# Patient Record
Sex: FEMALE | Race: BLACK OR AFRICAN AMERICAN | NOT HISPANIC OR LATINO
[De-identification: names, ages, dates, MRNs, and addresses within clinical notes are randomized per-mention and may not be internally consistent; named-entity substitution may affect disease eponyms.]

---

## 2017-02-01 ENCOUNTER — FORM ENCOUNTER (OUTPATIENT)
Age: 72
End: 2017-02-01

## 2017-02-02 ENCOUNTER — APPOINTMENT (OUTPATIENT)
Dept: RHEUMATOLOGY | Facility: CLINIC | Age: 72
End: 2017-02-02

## 2017-02-02 ENCOUNTER — LABORATORY RESULT (OUTPATIENT)
Age: 72
End: 2017-02-02

## 2017-02-02 VITALS
OXYGEN SATURATION: 98 % | HEIGHT: 62 IN | DIASTOLIC BLOOD PRESSURE: 83 MMHG | BODY MASS INDEX: 43.24 KG/M2 | SYSTOLIC BLOOD PRESSURE: 125 MMHG | TEMPERATURE: 97.6 F | WEIGHT: 235 LBS | HEART RATE: 80 BPM

## 2017-02-02 DIAGNOSIS — M75.112 INCOMPLETE ROTATOR CUFF TEAR OR RUPTURE OF LEFT SHOULDER, NOT SPECIFIED AS TRAUMATIC: ICD-10-CM

## 2017-02-03 LAB
25(OH)D3 SERPL-MCNC: 25.3 NG/ML
ALBUMIN SERPL ELPH-MCNC: 4.2 G/DL
ALP BLD-CCNC: 110 U/L
ALT SERPL-CCNC: 11 U/L
ANION GAP SERPL CALC-SCNC: 17 MMOL/L
AST SERPL-CCNC: 18 U/L
BASOPHILS # BLD AUTO: 0.04 K/UL
BASOPHILS NFR BLD AUTO: 0.7 %
BILIRUB SERPL-MCNC: 0.2 MG/DL
BUN SERPL-MCNC: 13 MG/DL
CALCIUM SERPL-MCNC: 9 MG/DL
CHLORIDE SERPL-SCNC: 95 MMOL/L
CO2 SERPL-SCNC: 24 MMOL/L
CREAT SERPL-MCNC: 0.82 MG/DL
CRP SERPL-MCNC: 1.02 MG/DL
EOSINOPHIL # BLD AUTO: 0.06 K/UL
EOSINOPHIL NFR BLD AUTO: 1 %
ERYTHROCYTE [SEDIMENTATION RATE] IN BLOOD BY WESTERGREN METHOD: 67 MM/HR
GLUCOSE SERPL-MCNC: 98 MG/DL
HCT VFR BLD CALC: 36.4 %
HGB BLD-MCNC: 11.8 G/DL
IMM GRANULOCYTES NFR BLD AUTO: 0.5 %
LYMPHOCYTES # BLD AUTO: 2 K/UL
LYMPHOCYTES NFR BLD AUTO: 33.8 %
MAN DIFF?: NORMAL
MCHC RBC-ENTMCNC: 22.8 PG
MCHC RBC-ENTMCNC: 32.4 GM/DL
MCV RBC AUTO: 70.4 FL
MONOCYTES # BLD AUTO: 0.64 K/UL
MONOCYTES NFR BLD AUTO: 10.8 %
NEUTROPHILS # BLD AUTO: 3.15 K/UL
NEUTROPHILS NFR BLD AUTO: 53.2 %
PLATELET # BLD AUTO: 368 K/UL
POTASSIUM SERPL-SCNC: 4.1 MMOL/L
PROT SERPL-MCNC: 7.2 G/DL
RBC # BLD: 5.17 M/UL
RBC # FLD: 15.3 %
SODIUM SERPL-SCNC: 136 MMOL/L
WBC # FLD AUTO: 5.92 K/UL

## 2017-03-13 ENCOUNTER — RX RENEWAL (OUTPATIENT)
Age: 72
End: 2017-03-13

## 2017-03-21 ENCOUNTER — APPOINTMENT (OUTPATIENT)
Dept: RHEUMATOLOGY | Facility: CLINIC | Age: 72
End: 2017-03-21

## 2017-03-21 VITALS
OXYGEN SATURATION: 98 % | WEIGHT: 247 LBS | BODY MASS INDEX: 45.45 KG/M2 | HEIGHT: 62 IN | SYSTOLIC BLOOD PRESSURE: 145 MMHG | HEART RATE: 73 BPM | DIASTOLIC BLOOD PRESSURE: 89 MMHG

## 2017-05-01 ENCOUNTER — OTHER (OUTPATIENT)
Age: 72
End: 2017-05-01

## 2017-05-18 ENCOUNTER — APPOINTMENT (OUTPATIENT)
Dept: ORTHOPEDIC SURGERY | Facility: CLINIC | Age: 72
End: 2017-05-18

## 2017-05-18 DIAGNOSIS — Q66.6 OTHER CONGENITAL VALGUS DEFORMITIES OF FEET: ICD-10-CM

## 2017-05-18 DIAGNOSIS — M21.41 FLAT FOOT [PES PLANUS] (ACQUIRED), RIGHT FOOT: ICD-10-CM

## 2017-05-22 ENCOUNTER — APPOINTMENT (OUTPATIENT)
Dept: RHEUMATOLOGY | Facility: CLINIC | Age: 72
End: 2017-05-22

## 2017-05-22 VITALS
HEIGHT: 62 IN | DIASTOLIC BLOOD PRESSURE: 84 MMHG | TEMPERATURE: 97.5 F | BODY MASS INDEX: 46.19 KG/M2 | HEART RATE: 78 BPM | WEIGHT: 251 LBS | SYSTOLIC BLOOD PRESSURE: 138 MMHG | OXYGEN SATURATION: 98 %

## 2017-05-25 ENCOUNTER — APPOINTMENT (OUTPATIENT)
Dept: ORTHOPEDIC SURGERY | Facility: CLINIC | Age: 72
End: 2017-05-25

## 2017-05-25 VITALS
BODY MASS INDEX: 46.19 KG/M2 | WEIGHT: 251 LBS | DIASTOLIC BLOOD PRESSURE: 80 MMHG | SYSTOLIC BLOOD PRESSURE: 135 MMHG | HEART RATE: 75 BPM | HEIGHT: 62 IN

## 2017-05-30 ENCOUNTER — APPOINTMENT (OUTPATIENT)
Dept: CT IMAGING | Facility: CLINIC | Age: 72
End: 2017-05-30

## 2017-06-01 ENCOUNTER — APPOINTMENT (OUTPATIENT)
Dept: ORTHOPEDIC SURGERY | Facility: CLINIC | Age: 72
End: 2017-06-01

## 2017-06-09 ENCOUNTER — OUTPATIENT (OUTPATIENT)
Dept: OUTPATIENT SERVICES | Facility: HOSPITAL | Age: 72
LOS: 1 days | End: 2017-06-09
Payer: MEDICARE

## 2017-06-09 ENCOUNTER — APPOINTMENT (OUTPATIENT)
Dept: RADIOLOGY | Facility: IMAGING CENTER | Age: 72
End: 2017-06-09

## 2017-06-09 DIAGNOSIS — Z98.89 OTHER SPECIFIED POSTPROCEDURAL STATES: Chronic | ICD-10-CM

## 2017-06-09 DIAGNOSIS — Z00.8 ENCOUNTER FOR OTHER GENERAL EXAMINATION: ICD-10-CM

## 2017-06-09 DIAGNOSIS — I83.93 ASYMPTOMATIC VARICOSE VEINS OF BILATERAL LOWER EXTREMITIES: Chronic | ICD-10-CM

## 2017-06-09 PROCEDURE — 72110 X-RAY EXAM L-2 SPINE 4/>VWS: CPT

## 2017-06-20 ENCOUNTER — APPOINTMENT (OUTPATIENT)
Dept: ORTHOPEDIC SURGERY | Facility: CLINIC | Age: 72
End: 2017-06-20

## 2017-06-20 VITALS
WEIGHT: 250 LBS | HEIGHT: 62 IN | DIASTOLIC BLOOD PRESSURE: 76 MMHG | BODY MASS INDEX: 46.01 KG/M2 | SYSTOLIC BLOOD PRESSURE: 110 MMHG | HEART RATE: 72 BPM

## 2017-06-20 RX ORDER — WARFARIN 2.5 MG/1
2.5 TABLET ORAL
Qty: 200 | Refills: 0 | Status: DISCONTINUED | COMMUNITY
Start: 2016-11-02

## 2017-07-03 ENCOUNTER — APPOINTMENT (OUTPATIENT)
Dept: CT IMAGING | Facility: CLINIC | Age: 72
End: 2017-07-03

## 2017-07-04 ENCOUNTER — INPATIENT (INPATIENT)
Facility: HOSPITAL | Age: 72
LOS: 0 days | Discharge: HOME | End: 2017-07-05
Attending: HOSPITALIST | Admitting: HOSPITALIST
Payer: MEDICARE

## 2017-07-04 VITALS
HEART RATE: 78 BPM | SYSTOLIC BLOOD PRESSURE: 125 MMHG | OXYGEN SATURATION: 98 % | TEMPERATURE: 99 F | RESPIRATION RATE: 18 BRPM | DIASTOLIC BLOOD PRESSURE: 66 MMHG

## 2017-07-04 DIAGNOSIS — M71.20 SYNOVIAL CYST OF POPLITEAL SPACE [BAKER], UNSPECIFIED KNEE: ICD-10-CM

## 2017-07-04 DIAGNOSIS — M79.606 PAIN IN LEG, UNSPECIFIED: ICD-10-CM

## 2017-07-04 DIAGNOSIS — Z29.9 ENCOUNTER FOR PROPHYLACTIC MEASURES, UNSPECIFIED: ICD-10-CM

## 2017-07-04 DIAGNOSIS — I10 ESSENTIAL (PRIMARY) HYPERTENSION: ICD-10-CM

## 2017-07-04 DIAGNOSIS — Z98.89 OTHER SPECIFIED POSTPROCEDURAL STATES: Chronic | ICD-10-CM

## 2017-07-04 DIAGNOSIS — M35.3 POLYMYALGIA RHEUMATICA: ICD-10-CM

## 2017-07-04 DIAGNOSIS — I82.509 CHRONIC EMBOLISM AND THROMBOSIS OF UNSPECIFIED DEEP VEINS OF UNSPECIFIED LOWER EXTREMITY: ICD-10-CM

## 2017-07-04 DIAGNOSIS — I83.93 ASYMPTOMATIC VARICOSE VEINS OF BILATERAL LOWER EXTREMITIES: Chronic | ICD-10-CM

## 2017-07-04 DIAGNOSIS — M79.604 PAIN IN RIGHT LEG: ICD-10-CM

## 2017-07-04 DIAGNOSIS — J45.20 MILD INTERMITTENT ASTHMA, UNCOMPLICATED: ICD-10-CM

## 2017-07-04 LAB
ALBUMIN SERPL ELPH-MCNC: 4 G/DL — SIGNIFICANT CHANGE UP (ref 3.3–5)
ALP SERPL-CCNC: 86 U/L — SIGNIFICANT CHANGE UP (ref 40–120)
ALT FLD-CCNC: 12 U/L — SIGNIFICANT CHANGE UP (ref 4–33)
ANISOCYTOSIS BLD QL: SLIGHT — SIGNIFICANT CHANGE UP
APTT BLD: 42 SEC — HIGH (ref 27.5–37.4)
AST SERPL-CCNC: 19 U/L — SIGNIFICANT CHANGE UP (ref 4–32)
BASOPHILS # BLD AUTO: 0.05 K/UL — SIGNIFICANT CHANGE UP (ref 0–0.2)
BASOPHILS NFR BLD AUTO: 0.9 % — SIGNIFICANT CHANGE UP (ref 0–2)
BASOPHILS NFR SPEC: 0 % — SIGNIFICANT CHANGE UP (ref 0–2)
BILIRUB SERPL-MCNC: 0.3 MG/DL — SIGNIFICANT CHANGE UP (ref 0.2–1.2)
BUN SERPL-MCNC: 12 MG/DL — SIGNIFICANT CHANGE UP (ref 7–23)
CALCIUM SERPL-MCNC: 9 MG/DL — SIGNIFICANT CHANGE UP (ref 8.4–10.5)
CHLORIDE SERPL-SCNC: 100 MMOL/L — SIGNIFICANT CHANGE UP (ref 98–107)
CO2 SERPL-SCNC: 29 MMOL/L — SIGNIFICANT CHANGE UP (ref 22–31)
CREAT SERPL-MCNC: 0.72 MG/DL — SIGNIFICANT CHANGE UP (ref 0.5–1.3)
EOSINOPHIL # BLD AUTO: 0.12 K/UL — SIGNIFICANT CHANGE UP (ref 0–0.5)
EOSINOPHIL NFR BLD AUTO: 2.2 % — SIGNIFICANT CHANGE UP (ref 0–6)
EOSINOPHIL NFR FLD: 5 % — SIGNIFICANT CHANGE UP (ref 0–6)
GLUCOSE SERPL-MCNC: 81 MG/DL — SIGNIFICANT CHANGE UP (ref 70–99)
HCT VFR BLD CALC: 34 % — LOW (ref 34.5–45)
HGB BLD-MCNC: 10.7 G/DL — LOW (ref 11.5–15.5)
HYPOCHROMIA BLD QL: SLIGHT — SIGNIFICANT CHANGE UP
IMM GRANULOCYTES # BLD AUTO: 0.01 # — SIGNIFICANT CHANGE UP
IMM GRANULOCYTES NFR BLD AUTO: 0.2 % — SIGNIFICANT CHANGE UP (ref 0–1.5)
INR BLD: 2.2 — HIGH (ref 0.88–1.17)
LYMPHOCYTES # BLD AUTO: 1.71 K/UL — SIGNIFICANT CHANGE UP (ref 1–3.3)
LYMPHOCYTES # BLD AUTO: 30.7 % — SIGNIFICANT CHANGE UP (ref 13–44)
LYMPHOCYTES NFR SPEC AUTO: 18 % — SIGNIFICANT CHANGE UP (ref 13–44)
MANUAL SMEAR VERIFICATION: YES — SIGNIFICANT CHANGE UP
MCHC RBC-ENTMCNC: 21.7 PG — LOW (ref 27–34)
MCHC RBC-ENTMCNC: 31.5 % — LOW (ref 32–36)
MCV RBC AUTO: 69 FL — LOW (ref 80–100)
MICROCYTES BLD QL: SIGNIFICANT CHANGE UP
MONOCYTES # BLD AUTO: 0.76 K/UL — SIGNIFICANT CHANGE UP (ref 0–0.9)
MONOCYTES NFR BLD AUTO: 13.6 % — SIGNIFICANT CHANGE UP (ref 2–14)
MONOCYTES NFR BLD: 16 % — HIGH (ref 2–9)
NEUTROPHIL AB SER-ACNC: 61 % — SIGNIFICANT CHANGE UP (ref 43–77)
NEUTROPHILS # BLD AUTO: 2.92 K/UL — SIGNIFICANT CHANGE UP (ref 1.8–7.4)
NEUTROPHILS NFR BLD AUTO: 52.4 % — SIGNIFICANT CHANGE UP (ref 43–77)
NRBC # FLD: 0 — SIGNIFICANT CHANGE UP
OVALOCYTES BLD QL SMEAR: SLIGHT — SIGNIFICANT CHANGE UP
PLATELET # BLD AUTO: 298 K/UL — SIGNIFICANT CHANGE UP (ref 150–400)
PLATELET COUNT - ESTIMATE: NORMAL — SIGNIFICANT CHANGE UP
PMV BLD: 9.1 FL — SIGNIFICANT CHANGE UP (ref 7–13)
POTASSIUM SERPL-MCNC: 3.8 MMOL/L — SIGNIFICANT CHANGE UP (ref 3.5–5.3)
POTASSIUM SERPL-SCNC: 3.8 MMOL/L — SIGNIFICANT CHANGE UP (ref 3.5–5.3)
PROT SERPL-MCNC: 7 G/DL — SIGNIFICANT CHANGE UP (ref 6–8.3)
PROTHROM AB SERPL-ACNC: 25 SEC — HIGH (ref 9.8–13.1)
RBC # BLD: 4.93 M/UL — SIGNIFICANT CHANGE UP (ref 3.8–5.2)
RBC # FLD: 16.7 % — HIGH (ref 10.3–14.5)
REVIEW TO FOLLOW: YES — SIGNIFICANT CHANGE UP
SODIUM SERPL-SCNC: 140 MMOL/L — SIGNIFICANT CHANGE UP (ref 135–145)
WBC # BLD: 5.57 K/UL — SIGNIFICANT CHANGE UP (ref 3.8–10.5)
WBC # FLD AUTO: 5.57 K/UL — SIGNIFICANT CHANGE UP (ref 3.8–10.5)

## 2017-07-04 PROCEDURE — 93970 EXTREMITY STUDY: CPT | Mod: 26

## 2017-07-04 PROCEDURE — 73521 X-RAY EXAM HIPS BI 2 VIEWS: CPT | Mod: 26

## 2017-07-04 PROCEDURE — 73562 X-RAY EXAM OF KNEE 3: CPT | Mod: 26,50

## 2017-07-04 RX ORDER — DOCUSATE SODIUM 100 MG
100 CAPSULE ORAL
Qty: 0 | Refills: 0 | Status: DISCONTINUED | OUTPATIENT
Start: 2017-07-04 | End: 2017-07-05

## 2017-07-04 RX ORDER — AMLODIPINE BESYLATE 2.5 MG/1
5 TABLET ORAL DAILY
Qty: 0 | Refills: 0 | Status: DISCONTINUED | OUTPATIENT
Start: 2017-07-05 | End: 2017-07-05

## 2017-07-04 RX ORDER — LOSARTAN POTASSIUM 100 MG/1
100 TABLET, FILM COATED ORAL DAILY
Qty: 0 | Refills: 0 | Status: DISCONTINUED | OUTPATIENT
Start: 2017-07-05 | End: 2017-07-05

## 2017-07-04 RX ORDER — HYDROCHLOROTHIAZIDE 25 MG
25 TABLET ORAL DAILY
Qty: 0 | Refills: 0 | Status: DISCONTINUED | OUTPATIENT
Start: 2017-07-05 | End: 2017-07-05

## 2017-07-04 RX ORDER — ACETAMINOPHEN 500 MG
650 TABLET ORAL EVERY 6 HOURS
Qty: 0 | Refills: 0 | Status: DISCONTINUED | OUTPATIENT
Start: 2017-07-04 | End: 2017-07-05

## 2017-07-04 RX ORDER — WARFARIN SODIUM 2.5 MG/1
5 TABLET ORAL ONCE
Qty: 0 | Refills: 0 | Status: DISCONTINUED | OUTPATIENT
Start: 2017-07-05 | End: 2017-07-05

## 2017-07-04 RX ORDER — BUDESONIDE AND FORMOTEROL FUMARATE DIHYDRATE 160; 4.5 UG/1; UG/1
2 AEROSOL RESPIRATORY (INHALATION)
Qty: 0 | Refills: 0 | Status: DISCONTINUED | OUTPATIENT
Start: 2017-07-04 | End: 2017-07-05

## 2017-07-04 RX ADMIN — Medication 75 MILLIGRAM(S): at 22:41

## 2017-07-04 RX ADMIN — Medication 50 MILLIGRAM(S): at 16:44

## 2017-07-04 RX ADMIN — BUDESONIDE AND FORMOTEROL FUMARATE DIHYDRATE 2 PUFF(S): 160; 4.5 AEROSOL RESPIRATORY (INHALATION) at 23:19

## 2017-07-04 NOTE — H&P ADULT - PROBLEM SELECTOR PLAN 1
- Patient c/o of bilateral knee pain on standing, located in the back of the knee  - US of the leg showed small b/l baker's cysts   - Pain control with percocet   - May benefit from steroid injection. Consult Ortho   - PT evaluation - Patient c/o of bilateral knee pain on standing, located in the back of the knee  - US of the leg showed small b/l baker's cysts   - Pain control with percocet (bowel regimen to prevent s/e of constipation)  - May benefit from steroid injection. Consult Ortho   - PT evaluation

## 2017-07-04 NOTE — ED PROVIDER NOTE - ATTENDING CONTRIBUTION TO CARE
agree with resident note  72yo female h/o htn, PE on coumadin (protein c/s) PMR, arthritis presents with b/l leg swelling and inability to walk.  Pt is on lyrica and told not to start narcotics per PMD.  Has not been on steroids.  Has long hx of pain issues and swelling with arms and legs but today states much worse because can not walk and having hard time lifting leg.    PE: well appearing; VSS; CTAB/L; s1 s2 no m/r/g abd: soft/NT/ND ext: no edema

## 2017-07-04 NOTE — H&P ADULT - PROBLEM SELECTOR PLAN 4
- c/w home Losartan-HCTZ 100-25 and amlodipine 5mg once daily - No acute issues presently  - c/w home Losartan-HCTZ 100-25 and amlodipine 5mg once daily

## 2017-07-04 NOTE — H&P ADULT - NSHPREVIEWOFSYSTEMS_GEN_ALL_CORE
REVIEW OF SYSTEMS:  CONSTITUTIONAL: No fever, weight loss, or fatigue  EYES: No eye pain, visual disturbances, or discharge  ENMT:  No difficulty hearing, tinnitus, vertigo; No sinus or throat pain  RESPIRATORY: No cough, wheezing, chills or hemoptysis; No shortness of breath  CARDIOVASCULAR: No chest pain, palpitations, dizziness, or leg swelling  GASTROINTESTINAL: No abdominal or epigastric pain. No nausea, vomiting; No diarrhea or constipation.  GENITOURINARY: No dysuria, frequency, or incontinence  NEUROLOGICAL: No headaches, memory loss, loss of strength, numbness, or tremors  SKIN: No itching, burning, rashes, or lesions   MUSCULOSKELETAL: + for bilateral knee pain and lower ext. swelling   PSYCHIATRIC: No depression, anxiety, mood swings, or difficulty sleeping  HEME/LYMPH: No easy bruising, or bleeding gums REVIEW OF SYSTEMS:  CONSTITUTIONAL: No fever, weight loss, or fatigue  EYES: No eye pain, visual disturbances, or discharge  ENMT:  No difficulty hearing, tinnitus, vertigo; No sinus or throat pain  RESPIRATORY: No cough, wheezing, chills; No shortness of breath  CARDIOVASCULAR: No chest pain, palpitations, dizziness, or leg swelling  GASTROINTESTINAL: No abdominal or epigastric pain. No nausea, vomiting; No diarrhea or constipation.  GENITOURINARY: No dysuria, frequency, or incontinence  NEUROLOGICAL: No headaches, memory loss, loss of strength, numbness, or tremors  SKIN: No itching, burning, rashes, or lesions   MUSCULOSKELETAL: + for bilateral knee pain  PSYCHIATRIC: No depression, anxiety, mood swings, or difficulty sleeping  HEME/LYMPH: No easy bruising, or bleeding gums

## 2017-07-04 NOTE — ED PROVIDER NOTE - OBJECTIVE STATEMENT
72yo female h/o htn, PE on coumadin (protein c/s) PMR, arthritis, p/w inability to walk/bear weight on legs 2/2 to knee pain. Legs chronically swollen, too heavy to lift. No weakness, no sensory deficit, no fever, chills. Pain is worse on left leg little in popliteal area

## 2017-07-04 NOTE — PATIENT PROFILE ADULT. - NS TRANSFER PATIENT BELONGINGS
1 pair blue jemoris, blue and white stripe top, 1 pair grey sneakres,/Clothing Clothing/1 HTC phone, 1 pair blue jeans, blue and white stripe top, 1 pair grey willa,

## 2017-07-04 NOTE — ED ADULT TRIAGE NOTE - CHIEF COMPLAINT QUOTE
p/t c/o of pain to ble and weakness since this am and difficulty walking denies any other discomfort nad noted

## 2017-07-04 NOTE — H&P ADULT - HISTORY OF PRESENT ILLNESS
71 y.o. female with PMHx of HTN, PE/DVTs on coumadin, polymyalgia rheumatica, bilateral hip replacement, presenting with inability to walk or bear weight on the legs due to pain in the knees. The patient states her symptoms started around 11am this morning when she tried to get out of bed and has severe pain, /10 in severity in the bilateral knees. 71 y.o. female with PMHx of HTN, PE/DVTs on coumadin and IVC filter, polymyalgia rheumatica, bilateral hip replacement, presenting with inability to walk or bear weight on the legs due to pain in the knees. The patient states her symptoms started around 11am this morning when she tried to get out of bed and has severe pain, 10/10 in severity in the bilateral knees. At baseline the patient is able to ambulate on her own with the assistance of a rolling walker, does use a wheelchair on other instances, but could not today. The pain was located in the back of the knee and had mild numbness in the toes and was acutely worse on standing, and was relived when lying down. The patient denies any urinary/bowel incontinence, saddle anesthesia or loss of sensations in the lower extremities Also denies any new swelling, tenderness or erythema of the legs. Patient denies any sick contacts or recent travel as well. Due to limited mobility and pain the patient came into the ED     In The ED:    Vital Signs Last 24 Hrs  T(C): 36 (04 Jul 2017 20:23), Max: 37.2 (04 Jul 2017 14:07)  T(F): 96.8 (04 Jul 2017 20:23), Max: 99 (04 Jul 2017 14:07)  HR: 68 (04 Jul 2017 20:23) (68 - 78)  BP: 146/89 (04 Jul 2017 20:23) (125/66 - 148/78)  BP(mean): --  RR: 17 (04 Jul 2017 20:23) (17 - 18)  SpO2: 100% (04 Jul 2017 20:23) (98% - 100%)    The patient underwent a Duplex US of the lower exts. which was negative for DVTs but showed small bilateral baker's cysts.

## 2017-07-04 NOTE — H&P ADULT - PMH
Asthma    Deep venous thrombosis  2005, 2014 x 2  HTN (hypertension)    Liver nodule    Pancreatic abscess    PE (pulmonary embolism)  s/p [place] Filter  Polymyalgia rheumatica

## 2017-07-04 NOTE — H&P ADULT - PSH
[place] filter in place    History of appendectomy    S/P hip replacement  b/l hip replacement  S/P hysterectomy    Varicose vein of leg  cauterization left leg vein 1989

## 2017-07-04 NOTE — H&P ADULT - NSHPSOCIALHISTORY_GEN_ALL_CORE
Social History:    Marital Status:  (   )    (   ) Single    (   )    (  )   Occupation:   Lives with: (  ) alone  (  ) children   (  ) spouse   (  ) parents  (  ) other    Substance Use (street drugs): (  ) never used  (  ) other:  Tobacco Usage:  (   ) never smoked   (   ) former smoker   (   ) current smoker  (     ) pack year  (        ) last cigarette date  Alcohol Usage:  Sexual History: Social History:    Marital Status:  (   )    (   ) Single    (   )    ( x )   Occupation: Retired  Lives with: ( x ) alone  (  ) children   (  ) spouse   (  ) parents  (  ) other    Substance Use (street drugs): (x  ) never used  (  ) other:  Tobacco Usage:  (  x ) never smoked   (   ) former smoker   (   ) current smoker  (     ) pack year  (        ) last cigarette date  Alcohol Usage: glass of wine in a week

## 2017-07-04 NOTE — ED ADULT NURSE NOTE - OBJECTIVE STATEMENT
Hx of intermittent weakness, on coumadin for prior PE. Pt AOx3 c/o b/l pain, mild numbness to right foot, inability to lift left leg and posterior pain to left knee. Pt states she normally takes Lyrica for pain and is able to walk, but was unable today. Equal sensation bilaterally. Pt denies fever, chills, n/v, chest pain, SOB. Family at bedside.

## 2017-07-04 NOTE — ED PROVIDER NOTE - PMH
Asthma    Deep venous thrombosis  2005, 2014 x 2  HTN (hypertension)    Liver nodule    Pancreatic abscess    PE (pulmonary embolism)  s/p [place] Filter

## 2017-07-04 NOTE — H&P ADULT - FAMILY HISTORY
Father  Still living? Unknown  Family history of MI (myocardial infarction), Age at diagnosis: Age Unknown

## 2017-07-04 NOTE — H&P ADULT - PROBLEM SELECTOR PLAN 2
- Percocet 5mg/325mg q6h PRN for severe pain   - Tylenol for mild and moderate pain   - c/w Lyrica for PMR pain  - Colace BID

## 2017-07-04 NOTE — H&P ADULT - NSHPLABSRESULTS_GEN_ALL_CORE
Labs:    07-04    140  |  100  |  12  ----------------------------<  81  3.8   |  29  |  0.72    Ca    9.0      04 Jul 2017 16:35    TPro  7.0  /  Alb  4.0  /  TBili  0.3  /  DBili  x   /  AST  19  /  ALT  12  /  AlkPhos  86  07-04      PT/INR - ( 04 Jul 2017 16:35 )   PT: 25.0 SEC;   INR: 2.20          PTT - ( 04 Jul 2017 16:35 )  PTT:42.0 SEC                                        10.7   5.57  )-----------( 298      ( 04 Jul 2017 16:35 )             34.0       Radiology:    US Duplex Venous Lower Ext Complete, Bilateral (07.04.17 @ 17:50)     IMPRESSION:     No evidence of bilateral lower extremity deep venous thrombosis. The   bilateral calf veins were not visualized.  Bilateral small Baker cysts.

## 2017-07-04 NOTE — ED PROVIDER NOTE - PSH
[place] filter in place    History of appendectomy    S/P hip replacement  b/l hip replacement  S/P hysterectomy    Varicose vein of leg  cauterization left leg vein 1989

## 2017-07-04 NOTE — H&P ADULT - NSHPPHYSICALEXAM_GEN_ALL_CORE
Vital Signs Last 24 Hrs  T(C): 36.1 (04 Jul 2017 18:02), Max: 37.2 (04 Jul 2017 14:07)  T(F): 96.9 (04 Jul 2017 18:02), Max: 99 (04 Jul 2017 14:07)  HR: 76 (04 Jul 2017 18:02) (76 - 78)  BP: 148/78 (04 Jul 2017 18:02) (125/66 - 148/78)  BP(mean): --  RR: 17 (04 Jul 2017 18:02) (17 - 18)  SpO2: 99% (04 Jul 2017 18:02) (98% - 99%)    PHYSICAL EXAM:  GENERAL: NAD, well-groomed, well-developed  HEAD:  Atraumatic, Normocephalic  EYES: EOMI, PERRLA, conjunctiva and sclera clear  ENMT: No tonsillar erythema, exudates, or enlargement; Moist mucous membranes, Good dentition, No lesions  NECK: Supple, No JVD, Normal thyroid  CHEST/LUNG: Clear to percussion bilaterally; No rales, rhonchi, wheezing, or rubs  HEART: Regular rate and rhythm; No murmurs, rubs, or gallops  ABDOMEN: Soft, Nontender, Nondistended; Bowel sounds present  EXTREMITIES:  2+ Peripheral Pulses, No clubbing, cyanosis, or edema  LYMPH: No lymphadenopathy noted  SKIN: No rashes or lesions  NERVOUS SYSTEM:  Alert & Oriented X3, Good concentration; Motor Strength 5/5 B/L upper and lower extremities; DTRs 2+ intact and symmetric Vital Signs Last 24 Hrs  T(C): 36.1 (04 Jul 2017 18:02), Max: 37.2 (04 Jul 2017 14:07)  T(F): 96.9 (04 Jul 2017 18:02), Max: 99 (04 Jul 2017 14:07)  HR: 76 (04 Jul 2017 18:02) (76 - 78)  BP: 148/78 (04 Jul 2017 18:02) (125/66 - 148/78)  BP(mean): --  RR: 17 (04 Jul 2017 18:02) (17 - 18)  SpO2: 99% (04 Jul 2017 18:02) (98% - 99%)    PHYSICAL EXAM:  GENERAL: NAD, well-groomed, well-developed  HEAD:  Atraumatic, Normocephalic  EYES: EOMI, PERRLA, conjunctiva and sclera clear  ENMT: No tonsillar erythema, exudates, or enlargement; Moist mucous membranes, Good dentition, No lesions  NECK: Supple, No JVD  CHEST/LUNG: Clear to percussion bilaterally; No rales, rhonchi, wheezing, or rubs  HEART: Regular rate and rhythm; No murmurs, rubs, or gallops  ABDOMEN: Soft, Nontender, Nondistended; Bowel sounds present  EXTREMITIES:  2+ Peripheral Pulses. Tenderness along the popiteal fossa   LYMPH: No lymphadenopathy noted  SKIN: No rashes or lesions  NERVOUS SYSTEM:  Alert & Oriented X3, Good concentration; Motor Strength 5/5 in Upper Ext. 3/5 in the right lower ext, 3/5 in the left lower extremity

## 2017-07-04 NOTE — H&P ADULT - PROBLEM SELECTOR PLAN 5
- Patient was diagnosis last year  - Started on Prednisone but had severe swelling. Stopped after three months   - Lyrica for pain associated with PMR - Patient was diagnosed last year  - Started on Prednisone but had severe swelling; stopped after three months   - Lyrica for pain associated with PMR  - Physical therapy

## 2017-07-04 NOTE — H&P ADULT - PROBLEM SELECTOR PLAN 6
- Pt is on Advair diskus at home   - Symbicort in hospital   - Albuterol PRN - Asymptomatic presently  - Pt is on Advair diskus at home   - Symbicort in hospital   - Albuterol PRN

## 2017-07-04 NOTE — H&P ADULT - ASSESSMENT
71 y.o. female with PMHx of HTN, PE/DVTs on coumadin, polymyalgia rheumatica, bilateral hip replacement admitted for inability to walk due to bilateral pain most likely due to Baker's cyst. 71 y.o. female with PMHx of HTN, PE/DVTs on coumadin and, polymyalgia rheumatica, bilateral hip replacement admitted for inability to walk due to bilateral knee pain most likely due to Baker's cyst.

## 2017-07-04 NOTE — H&P ADULT - PROBLEM SELECTOR PROBLEM 3
Chronic deep vein thrombosis (DVT) of lower extremity, unspecified laterality, unspecified vein Chronic deep vein thrombosis (DVT) of tibial vein of left lower extremity

## 2017-07-04 NOTE — ED ADULT NURSE NOTE - PMH
Asthma    Deep venous thrombosis  2005, 2014 x 2  HTN (hypertension)    Liver nodule    Pancreatic abscess    PE (pulmonary embolism)  s/p [place] Filter

## 2017-07-04 NOTE — ED PROVIDER NOTE - MEDICAL DECISION MAKING DETAILS
70yo female with b/l leg pain unable to ambulate, pain worse in left leg, pain control, r/o DVT, reassess, if cant ambulate will need admission

## 2017-07-05 ENCOUNTER — TRANSCRIPTION ENCOUNTER (OUTPATIENT)
Age: 72
End: 2017-07-05

## 2017-07-05 VITALS
DIASTOLIC BLOOD PRESSURE: 74 MMHG | HEART RATE: 72 BPM | SYSTOLIC BLOOD PRESSURE: 138 MMHG | OXYGEN SATURATION: 99 % | TEMPERATURE: 98 F | RESPIRATION RATE: 18 BRPM

## 2017-07-05 DIAGNOSIS — I82.542 CHRONIC EMBOLISM AND THROMBOSIS OF LEFT TIBIAL VEIN: ICD-10-CM

## 2017-07-05 LAB
24R-OH-CALCIDIOL SERPL-MCNC: 26.3 NG/ML — LOW (ref 30–100)
BUN SERPL-MCNC: 16 MG/DL — SIGNIFICANT CHANGE UP (ref 7–23)
CALCIUM SERPL-MCNC: 9.1 MG/DL — SIGNIFICANT CHANGE UP (ref 8.4–10.5)
CHLORIDE SERPL-SCNC: 98 MMOL/L — SIGNIFICANT CHANGE UP (ref 98–107)
CO2 SERPL-SCNC: 25 MMOL/L — SIGNIFICANT CHANGE UP (ref 22–31)
CREAT SERPL-MCNC: 0.69 MG/DL — SIGNIFICANT CHANGE UP (ref 0.5–1.3)
GLUCOSE SERPL-MCNC: 132 MG/DL — HIGH (ref 70–99)
HCT VFR BLD CALC: 35.2 % — SIGNIFICANT CHANGE UP (ref 34.5–45)
HGB BLD-MCNC: 11.4 G/DL — LOW (ref 11.5–15.5)
INR BLD: 2.33 — HIGH (ref 0.88–1.17)
MAGNESIUM SERPL-MCNC: 2.3 MG/DL — SIGNIFICANT CHANGE UP (ref 1.6–2.6)
MCHC RBC-ENTMCNC: 22.7 PG — LOW (ref 27–34)
MCHC RBC-ENTMCNC: 32.4 % — SIGNIFICANT CHANGE UP (ref 32–36)
MCV RBC AUTO: 70.1 FL — LOW (ref 80–100)
NRBC # FLD: 0 — SIGNIFICANT CHANGE UP
PHOSPHATE SERPL-MCNC: 3.2 MG/DL — SIGNIFICANT CHANGE UP (ref 2.5–4.5)
PLATELET # BLD AUTO: 313 K/UL — SIGNIFICANT CHANGE UP (ref 150–400)
PMV BLD: 10.2 FL — SIGNIFICANT CHANGE UP (ref 7–13)
POTASSIUM SERPL-MCNC: 3.8 MMOL/L — SIGNIFICANT CHANGE UP (ref 3.5–5.3)
POTASSIUM SERPL-SCNC: 3.8 MMOL/L — SIGNIFICANT CHANGE UP (ref 3.5–5.3)
PROTHROM AB SERPL-ACNC: 26.5 SEC — HIGH (ref 9.8–13.1)
RBC # BLD: 5.02 M/UL — SIGNIFICANT CHANGE UP (ref 3.8–5.2)
RBC # FLD: 16.6 % — HIGH (ref 10.3–14.5)
SODIUM SERPL-SCNC: 137 MMOL/L — SIGNIFICANT CHANGE UP (ref 135–145)
TSH SERPL-MCNC: 0.58 UIU/ML — SIGNIFICANT CHANGE UP (ref 0.27–4.2)
WBC # BLD: 5.98 K/UL — SIGNIFICANT CHANGE UP (ref 3.8–10.5)
WBC # FLD AUTO: 5.98 K/UL — SIGNIFICANT CHANGE UP (ref 3.8–10.5)

## 2017-07-05 PROCEDURE — 99223 1ST HOSP IP/OBS HIGH 75: CPT | Mod: GC

## 2017-07-05 RX ORDER — OXYCODONE HYDROCHLORIDE 5 MG/1
1 TABLET ORAL
Qty: 12 | Refills: 0 | OUTPATIENT
Start: 2017-07-05 | End: 2017-07-08

## 2017-07-05 RX ORDER — WARFARIN SODIUM 2.5 MG/1
5 TABLET ORAL ONCE
Qty: 0 | Refills: 0 | Status: COMPLETED | OUTPATIENT
Start: 2017-07-05 | End: 2017-07-05

## 2017-07-05 RX ORDER — HYDROCHLOROTHIAZIDE 25 MG
25 TABLET ORAL DAILY
Qty: 0 | Refills: 0 | Status: DISCONTINUED | OUTPATIENT
Start: 2017-07-05 | End: 2017-07-05

## 2017-07-05 RX ORDER — ACETAMINOPHEN 500 MG
2 TABLET ORAL
Qty: 0 | Refills: 0 | COMMUNITY
Start: 2017-07-05

## 2017-07-05 RX ORDER — LOSARTAN POTASSIUM 100 MG/1
100 TABLET, FILM COATED ORAL DAILY
Qty: 0 | Refills: 0 | Status: DISCONTINUED | OUTPATIENT
Start: 2017-07-05 | End: 2017-07-05

## 2017-07-05 RX ORDER — LOSARTAN POTASSIUM 100 MG/1
1 TABLET, FILM COATED ORAL
Qty: 0 | Refills: 0 | COMMUNITY
Start: 2017-07-05

## 2017-07-05 RX ADMIN — WARFARIN SODIUM 5 MILLIGRAM(S): 2.5 TABLET ORAL at 13:06

## 2017-07-05 RX ADMIN — Medication 75 MILLIGRAM(S): at 07:00

## 2017-07-05 RX ADMIN — Medication 25 MILLIGRAM(S): at 07:00

## 2017-07-05 RX ADMIN — Medication 100 MILLIGRAM(S): at 07:00

## 2017-07-05 RX ADMIN — LOSARTAN POTASSIUM 100 MILLIGRAM(S): 100 TABLET, FILM COATED ORAL at 07:00

## 2017-07-05 RX ADMIN — AMLODIPINE BESYLATE 5 MILLIGRAM(S): 2.5 TABLET ORAL at 07:00

## 2017-07-05 NOTE — DISCHARGE NOTE ADULT - SECONDARY DIAGNOSIS.
Chronic deep vein thrombosis (DVT) of lower extremity, unspecified laterality, unspecified vein HTN (hypertension)

## 2017-07-05 NOTE — PHYSICAL THERAPY INITIAL EVALUATION ADULT - PERTINENT HX OF CURRENT PROBLEM, REHAB EVAL
This is a 70 y/o F admitted for inability to walk due to bilateral knee pain most likely due to Baker's cyst,US of the leg showed small b/l baker's cysts This is a 72 y/o F admitted for inability to walk due to bilateral knee pain most likely due to Baker's cyst; US of the leg showed small b/l baker's cysts

## 2017-07-05 NOTE — DISCHARGE NOTE ADULT - PLAN OF CARE
Control pain and follow-up with orthopedist Continue to take acetaminophen for mild to moderate pain as indicated and percocet for moderate to severe pain. Follow-up with Dr. Dick for knee pain [phone] Continue taking your coumadin. Blood pressure control Continue taking your amlodipine and losartan-hydrochlorothiazide as indicated and follow-up with your primary care doctor. Continue to take acetaminophen for mild to moderate pain as indicated and percocet for moderate to severe pain. Follow-up with orthopedic surgeons Dr. Dick for knee pain and Dr. Winston or Dr. Rodriguez for ankle pain. Appointments can be made by calling [phone].

## 2017-07-05 NOTE — PROGRESS NOTE ADULT - ASSESSMENT
71 y.o. female with PMHx of HTN, PE/DVTs on coumadin and, polymyalgia rheumatica, bilateral hip replacement admitted for inability to walk due to bilateral knee pain most likely due to Baker's cyst.

## 2017-07-05 NOTE — PROGRESS NOTE ADULT - PROBLEM SELECTOR PLAN 6
- Asymptomatic presently  - Pt is on Advair diskus at home   - Symbicort in hospital   - Albuterol PRN

## 2017-07-05 NOTE — DISCHARGE NOTE ADULT - CARE PROVIDERS DIRECT ADDRESSES
[email or web address], [email or web address], [email or web address], [email or web address]

## 2017-07-05 NOTE — DISCHARGE NOTE ADULT - HOSPITAL COURSE
History of Present Illness: 	  71 y.o. female with PMHx of HTN, PE/DVTs on coumadin and IVC filter, polymyalgia rheumatica, bilateral hip replacement, presenting with inability to walk or bear weight on the legs due to pain in the knees. The patient states her symptoms started around 11am this morning when she tried to get out of bed and has severe pain, 10/10 in severity in the bilateral knees. At baseline the patient is able to ambulate on her own with the assistance of a rolling walker, does use a wheelchair on other instances, but could not today. The pain was located in the back of the knee and had mild numbness in the toes and was acutely worse on standing, and was relived when lying down. The patient denies any urinary/bowel incontinence, saddle anesthesia or loss of sensations in the lower extremities Also denies any new swelling, tenderness or erythema of the legs. Patient denies any sick contacts or recent travel as well. Due to limited mobility and pain the patient came into the ED     In The ED:  T 96.8, HR 68, /89, RR 17    Hospital Course:  On admission, the patient underwent a Duplex US of the lower extremities, which was negative for DVTs but showed small bilateral baker's cysts. Patient was started on percocet 1 tab q6h. Ortho was consulted and stated that there was nothing to do for the patient inpatient but that she could be seen in their clinic for her ankle and knee pain 2/2 osteoarthritis. Pt's vitals were stable during admission and pain was well controlled on medication. PT evaluation during hospitalization recommended in home PT on d/c, however, pt refused home PT or PT of any kind and felt comfortable leaving without physical therapy follow-up.

## 2017-07-05 NOTE — PROGRESS NOTE ADULT - SUBJECTIVE AND OBJECTIVE BOX
Patient is a 71y old  Female who presents with a chief complaint of B/L weakness and numbness and pain (04 Jul 2017 22:08)     INTERVAL HPI/OVERNIGHT EVENTS:  No acute events overnight.    MEDICATIONS  (STANDING):  pregabalin 75 milliGRAM(s) Oral two times a day  amLODIPine   Tablet 5 milliGRAM(s) Oral daily  buDESOnide  80 MICROgram(s)/formoterol 4.5 MICROgram(s) Inhaler 2 Puff(s) Inhalation two times a day  docusate sodium 100 milliGRAM(s) Oral two times a day  warfarin 5 milliGRAM(s) Oral once  hydrochlorothiazide 25 milliGRAM(s) Oral daily  losartan 100 milliGRAM(s) Oral daily    MEDICATIONS  (PRN):  oxyCODONE  5 mG/acetaminophen 325 mG 1 Tablet(s) Oral every 6 hours PRN Severe Pain (7 - 10)  acetaminophen   Tablet. 650 milliGRAM(s) Oral every 6 hours PRN Mild and Moderate Pain      Allergies    No Known Allergies    Intolerances      REVIEW OF SYSTEMS:  Constitutional: Denies fever, weight loss, fatigue  Resp: Denies SOB, cough, hemoptysis  CV: Denies chest pain, palpitations, dizziness  GI: Denies abdominal pain, N/V/D/C, melena, hematochezia  : Denies dysuria, increased frequency, hematuria  Neuro: Denies HA, weakness, numbness  Skin: Denies rashes, lesions  Lymph Nodes: Denies enlarged glands  MSK: + knee pain, swelling    Vital Signs Last 24 Hrs  T(C): 36.2 (05 Jul 2017 06:58), Max: 37.2 (04 Jul 2017 14:07)  T(F): 97.2 (05 Jul 2017 06:58), Max: 99 (04 Jul 2017 14:07)  HR: 64 (05 Jul 2017 06:58) (64 - 78)  BP: 133/80 (05 Jul 2017 06:58) (125/66 - 148/78)  BP(mean): --  RR: 18 (05 Jul 2017 06:58) (17 - 18)  SpO2: 98% (05 Jul 2017 06:58) (97% - 100%)    PHYSICAL EXAM:  General: NAD, well-groomed, well-developed  Eyes: EOMI, PERRLA, conjunctiva and sclera clear  ENMT: No tonsillar erythema, exudates, or enlargement; moist mucous membranes  Neck: Supple, no JVD  Nervous System: AAOX3, motor and sensation grossly intact in b/l UE and b/l LE  Psych: Appropriate affect and mood  Chest: Clear to auscultation bilaterally; no rales, rhonchi, or wheezing  Heart: Regular rate and rhythm; no murmurs, rubs, or gallops; no LE edema  Abd: +BS, soft, nontender, nondistended  Ext:  2+ radial and DP pulses    LABS:                        11.4   5.98  )-----------( 313      ( 05 Jul 2017 06:40 )             35.2     05 Jul 2017 06:40    137    |  98     |  16     ----------------------------<  132    3.8     |  25     |  0.69     Ca    9.1        05 Jul 2017 06:40  Phos  3.2       05 Jul 2017 06:40  Mg     2.3       05 Jul 2017 06:40    TPro  7.0    /  Alb  4.0    /  TBili  0.3    /  DBili  x      /  AST  19     /  ALT  12     /  AlkPhos  86     04 Jul 2017 16:35    PT/INR - ( 05 Jul 2017 06:40 )   PT: 26.5 SEC;   INR: 2.33          PTT - ( 04 Jul 2017 16:35 )  PTT:42.0 SEC      Radiology:  < from: US Duplex Venous Lower Ext Complete, Bilateral (07.04.17 @ 17:50) >  Bilateral Baker's cysts, measuring 2 cm in the right popliteal fossa and   6 cm in the left popliteal fossa.    < end of copied text >

## 2017-07-05 NOTE — DISCHARGE NOTE ADULT - MEDICATION SUMMARY - MEDICATIONS TO TAKE
I will START or STAY ON the medications listed below when I get home from the hospital:    acetaminophen 325 mg oral tablet  -- 2 tab(s) by mouth every 6 hours, As needed, Mild and Moderate Pain  -- Indication: For Leg pain    acetaminophen-oxycodone 325 mg-5 mg oral tablet  -- 1 tab(s) by mouth every 6 hours, As Needed -for severe pain MDD:4 tabs  -- Indication: For Leg pain    Coumadin 2.5 mg oral tablet  -- Monday, Thrusday: 3 tablets (7.5mg)  Tues,Wed,Fri,Sat,Sun: 2 tablets (5mg)  -- Indication: For Chronic deep vein thrombosis (DVT) of lower extremity, unspecified laterality, unspecified vein    Lyrica 75 mg oral capsule  -- 1 cap(s) by mouth 2 times a day  -- Indication: For Leg pain    losartan-hydrochlorothiazide 100mg-25mg oral tablet  -- 1 tab(s) by mouth once a day  -- Indication: For HTN    Advair Diskus 100 mcg-50 mcg inhalation powder  -- 1 puff(s) inhaled 2 times a day  -- Indication: For Asthma    Ventolin HFA 90 mcg/inh inhalation aerosol  -- 2 puff(s) inhaled 4 times a day, As Needed  -- Indication: For Asthma    amLODIPine 5 mg oral tablet  -- 1 tab(s) by mouth once a day  -- Indication: For HTN

## 2017-07-05 NOTE — PROGRESS NOTE ADULT - PROBLEM SELECTOR PLAN 5
- Patient was diagnosed last year  - Started on Prednisone but had severe swelling; stopped after three months   - Lyrica for pain associated with PMR  - Physical therapy

## 2017-07-05 NOTE — PROGRESS NOTE ADULT - ATTENDING COMMENTS
Patient seen and examined by me. Case discussed with resident and agree with the resident's findings and plan as documented in the resident's note. Patient wants to go home.   -patient refusing any outpatient physical therapy at this time as she reports she is planning to get "both her knees replaced and ankle in September 2017".  -will dose coumadin now as pt states she takes her "coumadin every morning at 8am".   -see d/c summary in sunrise  time macho moore'ing patient >30min.

## 2017-07-05 NOTE — DISCHARGE NOTE ADULT - MEDICATION SUMMARY - MEDICATIONS TO STOP TAKING
I will STOP taking the medications listed below when I get home from the hospital:    losartan 100 mg oral tablet  -- 1 tab(s) by mouth once a day

## 2017-07-05 NOTE — DISCHARGE NOTE ADULT - CARE PLAN
Principal Discharge DX:	Leg pain  Goal:	Control pain and follow-up with orthopedist  Instructions for follow-up, activity and diet:	Continue to take acetaminophen for mild to moderate pain as indicated and percocet for moderate to severe pain. Follow-up with Dr. Dick for knee pain [phone]  Secondary Diagnosis:	Chronic deep vein thrombosis (DVT) of lower extremity, unspecified laterality, unspecified vein  Secondary Diagnosis:	HTN (hypertension) Principal Discharge DX:	Leg pain  Goal:	Control pain and follow-up with orthopedist  Instructions for follow-up, activity and diet:	Continue to take acetaminophen for mild to moderate pain as indicated and percocet for moderate to severe pain. Follow-up with orthopedic surgeons Dr. Dick for knee pain and Dr. Winston or Dr. Rodriguez for ankle pain. Appointments can be made by calling [phone].  Secondary Diagnosis:	Chronic deep vein thrombosis (DVT) of lower extremity, unspecified laterality, unspecified vein  Goal:	Continue taking your coumadin.  Secondary Diagnosis:	HTN (hypertension)  Goal:	Blood pressure control  Instructions for follow-up, activity and diet:	Continue taking your amlodipine and losartan-hydrochlorothiazide as indicated and follow-up with your primary care doctor. Principal Discharge DX:	Leg pain  Goal:	Control pain and follow-up with orthopedist  Instructions for follow-up, activity and diet:	Continue to take acetaminophen for mild to moderate pain as indicated and percocet for moderate to severe pain. Follow-up with orthopedic surgeons Dr. Dick for knee pain and Dr. Winston or Dr. Rodriguez for ankle pain. Appointments can be made by calling [phone].  Secondary Diagnosis:	Chronic deep vein thrombosis (DVT) of lower extremity, unspecified laterality, unspecified vein  Goal:	Continue taking your coumadin.  Secondary Diagnosis:	HTN (hypertension)  Goal:	Blood pressure control  Instructions for follow-up, activity and diet:	Continue taking your amlodipine and losartan-hydrochlorothiazide as indicated and follow-up with your primary care doctor. Principal Discharge DX:	Leg pain  Goal:	Control pain and follow-up with orthopedist  Instructions for follow-up, activity and diet:	Continue to take acetaminophen for mild to moderate pain as indicated and percocet for moderate to severe pain. Follow-up with orthopedic surgeons Dr. Dick for knee pain and Dr. Winston or Dr. Rodriguez for ankle pain. Appointments can be made by calling [phone].  Secondary Diagnosis:	Chronic deep vein thrombosis (DVT) of lower extremity, unspecified laterality, unspecified vein  Goal:	Continue taking your coumadin.  Secondary Diagnosis:	HTN (hypertension)  Goal:	Blood pressure control  Instructions for follow-up, activity and diet:	Continue taking your amlodipine and losartan-hydrochlorothiazide as indicated and follow-up with your primary care doctor.

## 2017-07-05 NOTE — DIETITIAN INITIAL EVALUATION ADULT. - OTHER INFO
Pt states that her appetite has been good and she has been eating well. Nutrition consult was requested for Pt's BMI >40. Reviewed weight loss strategies with Pt and daughter. Pt has no complaints of GI distress nor of difficulty chewing or swallowing.

## 2017-07-05 NOTE — PROGRESS NOTE ADULT - PROBLEM SELECTOR PLAN 3
- No new acute DVT per US of the lower ext.   - IVC filter still in place   - Pt is on coumadin: Mon, Thurs 7.5mg/Rest of the week 5mg  - Monitor PT/INR daily  - Goal INR of 2-3 for DVT and extensive B/L PE history

## 2017-07-05 NOTE — CONSULT NOTE ADULT - SUBJECTIVE AND OBJECTIVE BOX
____ year old ___ presented to the ____ following _______.    PAST MEDICAL & SURGICAL HISTORY:  Polymyalgia rheumatica  Pancreatic abscess  Liver nodule  Deep venous thrombosis: 2005, 2014 x 2  PE (pulmonary embolism): s/p [place] Filter  Asthma  HTN (hypertension)  Varicose vein of leg: cauterization left leg vein 1989  History of appendectomy  [place] filter in place  S/P hip replacement: b/l hip replacement  S/P hysterectomy    Allergies    No Known Allergies    Vital Signs Last 24 Hrs  T(C): 36.2 (05 Jul 2017 06:58), Max: 37.2 (04 Jul 2017 14:07)  T(F): 97.2 (05 Jul 2017 06:58), Max: 99 (04 Jul 2017 14:07)  HR: 64 (05 Jul 2017 06:58) (64 - 78)  BP: 133/80 (05 Jul 2017 06:58) (125/66 - 148/78)  BP(mean): --  RR: 18 (05 Jul 2017 06:58) (17 - 18)  SpO2: 98% (05 Jul 2017 06:58) (97% - 100%)    XRay: no fractures, dislocations. Severe tricompartmental osteoarthritis  US: bilateral popliteal cysts, 6 cm in Left, 2cm in right    Exam:  Gen: NAD, barely palpable bilateral Baker's Cysts. Positive Babita, TTP over medial joint line. PROM 0-80 degrees, no varus/valgus laxity, TTP over popliteal fossa, significant bilateral ankle deformity  Motor: EHL/FHL grossly intact, able to SLR,  Sensory: SILT DP/SP/S/S/T Nerve Distributions    A/P: 71 year old female with bilateral Baker's Cysts  - Pain Control  - PT/OT, OOB  - WBAT with walker  - No acute orthopedic intervention.  - Follow up with Dr. Dick for knee pain. [phone]  - Follow up with Dr. Winston or Dr. Rodriguez for ankle pain

## 2017-07-05 NOTE — DISCHARGE NOTE ADULT - CARE PROVIDER_API CALL
Giuseppe Winston), Orthopaedic Surgery  [address] [place] [address]  Phone: [phone]  Fax: [phone]    Dionicio Dick), Orthopaedic Surgery  [address] [place] [address]  Phone: [phone]  Fax: [phone]

## 2017-07-10 ENCOUNTER — FORM ENCOUNTER (OUTPATIENT)
Age: 72
End: 2017-07-10

## 2017-07-11 ENCOUNTER — APPOINTMENT (OUTPATIENT)
Dept: RADIOLOGY | Facility: CLINIC | Age: 72
End: 2017-07-11

## 2017-07-11 ENCOUNTER — OUTPATIENT (OUTPATIENT)
Dept: OUTPATIENT SERVICES | Facility: HOSPITAL | Age: 72
LOS: 1 days | End: 2017-07-11
Payer: MEDICARE

## 2017-07-11 ENCOUNTER — APPOINTMENT (OUTPATIENT)
Dept: CT IMAGING | Facility: CLINIC | Age: 72
End: 2017-07-11

## 2017-07-11 DIAGNOSIS — I83.93 ASYMPTOMATIC VARICOSE VEINS OF BILATERAL LOWER EXTREMITIES: Chronic | ICD-10-CM

## 2017-07-11 DIAGNOSIS — M25.571 PAIN IN RIGHT ANKLE AND JOINTS OF RIGHT FOOT: ICD-10-CM

## 2017-07-11 DIAGNOSIS — M21.40 FLAT FOOT [PES PLANUS] (ACQUIRED), UNSPECIFIED FOOT: ICD-10-CM

## 2017-07-11 DIAGNOSIS — M19.079 PRIMARY OSTEOARTHRITIS, UNSPECIFIED ANKLE AND FOOT: ICD-10-CM

## 2017-07-11 DIAGNOSIS — Z98.89 OTHER SPECIFIED POSTPROCEDURAL STATES: Chronic | ICD-10-CM

## 2017-07-11 PROCEDURE — 76376 3D RENDER W/INTRP POSTPROCES: CPT

## 2017-07-11 PROCEDURE — 73610 X-RAY EXAM OF ANKLE: CPT

## 2017-07-11 PROCEDURE — 73700 CT LOWER EXTREMITY W/O DYE: CPT

## 2017-07-17 DIAGNOSIS — M79.89 OTHER SPECIFIED SOFT TISSUE DISORDERS: ICD-10-CM

## 2017-07-17 DIAGNOSIS — M19.071 PRIMARY OSTEOARTHRITIS, RIGHT ANKLE AND FOOT: ICD-10-CM

## 2017-07-20 ENCOUNTER — APPOINTMENT (OUTPATIENT)
Dept: ORTHOPEDIC SURGERY | Facility: CLINIC | Age: 72
End: 2017-07-20

## 2017-07-20 VITALS
BODY MASS INDEX: 46.01 KG/M2 | HEART RATE: 74 BPM | WEIGHT: 250 LBS | HEIGHT: 62 IN | DIASTOLIC BLOOD PRESSURE: 78 MMHG | SYSTOLIC BLOOD PRESSURE: 116 MMHG

## 2017-07-20 DIAGNOSIS — M48.06 SPINAL STENOSIS, LUMBAR REGION: ICD-10-CM

## 2017-07-21 ENCOUNTER — OUTPATIENT (OUTPATIENT)
Dept: OUTPATIENT SERVICES | Facility: HOSPITAL | Age: 72
LOS: 1 days | Discharge: HOME | End: 2017-07-21

## 2017-07-21 DIAGNOSIS — I83.93 ASYMPTOMATIC VARICOSE VEINS OF BILATERAL LOWER EXTREMITIES: Chronic | ICD-10-CM

## 2017-07-21 DIAGNOSIS — D64.9 ANEMIA, UNSPECIFIED: ICD-10-CM

## 2017-07-21 DIAGNOSIS — Z98.89 OTHER SPECIFIED POSTPROCEDURAL STATES: Chronic | ICD-10-CM

## 2017-07-24 ENCOUNTER — APPOINTMENT (OUTPATIENT)
Dept: HEMATOLOGY ONCOLOGY | Facility: CLINIC | Age: 72
End: 2017-07-24

## 2017-07-25 ENCOUNTER — APPOINTMENT (OUTPATIENT)
Dept: ORTHOPEDIC SURGERY | Facility: CLINIC | Age: 72
End: 2017-07-25

## 2017-07-25 VITALS
HEART RATE: 73 BPM | BODY MASS INDEX: 46.01 KG/M2 | DIASTOLIC BLOOD PRESSURE: 79 MMHG | SYSTOLIC BLOOD PRESSURE: 131 MMHG | WEIGHT: 250 LBS | HEIGHT: 62 IN

## 2017-07-26 ENCOUNTER — APPOINTMENT (OUTPATIENT)
Dept: HEMATOLOGY ONCOLOGY | Facility: CLINIC | Age: 72
End: 2017-07-26

## 2017-07-26 ENCOUNTER — RESULT REVIEW (OUTPATIENT)
Age: 72
End: 2017-07-26

## 2017-07-26 VITALS
OXYGEN SATURATION: 98 % | BODY MASS INDEX: 42.9 KG/M2 | DIASTOLIC BLOOD PRESSURE: 88 MMHG | RESPIRATION RATE: 16 BRPM | HEART RATE: 77 BPM | TEMPERATURE: 98.3 F | HEIGHT: 63.39 IN | SYSTOLIC BLOOD PRESSURE: 158 MMHG | WEIGHT: 245.15 LBS

## 2017-07-26 DIAGNOSIS — M19.079 PRIMARY OSTEOARTHRITIS, UNSPECIFIED ANKLE AND FOOT: ICD-10-CM

## 2017-07-26 LAB
HCT VFR BLD CALC: 35.7 % — SIGNIFICANT CHANGE UP (ref 34.5–45)
HGB BLD-MCNC: 11.9 G/DL — SIGNIFICANT CHANGE UP (ref 11.5–15.5)
MCHC RBC-ENTMCNC: 23.3 PG — LOW (ref 27–34)
MCHC RBC-ENTMCNC: 33.4 G/DL — SIGNIFICANT CHANGE UP (ref 32–36)
MCV RBC AUTO: 70 FL — LOW (ref 80–100)
PLATELET # BLD AUTO: 285 K/UL — SIGNIFICANT CHANGE UP (ref 150–400)
RBC # BLD: 5.09 M/UL — SIGNIFICANT CHANGE UP (ref 3.8–5.2)
RBC # FLD: 14.1 % — SIGNIFICANT CHANGE UP (ref 10.3–14.5)
WBC # BLD: 5.8 K/UL — SIGNIFICANT CHANGE UP (ref 3.8–10.5)
WBC # FLD AUTO: 5.8 K/UL — SIGNIFICANT CHANGE UP (ref 3.8–10.5)

## 2017-07-26 RX ORDER — TRAMADOL HYDROCHLORIDE 50 MG/1
50 TABLET, COATED ORAL
Qty: 90 | Refills: 0 | Status: DISCONTINUED | COMMUNITY
Start: 2017-07-20 | End: 2017-07-26

## 2017-07-27 ENCOUNTER — APPOINTMENT (OUTPATIENT)
Dept: ORTHOPEDIC SURGERY | Facility: CLINIC | Age: 72
End: 2017-07-27
Payer: MEDICARE

## 2017-07-27 VITALS
WEIGHT: 250 LBS | DIASTOLIC BLOOD PRESSURE: 77 MMHG | HEART RATE: 76 BPM | SYSTOLIC BLOOD PRESSURE: 129 MMHG | HEIGHT: 62 IN | BODY MASS INDEX: 46.01 KG/M2

## 2017-07-27 DIAGNOSIS — Z78.9 OTHER SPECIFIED HEALTH STATUS: ICD-10-CM

## 2017-07-27 DIAGNOSIS — Z09 ENCOUNTER FOR FOLLOW-UP EXAMINATION AFTER COMPLETED TREATMENT FOR CONDITIONS OTHER THAN MALIGNANT NEOPLASM: ICD-10-CM

## 2017-07-27 DIAGNOSIS — M43.16 SPONDYLOLISTHESIS, LUMBAR REGION: ICD-10-CM

## 2017-07-27 DIAGNOSIS — Z87.39 PERSONAL HISTORY OF OTHER DISEASES OF THE MUSCULOSKELETAL SYSTEM AND CONNECTIVE TISSUE: ICD-10-CM

## 2017-07-27 DIAGNOSIS — Z96.643 PRESENCE OF ARTIFICIAL HIP JOINT, BILATERAL: ICD-10-CM

## 2017-07-27 PROCEDURE — 99215 OFFICE O/P EST HI 40 MIN: CPT

## 2017-07-28 DIAGNOSIS — I82.409 ACUTE EMBOLISM AND THROMBOSIS OF UNSPECIFIED DEEP VEINS OF UNSPECIFIED LOWER EXTREMITY: ICD-10-CM

## 2017-08-07 ENCOUNTER — APPOINTMENT (OUTPATIENT)
Dept: RHEUMATOLOGY | Facility: CLINIC | Age: 72
End: 2017-08-07
Payer: MEDICARE

## 2017-08-07 VITALS
OXYGEN SATURATION: 98 % | TEMPERATURE: 98.3 F | HEART RATE: 75 BPM | SYSTOLIC BLOOD PRESSURE: 153 MMHG | HEIGHT: 62 IN | WEIGHT: 246 LBS | DIASTOLIC BLOOD PRESSURE: 90 MMHG | BODY MASS INDEX: 45.27 KG/M2

## 2017-08-07 PROCEDURE — 99213 OFFICE O/P EST LOW 20 MIN: CPT

## 2017-08-15 ENCOUNTER — APPOINTMENT (OUTPATIENT)
Dept: VASCULAR SURGERY | Facility: CLINIC | Age: 72
End: 2017-08-15
Payer: MEDICARE

## 2017-08-15 VITALS
TEMPERATURE: 98.6 F | SYSTOLIC BLOOD PRESSURE: 170 MMHG | HEART RATE: 73 BPM | RESPIRATION RATE: 16 BRPM | DIASTOLIC BLOOD PRESSURE: 97 MMHG

## 2017-08-15 DIAGNOSIS — Z78.9 OTHER SPECIFIED HEALTH STATUS: ICD-10-CM

## 2017-08-15 PROCEDURE — 99203 OFFICE O/P NEW LOW 30 MIN: CPT

## 2017-08-16 ENCOUNTER — APPOINTMENT (OUTPATIENT)
Dept: HEMATOLOGY ONCOLOGY | Facility: CLINIC | Age: 72
End: 2017-08-16
Payer: MEDICARE

## 2017-08-16 VITALS
SYSTOLIC BLOOD PRESSURE: 158 MMHG | RESPIRATION RATE: 16 BRPM | TEMPERATURE: 98.5 F | OXYGEN SATURATION: 98 % | HEART RATE: 69 BPM | DIASTOLIC BLOOD PRESSURE: 88 MMHG

## 2017-08-16 DIAGNOSIS — M21.40 FLAT FOOT [PES PLANUS] (ACQUIRED), UNSPECIFIED FOOT: ICD-10-CM

## 2017-08-16 PROCEDURE — 99215 OFFICE O/P EST HI 40 MIN: CPT

## 2017-08-17 ENCOUNTER — APPOINTMENT (OUTPATIENT)
Dept: CARDIOLOGY | Facility: CLINIC | Age: 72
End: 2017-08-17
Payer: MEDICARE

## 2017-08-17 ENCOUNTER — NON-APPOINTMENT (OUTPATIENT)
Age: 72
End: 2017-08-17

## 2017-08-17 VITALS
WEIGHT: 245 LBS | HEART RATE: 69 BPM | DIASTOLIC BLOOD PRESSURE: 77 MMHG | OXYGEN SATURATION: 98 % | HEIGHT: 62 IN | BODY MASS INDEX: 45.08 KG/M2 | SYSTOLIC BLOOD PRESSURE: 124 MMHG

## 2017-08-17 DIAGNOSIS — M35.3 POLYMYALGIA RHEUMATICA: ICD-10-CM

## 2017-08-17 PROCEDURE — 93306 TTE W/DOPPLER COMPLETE: CPT

## 2017-08-17 PROCEDURE — 93000 ELECTROCARDIOGRAM COMPLETE: CPT

## 2017-08-17 PROCEDURE — 99205 OFFICE O/P NEW HI 60 MIN: CPT

## 2017-08-21 ENCOUNTER — OUTPATIENT (OUTPATIENT)
Dept: OUTPATIENT SERVICES | Facility: HOSPITAL | Age: 72
LOS: 1 days | End: 2017-08-21
Payer: MEDICARE

## 2017-08-21 VITALS
HEIGHT: 62 IN | SYSTOLIC BLOOD PRESSURE: 138 MMHG | WEIGHT: 244.71 LBS | HEART RATE: 74 BPM | TEMPERATURE: 98 F | DIASTOLIC BLOOD PRESSURE: 76 MMHG | RESPIRATION RATE: 16 BRPM

## 2017-08-21 DIAGNOSIS — Z09 ENCOUNTER FOR FOLLOW-UP EXAMINATION AFTER COMPLETED TREATMENT FOR CONDITIONS OTHER THAN MALIGNANT NEOPLASM: Chronic | ICD-10-CM

## 2017-08-21 DIAGNOSIS — M79.671 PAIN IN RIGHT FOOT: ICD-10-CM

## 2017-08-21 DIAGNOSIS — M19.079 PRIMARY OSTEOARTHRITIS, UNSPECIFIED ANKLE AND FOOT: ICD-10-CM

## 2017-08-21 DIAGNOSIS — D68.59 OTHER PRIMARY THROMBOPHILIA: ICD-10-CM

## 2017-08-21 DIAGNOSIS — Z98.89 OTHER SPECIFIED POSTPROCEDURAL STATES: Chronic | ICD-10-CM

## 2017-08-21 DIAGNOSIS — Z01.818 ENCOUNTER FOR OTHER PREPROCEDURAL EXAMINATION: ICD-10-CM

## 2017-08-21 DIAGNOSIS — I83.93 ASYMPTOMATIC VARICOSE VEINS OF BILATERAL LOWER EXTREMITIES: Chronic | ICD-10-CM

## 2017-08-21 LAB
ANION GAP SERPL CALC-SCNC: 8 MMOL/L — SIGNIFICANT CHANGE UP (ref 5–17)
APTT BLD: 41.3 SEC — HIGH (ref 27.5–37.4)
BUN SERPL-MCNC: 13 MG/DL — SIGNIFICANT CHANGE UP (ref 7–23)
CALCIUM SERPL-MCNC: 8.7 MG/DL — SIGNIFICANT CHANGE UP (ref 8.4–10.5)
CHLORIDE SERPL-SCNC: 101 MMOL/L — SIGNIFICANT CHANGE UP (ref 96–108)
CO2 SERPL-SCNC: 30 MMOL/L — SIGNIFICANT CHANGE UP (ref 22–31)
CREAT SERPL-MCNC: 0.89 MG/DL — SIGNIFICANT CHANGE UP (ref 0.5–1.3)
GLUCOSE SERPL-MCNC: 81 MG/DL — SIGNIFICANT CHANGE UP (ref 70–99)
HCT VFR BLD CALC: 40 % — SIGNIFICANT CHANGE UP (ref 34.5–45)
HGB BLD-MCNC: 12.7 G/DL — SIGNIFICANT CHANGE UP (ref 11.5–15.5)
INR BLD: 2.45 RATIO — HIGH (ref 0.88–1.16)
MCHC RBC-ENTMCNC: 23.5 PG — LOW (ref 27–34)
MCHC RBC-ENTMCNC: 31.8 GM/DL — LOW (ref 32–36)
MCV RBC AUTO: 73.8 FL — LOW (ref 80–100)
PLATELET # BLD AUTO: 348 K/UL — SIGNIFICANT CHANGE UP (ref 150–400)
POTASSIUM SERPL-MCNC: 3.4 MMOL/L — LOW (ref 3.5–5.3)
POTASSIUM SERPL-SCNC: 3.4 MMOL/L — LOW (ref 3.5–5.3)
PROTHROM AB SERPL-ACNC: 27.2 SEC — HIGH (ref 9.8–12.7)
RBC # BLD: 5.42 M/UL — HIGH (ref 3.8–5.2)
RBC # FLD: 13.8 % — SIGNIFICANT CHANGE UP (ref 10.3–14.5)
SODIUM SERPL-SCNC: 139 MMOL/L — SIGNIFICANT CHANGE UP (ref 135–145)
WBC # BLD: 5 K/UL — SIGNIFICANT CHANGE UP (ref 3.8–10.5)
WBC # FLD AUTO: 5 K/UL — SIGNIFICANT CHANGE UP (ref 3.8–10.5)

## 2017-08-21 PROCEDURE — 85610 PROTHROMBIN TIME: CPT

## 2017-08-21 PROCEDURE — 80048 BASIC METABOLIC PNL TOTAL CA: CPT

## 2017-08-21 PROCEDURE — G0463: CPT

## 2017-08-21 PROCEDURE — 85027 COMPLETE CBC AUTOMATED: CPT

## 2017-08-21 PROCEDURE — 85730 THROMBOPLASTIN TIME PARTIAL: CPT

## 2017-08-21 NOTE — H&P PST ADULT - HISTORY OF PRESENT ILLNESS
70 yo female presents with 2 year history of "limp" and rotation of foot to the right.  Pain at rest is 10/10, but is intermittent throughout the day.  Pt takes Tylenol but it offers only minamul rilief. 70 yo female presents with 2 year history of "limp" and rotation of foot to the right. Pt she has "flat feet" and arthritis of her feet.   Pain at rest is 10/10, but is intermittent throughout the day. Pt reports a cool sensation and numbness to lateral portion of right foot.   Pt takes Tylenol but it offers only minimal l relief, ambulates with walker.

## 2017-08-21 NOTE — H&P PST ADULT - ASSESSMENT
Pt presents to PST.  Pre op instructions were reviewed and understood.  Pt has seen cardiologist, hematologist and vascular doctors for clearances.  Medical clearance will not be done until 9/5.

## 2017-08-21 NOTE — H&P PST ADULT - PSH
[place] filter in place    History of appendectomy    S/P hip replacement  b/l hip replacement  S/P hysterectomy    S/P umbilical hernia repair, follow-up exam    Varicose vein of leg  cauterization left leg vein 1989

## 2017-08-21 NOTE — H&P PST ADULT - PROBLEM SELECTOR PLAN 2
vascular/hematology clearances.  Protein S level requested/scheduled by hematologist for 9/6.  Instructions to hold Warfarin 1 week pre op and begin lovenox were given by Dr Villa.

## 2017-08-21 NOTE — H&P PST ADULT - PMH
Asthma  well controlled, no recent hospitalizations  Deep venous thrombosis  2005, 2014 x 2  Flat feet, bilateral    Foot pain, right    HTN (hypertension)    Liver nodule    Pancreatic abscess    PE (pulmonary embolism)  s/p [place] Filter- 2005  Polymyalgia rheumatica    Protein S deficiency  unconfirmed as of yet Asthma  well controlled, no recent hospitalizations  Deep venous thrombosis  2005, 2014 x 2  Flat feet, bilateral    Foot pain, right    HTN (hypertension)    Liver nodule    Neuropathy  right foot  Osteoarthritis    PE (pulmonary embolism)  s/p [place] Filter- 2005  Polymyalgia rheumatica    Protein S deficiency  unconfirmed as of yet  Spondylisthesis    Thalassemia, beta

## 2017-08-21 NOTE — H&P PST ADULT - NSANTHOSAYNRD_GEN_A_CORE
No. MITCHELL screening performed.  STOP BANG Legend: 0-2 = LOW Risk; 3-4 = INTERMEDIATE Risk; 5-8 = HIGH Risk

## 2017-08-29 ENCOUNTER — APPOINTMENT (OUTPATIENT)
Dept: CARDIOLOGY | Facility: CLINIC | Age: 72
End: 2017-08-29

## 2017-08-29 RX ORDER — SODIUM CHLORIDE 9 MG/ML
1000 INJECTION, SOLUTION INTRAVENOUS
Qty: 0 | Refills: 0 | Status: DISCONTINUED | OUTPATIENT
Start: 2017-09-07 | End: 2017-09-07

## 2017-08-31 ENCOUNTER — OUTPATIENT (OUTPATIENT)
Dept: OUTPATIENT SERVICES | Facility: HOSPITAL | Age: 72
LOS: 1 days | Discharge: HOME | End: 2017-08-31

## 2017-08-31 DIAGNOSIS — I82.409 ACUTE EMBOLISM AND THROMBOSIS OF UNSPECIFIED DEEP VEINS OF UNSPECIFIED LOWER EXTREMITY: ICD-10-CM

## 2017-08-31 DIAGNOSIS — Z98.89 OTHER SPECIFIED POSTPROCEDURAL STATES: Chronic | ICD-10-CM

## 2017-08-31 DIAGNOSIS — Z09 ENCOUNTER FOR FOLLOW-UP EXAMINATION AFTER COMPLETED TREATMENT FOR CONDITIONS OTHER THAN MALIGNANT NEOPLASM: Chronic | ICD-10-CM

## 2017-08-31 DIAGNOSIS — I83.93 ASYMPTOMATIC VARICOSE VEINS OF BILATERAL LOWER EXTREMITIES: Chronic | ICD-10-CM

## 2017-08-31 RX ORDER — ENOXAPARIN SODIUM 100 MG/ML
1 INJECTION SUBCUTANEOUS
Qty: 0 | Refills: 0 | COMMUNITY
Start: 2017-08-31

## 2017-09-05 ENCOUNTER — APPOINTMENT (OUTPATIENT)
Dept: VASCULAR SURGERY | Facility: CLINIC | Age: 72
End: 2017-09-05
Payer: MEDICARE

## 2017-09-05 PROCEDURE — 93979 VASCULAR STUDY: CPT

## 2017-09-06 ENCOUNTER — RESULT REVIEW (OUTPATIENT)
Age: 72
End: 2017-09-06

## 2017-09-06 ENCOUNTER — LABORATORY RESULT (OUTPATIENT)
Age: 72
End: 2017-09-06

## 2017-09-06 ENCOUNTER — APPOINTMENT (OUTPATIENT)
Dept: HEMATOLOGY ONCOLOGY | Facility: CLINIC | Age: 72
End: 2017-09-06

## 2017-09-06 LAB
HCT VFR BLD CALC: 33.8 % — LOW (ref 34.5–45)
HGB BLD-MCNC: 11.3 G/DL — LOW (ref 11.5–15.5)
MCHC RBC-ENTMCNC: 23.6 PG — LOW (ref 27–34)
MCHC RBC-ENTMCNC: 33.3 G/DL — SIGNIFICANT CHANGE UP (ref 32–36)
MCV RBC AUTO: 71.1 FL — LOW (ref 80–100)
PLATELET # BLD AUTO: 296 K/UL — SIGNIFICANT CHANGE UP (ref 150–400)
RBC # BLD: 4.76 M/UL — SIGNIFICANT CHANGE UP (ref 3.8–5.2)
RBC # FLD: 14.3 % — SIGNIFICANT CHANGE UP (ref 10.3–14.5)
WBC # BLD: 5.3 K/UL — SIGNIFICANT CHANGE UP (ref 3.8–10.5)
WBC # FLD AUTO: 5.3 K/UL — SIGNIFICANT CHANGE UP (ref 3.8–10.5)

## 2017-09-06 RX ORDER — ONDANSETRON 8 MG/1
4 TABLET, FILM COATED ORAL EVERY 6 HOURS
Qty: 0 | Refills: 0 | Status: DISCONTINUED | OUTPATIENT
Start: 2017-09-07 | End: 2017-09-09

## 2017-09-06 RX ORDER — SODIUM CHLORIDE 9 MG/ML
1000 INJECTION, SOLUTION INTRAVENOUS
Qty: 0 | Refills: 0 | Status: DISCONTINUED | OUTPATIENT
Start: 2017-09-07 | End: 2017-09-09

## 2017-09-06 RX ORDER — CELECOXIB 200 MG/1
200 CAPSULE ORAL ONCE
Qty: 0 | Refills: 0 | Status: COMPLETED | OUTPATIENT
Start: 2017-09-07 | End: 2017-09-07

## 2017-09-06 RX ORDER — MAGNESIUM HYDROXIDE 400 MG/1
30 TABLET, CHEWABLE ORAL DAILY
Qty: 0 | Refills: 0 | Status: DISCONTINUED | OUTPATIENT
Start: 2017-09-07 | End: 2017-09-09

## 2017-09-06 RX ORDER — DOCUSATE SODIUM 100 MG
100 CAPSULE ORAL THREE TIMES A DAY
Qty: 0 | Refills: 0 | Status: DISCONTINUED | OUTPATIENT
Start: 2017-09-07 | End: 2017-09-09

## 2017-09-06 RX ORDER — FAMOTIDINE 10 MG/ML
1 INJECTION INTRAVENOUS
Qty: 0 | Refills: 0 | COMMUNITY
Start: 2017-09-06 | End: 2017-09-07

## 2017-09-06 NOTE — PATIENT PROFILE ADULT. - PMH
Asthma    Deep venous thrombosis  2005, 2014 x 2  HTN (hypertension)    Liver nodule    Pancreatic abscess    PE (pulmonary embolism)  s/p [place] Filter  Polymyalgia rheumatica Asthma  well controlled, no recent hospitalizations  Deep venous thrombosis  2005, 2014 x 2  Flat feet, bilateral    Foot pain, right    HTN (hypertension)    Liver nodule    Neuropathy  right foot  Osteoarthritis    PE (pulmonary embolism)  s/p [place] Filter- 2005  Polymyalgia rheumatica    Protein S deficiency  unconfirmed as of yet  Spondylisthesis    Thalassemia, beta

## 2017-09-06 NOTE — PATIENT PROFILE ADULT. - PSH
[place] filter in place    History of appendectomy    S/P hip replacement  b/l hip replacement  S/P hysterectomy    Varicose vein of leg  cauterization left leg vein 1989 [place] filter in place    H/O eye surgery  torned  retinal -3 years ago  History of appendectomy    S/P hip replacement  b/l hip replacement  S/P hysterectomy    S/P umbilical hernia repair, follow-up exam    Varicose vein of leg  cauterization left leg vein 1989

## 2017-09-07 ENCOUNTER — RESULT REVIEW (OUTPATIENT)
Age: 72
End: 2017-09-07

## 2017-09-07 ENCOUNTER — APPOINTMENT (OUTPATIENT)
Dept: ORTHOPEDIC SURGERY | Facility: HOSPITAL | Age: 72
End: 2017-09-07

## 2017-09-07 ENCOUNTER — INPATIENT (INPATIENT)
Facility: HOSPITAL | Age: 72
LOS: 1 days | Discharge: NURSING, REHAB OR LONG-TERM CARE | DRG: 493 | End: 2017-09-09
Attending: ORTHOPAEDIC SURGERY | Admitting: ORTHOPAEDIC SURGERY
Payer: MEDICARE

## 2017-09-07 VITALS
RESPIRATION RATE: 15 BRPM | SYSTOLIC BLOOD PRESSURE: 147 MMHG | HEART RATE: 70 BPM | OXYGEN SATURATION: 99 % | DIASTOLIC BLOOD PRESSURE: 72 MMHG | TEMPERATURE: 98 F | HEIGHT: 62 IN | WEIGHT: 244.49 LBS

## 2017-09-07 DIAGNOSIS — I83.93 ASYMPTOMATIC VARICOSE VEINS OF BILATERAL LOWER EXTREMITIES: Chronic | ICD-10-CM

## 2017-09-07 DIAGNOSIS — Z98.89 OTHER SPECIFIED POSTPROCEDURAL STATES: Chronic | ICD-10-CM

## 2017-09-07 DIAGNOSIS — M79.671 PAIN IN RIGHT FOOT: ICD-10-CM

## 2017-09-07 DIAGNOSIS — D68.59 OTHER PRIMARY THROMBOPHILIA: ICD-10-CM

## 2017-09-07 DIAGNOSIS — M19.079 PRIMARY OSTEOARTHRITIS, UNSPECIFIED ANKLE AND FOOT: ICD-10-CM

## 2017-09-07 DIAGNOSIS — J45.909 UNSPECIFIED ASTHMA, UNCOMPLICATED: ICD-10-CM

## 2017-09-07 DIAGNOSIS — I10 ESSENTIAL (PRIMARY) HYPERTENSION: ICD-10-CM

## 2017-09-07 DIAGNOSIS — Z98.890 OTHER SPECIFIED POSTPROCEDURAL STATES: Chronic | ICD-10-CM

## 2017-09-07 DIAGNOSIS — Z09 ENCOUNTER FOR FOLLOW-UP EXAMINATION AFTER COMPLETED TREATMENT FOR CONDITIONS OTHER THAN MALIGNANT NEOPLASM: Chronic | ICD-10-CM

## 2017-09-07 LAB
INR BLD: 1.1 RATIO — SIGNIFICANT CHANGE UP (ref 0.88–1.16)
POTASSIUM SERPL-MCNC: 4.2 MMOL/L — SIGNIFICANT CHANGE UP (ref 3.5–5.3)
POTASSIUM SERPL-SCNC: 4.2 MMOL/L — SIGNIFICANT CHANGE UP (ref 3.5–5.3)
PROTHROM AB SERPL-ACNC: 12 SEC — SIGNIFICANT CHANGE UP (ref 9.8–12.7)

## 2017-09-07 PROCEDURE — 28715 ARTHRODESIS TRIPLE: CPT | Mod: RT

## 2017-09-07 PROCEDURE — 99223 1ST HOSP IP/OBS HIGH 75: CPT

## 2017-09-07 PROCEDURE — 88305 TISSUE EXAM BY PATHOLOGIST: CPT | Mod: 26

## 2017-09-07 PROCEDURE — 88311 DECALCIFY TISSUE: CPT | Mod: 26

## 2017-09-07 RX ORDER — ONDANSETRON 8 MG/1
4 TABLET, FILM COATED ORAL ONCE
Qty: 0 | Refills: 0 | Status: DISCONTINUED | OUTPATIENT
Start: 2017-09-07 | End: 2017-09-07

## 2017-09-07 RX ORDER — BUDESONIDE AND FORMOTEROL FUMARATE DIHYDRATE 160; 4.5 UG/1; UG/1
2 AEROSOL RESPIRATORY (INHALATION)
Qty: 0 | Refills: 0 | Status: DISCONTINUED | OUTPATIENT
Start: 2017-09-07 | End: 2017-09-09

## 2017-09-07 RX ORDER — ALBUTEROL 90 UG/1
2 AEROSOL, METERED ORAL EVERY 6 HOURS
Qty: 0 | Refills: 0 | Status: DISCONTINUED | OUTPATIENT
Start: 2017-09-07 | End: 2017-09-09

## 2017-09-07 RX ORDER — CEFAZOLIN SODIUM 1 G
2000 VIAL (EA) INJECTION ONCE
Qty: 0 | Refills: 0 | Status: COMPLETED | OUTPATIENT
Start: 2017-09-07 | End: 2017-09-07

## 2017-09-07 RX ORDER — CEFAZOLIN SODIUM 1 G
2000 VIAL (EA) INJECTION EVERY 8 HOURS
Qty: 0 | Refills: 0 | Status: COMPLETED | OUTPATIENT
Start: 2017-09-08 | End: 2017-09-08

## 2017-09-07 RX ORDER — ENOXAPARIN SODIUM 100 MG/ML
110 INJECTION SUBCUTANEOUS EVERY 12 HOURS
Qty: 0 | Refills: 0 | Status: DISCONTINUED | OUTPATIENT
Start: 2017-09-08 | End: 2017-09-09

## 2017-09-07 RX ORDER — AMLODIPINE BESYLATE 2.5 MG/1
5 TABLET ORAL DAILY
Qty: 0 | Refills: 0 | Status: DISCONTINUED | OUTPATIENT
Start: 2017-09-07 | End: 2017-09-09

## 2017-09-07 RX ORDER — ACETAMINOPHEN 500 MG
1000 TABLET ORAL EVERY 12 HOURS
Qty: 0 | Refills: 0 | Status: DISCONTINUED | OUTPATIENT
Start: 2017-09-08 | End: 2017-09-09

## 2017-09-07 RX ORDER — ACETAMINOPHEN 500 MG
1000 TABLET ORAL EVERY 6 HOURS
Qty: 0 | Refills: 0 | Status: COMPLETED | OUTPATIENT
Start: 2017-09-08 | End: 2017-09-08

## 2017-09-07 RX ORDER — WARFARIN SODIUM 2.5 MG/1
5 TABLET ORAL ONCE
Qty: 0 | Refills: 0 | Status: COMPLETED | OUTPATIENT
Start: 2017-09-07 | End: 2017-09-07

## 2017-09-07 RX ORDER — LOSARTAN POTASSIUM 100 MG/1
100 TABLET, FILM COATED ORAL DAILY
Qty: 0 | Refills: 0 | Status: DISCONTINUED | OUTPATIENT
Start: 2017-09-09 | End: 2017-09-09

## 2017-09-07 RX ORDER — SODIUM CHLORIDE 9 MG/ML
1000 INJECTION, SOLUTION INTRAVENOUS
Qty: 0 | Refills: 0 | Status: DISCONTINUED | OUTPATIENT
Start: 2017-09-07 | End: 2017-09-07

## 2017-09-07 RX ORDER — ACETAMINOPHEN 500 MG
1000 TABLET ORAL ONCE
Qty: 0 | Refills: 0 | Status: COMPLETED | OUTPATIENT
Start: 2017-09-07 | End: 2017-09-07

## 2017-09-07 RX ORDER — APREPITANT 80 MG/1
40 CAPSULE ORAL ONCE
Qty: 0 | Refills: 0 | Status: COMPLETED | OUTPATIENT
Start: 2017-09-07 | End: 2017-09-07

## 2017-09-07 RX ORDER — HYDROMORPHONE HYDROCHLORIDE 2 MG/ML
0.5 INJECTION INTRAMUSCULAR; INTRAVENOUS; SUBCUTANEOUS
Qty: 0 | Refills: 0 | Status: DISCONTINUED | OUTPATIENT
Start: 2017-09-07 | End: 2017-09-07

## 2017-09-07 RX ADMIN — CELECOXIB 200 MILLIGRAM(S): 200 CAPSULE ORAL at 13:36

## 2017-09-07 RX ADMIN — HYDROMORPHONE HYDROCHLORIDE 0.5 MILLIGRAM(S): 2 INJECTION INTRAMUSCULAR; INTRAVENOUS; SUBCUTANEOUS at 20:08

## 2017-09-07 RX ADMIN — WARFARIN SODIUM 5 MILLIGRAM(S): 2.5 TABLET ORAL at 22:31

## 2017-09-07 RX ADMIN — SODIUM CHLORIDE 100 MILLILITER(S): 9 INJECTION, SOLUTION INTRAVENOUS at 20:09

## 2017-09-07 RX ADMIN — Medication 100 MILLIGRAM(S): at 22:31

## 2017-09-07 RX ADMIN — APREPITANT 40 MILLIGRAM(S): 80 CAPSULE ORAL at 13:37

## 2017-09-07 RX ADMIN — SODIUM CHLORIDE 75 MILLILITER(S): 9 INJECTION, SOLUTION INTRAVENOUS at 10:59

## 2017-09-07 NOTE — CONSULT NOTE ADULT - ASSESSMENT
71F with protein S deficiency complicated by multiple DVTs, PE s/p IVC filter and is on coumadin, polymyalgia rheumatica, asthma, HTN, beta-thalassemia, osteoarthritis who presents electively for right foot surgery.

## 2017-09-07 NOTE — CONSULT NOTE ADULT - SUBJECTIVE AND OBJECTIVE BOX
71F with protein S deficiency complicated by multiple DVTs, PE s/p IVC filter and is on coumadin, polymyalgia rheumatica, asthma, HTN, beta-thalassemia, osteoarthritis who presents electively for right foot surgery.  Has been experiencing pain from her arthritis.  Patient was cleared by pulmonology, cardiology, and vascular for the surgery.  Currently, patient is sleepy from the anesthesia and pain medication but denies any acute pain.  Denies any numbness in her right foot.  Denies any nausea.      PAST MEDICAL & SURGICAL HISTORY:  Neuropathy: right foot  Spondylisthesis  Thalassemia, beta  Osteoarthritis  Flat feet, bilateral  Foot pain, right  Protein S deficiency: unconfirmed as of yet  Polymyalgia rheumatica  Liver nodule  Deep venous thrombosis: 2005, 2014 x 2  PE (pulmonary embolism): s/p [place] Filter- 2005  Asthma: well controlled, no recent hospitalizations  HTN (hypertension)  H/O eye surgery: torned  retinal -3 years ago  S/P umbilical hernia repair, follow-up exam  Varicose vein of leg: cauterization left leg vein 1989  History of appendectomy  [place] filter in place  S/P hip replacement: b/l hip replacement  S/P hysterectomy      REVIEW OF SYSTEMS:  CONSTITUTIONAL: no fever, weight loss, or fatigue  EYES: no eye pain, visual disturbances, or discharge  ENMT:  no difficulty hearing, tinnitus, vertigo; no sinus or throat pain  NECK: no pain or stiffness  RESPIRATORY: no cough, wheezing, chills or hemoptysis; no shortness of breath  CARDIOVASCULAR: no chest pain, palpitations, dizziness, or leg swelling  GASTROINTESTINAL: no abdominal or epigastric pain; no nausea, vomiting, or hematemesis; no diarrhea or constipation; no melena or hematochezia.  GENITOURINARY: no dysuria, frequency, hematuria, or incontinence  NEUROLOGICAL: no headaches, memory loss, loss of strength, numbness, or tremors  SKIN: no itching, burning, rashes, or lesions   ENDOCRINE: no heat or cold intolerance; no hair loss  MUSCULOSKELETAL: no muscle or back pain    HOME MEDICATIONS:    · 	acetaminophen 325 mg oral tablet: Last Dose Taken:  , 2 tab(s) orally every 6 hours, As needed, Mild and Moderate Pain  · 	Lyrica 75 mg oral capsule: Last Dose Taken:  , 1 cap(s) orally 2 times a day  · 	losartan-hydrochlorothiazide 100mg-25mg oral tablet: Last Dose Taken:  , 1 tab(s) orally once a day  · 	amLODIPine 5 mg oral tablet: Last Dose Taken:  , 1 tab(s) orally once a day  · 	Coumadin 2.5 mg oral tablet: Last Dose Taken:  , Monday, Thrusday: 3 tablets (7.5mg)  	Tues,Wed,Fri,Sat,Sun: 2 tablets (5mg)  · 	Advair Diskus 100 mcg-50 mcg inhalation powder: Last Dose Taken:  , 1 puff(s) inhaled 2 times a day  · 	Ventolin HFA 90 mcg/inh inhalation aerosol: 2 puff(s) inhaled 4 times a day, As Needed    ALLERGIES and INTOLERANCES:  Iodides (Short breath)    SOCIAL HISTORY:  - denies any smoking  - used to drink etoh, stopped 2005    FAMILY HISTORY:  Family history of MI (myocardial infarction) (Father)      PHYSICAL EXAM:  Vital Signs Last 24 Hrs  T(C): 36.7 (07 Sep 2017 19:44), Max: 36.7 (07 Sep 2017 19:44)  T(F): 98.1 (07 Sep 2017 19:44), Max: 98.1 (07 Sep 2017 19:44)  HR: 67 (07 Sep 2017 20:14) (67 - 78)  BP: 121/64 (07 Sep 2017 20:14) (104/77 - 147/72)  BP(mean): --  RR: 12 (07 Sep 2017 20:14) (10 - 16)  SpO2: 100% (07 Sep 2017 20:14) (97% - 100%)    GENERAL: NAD, well-groomed, well-developed  HEAD:  atraumatic, normocephalic  EYES: EOMI, PERRLA, conjunctiva and sclera clear  ENMT: no tonsillar erythema, exudates, or enlargement; moist mucous membranes, good dentition, no lesions  NECK: supple, no JVD, normal thyroid  NERVOUS SYSTEM:  alert & oriented X3, good concentration; motor strength 5/5 B/L upper and lower extremities;   CHEST/LUNG: clear to percussion bilaterally; no rales, rhonchi, wheezing, or rubs  HEART: regular rate and rhythm; no murmurs, rubs, or gallops  ABDOMEN: soft, nontender, nondistended; bowel sounds present  EXTREMITIES:  2+ peripheral pulses, no clubbing, cyanosis, or edema  LYMPH: no lymphadenopathy noted  BREAST: no palpable masses, skin - no lesions or retractions or discharges; adnexa - no palpable masses noted   GYN: uterus normal size, adnexa no palpable masses, no CMT, no uterine discharge   SKIN: No rashes or lesions  INCISION:     LABS:                        11.3<L>  5.3   )-----------( 296      ( 06 Sep 2017 08:35 )             33.8<L>                        12.7   5.0   )-----------( 348      ( 21 Aug 2017 11:25 )             40.0                         11.9   5.8   )-----------( 285      ( 26 Jul 2017 14:03 )             35.7     07 Sep 2017 11:15    x      |  x      |  x      ----------------------------<  x      4.2     |  x      |  x          21 Aug 2017 11:25    139    |  101    |  13     ----------------------------<  81     3.4<L>   |  30     |  0.89         Ca    8.7        21 Aug 2017 11:25        PT/INR - ( 07 Sep 2017 11:15 )   PT: 12.0 ;   INR: 1.10          PT/INR - ( 21 Aug 2017 14:15 )   PT: 27.2<H>;   INR: 2.45<H>         PTT - ( 21 Aug 2017 14:15 )  PTT:41.3<H>          RADIOLOGY & ADDITIONAL STUDIES: 71F with protein S deficiency complicated by multiple DVTs, PE s/p IVC filter and is on coumadin, polymyalgia rheumatica, asthma, HTN, beta-thalassemia, osteoarthritis who presents electively for right foot surgery.  Has been experiencing pain from her arthritis.  Patient was cleared by pulmonology, cardiology, and vascular for the surgery.  Currently, patient is sleepy from the anesthesia and pain medication but denies any acute pain.  Denies any numbness in her right foot.  Denies any nausea.      PAST MEDICAL & SURGICAL HISTORY:  Neuropathy: right foot  Spondylisthesis  Thalassemia, beta  Osteoarthritis  Flat feet, bilateral  Foot pain, right  Protein S deficiency: unconfirmed as of yet  Polymyalgia rheumatica  Liver nodule  Deep venous thrombosis: 2005, 2014 x 2  PE (pulmonary embolism): s/p [place] Filter- 2005  Asthma: well controlled, no recent hospitalizations  HTN (hypertension)  H/O eye surgery: torned  retinal -3 years ago  S/P umbilical hernia repair, follow-up exam  Varicose vein of leg: cauterization left leg vein 1989  History of appendectomy  [place] filter in place  S/P hip replacement: b/l hip replacement  S/P hysterectomy      REVIEW OF SYSTEMS:  CONSTITUTIONAL: no fever, weight loss, or fatigue  EYES: no eye pain, visual disturbances, or discharge  ENMT:  no difficulty hearing, tinnitus, vertigo; no sinus or throat pain  NECK: no pain or stiffness  RESPIRATORY: no cough, wheezing, chills or hemoptysis; no shortness of breath  CARDIOVASCULAR: no chest pain, palpitations, dizziness, or leg swelling  GASTROINTESTINAL: no abdominal or epigastric pain; no nausea, vomiting, or hematemesis; no diarrhea or constipation; no melena or hematochezia.  GENITOURINARY: no dysuria, frequency, hematuria, or incontinence  NEUROLOGICAL: no headaches, memory loss, loss of strength, numbness, or tremors  SKIN: no itching, burning, rashes, or lesions   ENDOCRINE: no heat or cold intolerance; no hair loss  MUSCULOSKELETAL: no muscle or back pain    HOME MEDICATIONS:    · 	acetaminophen 325 mg oral tablet: Last Dose Taken:  , 2 tab(s) orally every 6 hours, As needed, Mild and Moderate Pain  · 	Lyrica 75 mg oral capsule: Last Dose Taken:  , 1 cap(s) orally 2 times a day  · 	losartan-hydrochlorothiazide 100mg-25mg oral tablet: Last Dose Taken:  , 1 tab(s) orally once a day  · 	amLODIPine 5 mg oral tablet: Last Dose Taken:  , 1 tab(s) orally once a day  · 	Coumadin 2.5 mg oral tablet: Last Dose Taken:  , Monday, Thrusday: 3 tablets (7.5mg)  	Tues,Wed,Fri,Sat,Sun: 2 tablets (5mg)  · 	Advair Diskus 100 mcg-50 mcg inhalation powder: Last Dose Taken:  , 1 puff(s) inhaled 2 times a day  · 	Ventolin HFA 90 mcg/inh inhalation aerosol: 2 puff(s) inhaled 4 times a day, As Needed    ALLERGIES and INTOLERANCES:  Iodides (Short breath)    SOCIAL HISTORY:  - denies any smoking  - used to drink etoh, stopped 2005    FAMILY HISTORY:  Family history of MI (myocardial infarction) (Father)      PHYSICAL EXAM:  Vital Signs Last 24 Hrs  T(C): 36.7 (07 Sep 2017 19:44), Max: 36.7 (07 Sep 2017 19:44)  T(F): 98.1 (07 Sep 2017 19:44), Max: 98.1 (07 Sep 2017 19:44)  HR: 67 (07 Sep 2017 20:14) (67 - 78)  BP: 121/64 (07 Sep 2017 20:14) (104/77 - 147/72)  BP(mean): --  RR: 12 (07 Sep 2017 20:14) (10 - 16)  SpO2: 100% (07 Sep 2017 20:14) (97% - 100%)    GENERAL: NAD, well-groomed, well-developed  HEAD:  atraumatic, normocephalic  EYES: EOMI, PERRLA, conjunctiva and sclera clear  ENMT: no tonsillar erythema, exudates, or enlargement; moist mucous membranes, good dentition, no lesions  NECK: supple, no JVD, normal thyroid  NERVOUS SYSTEM:  sleepy, motor strength 5/5 B/L upper and lower extremities (left foot ROM limited 2/2 surgey);   CHEST/LUNG: clear to percussion bilaterally; no rales, rhonchi, wheezing, or rubs  HEART: regular rate and rhythm; no murmurs, rubs, or gallops  ABDOMEN: soft, nontender, nondistended; bowel sounds present  EXTREMITIES:  2+ peripheral pulses, no clubbing, cyanosis, or edema, left foot in cast, warm to touch      LABS:                        11.3<L>  5.3   )-----------( 296      ( 06 Sep 2017 08:35 )             33.8<L>                        12.7   5.0   )-----------( 348      ( 21 Aug 2017 11:25 )             40.0                         11.9   5.8   )-----------( 285      ( 26 Jul 2017 14:03 )             35.7     07 Sep 2017 11:15    x      |  x      |  x      ----------------------------<  x      4.2     |  x      |  x          21 Aug 2017 11:25    139    |  101    |  13     ----------------------------<  81     3.4<L>   |  30     |  0.89         Ca    8.7        21 Aug 2017 11:25        PT/INR - ( 07 Sep 2017 11:15 )   PT: 12.0 ;   INR: 1.10          PT/INR - ( 21 Aug 2017 14:15 )   PT: 27.2<H>;   INR: 2.45<H>         PTT - ( 21 Aug 2017 14:15 )  PTT:41.3<H>          RADIOLOGY & ADDITIONAL STUDIES: 71F with protein S deficiency complicated by multiple DVTs, PE s/p IVC filter and is on coumadin, polymyalgia rheumatica, asthma, HTN, beta-thalassemia, osteoarthritis who presents electively for right foot surgery.  Has been experiencing pain from her arthritis.  Patient was cleared by pulmonology, cardiology, and vascular for the surgery.  Currently, patient is sleepy from the anesthesia and pain medication but denies any acute pain.  Denies any numbness in her right foot.  Denies any nausea.      PAST MEDICAL & SURGICAL HISTORY:  Neuropathy: right foot  Spondylisthesis  Thalassemia, beta  Osteoarthritis  Flat feet, bilateral  Foot pain, right  Protein S deficiency: unconfirmed as of yet  Polymyalgia rheumatica  Liver nodule  Deep venous thrombosis: 2005, 2014 x 2  PE (pulmonary embolism): s/p [place] Filter- 2005  Asthma: well controlled, no recent hospitalizations  HTN (hypertension)  H/O eye surgery: torned  retinal -3 years ago  S/P umbilical hernia repair, follow-up exam  Varicose vein of leg: cauterization left leg vein 1989  History of appendectomy  [place] filter in place  S/P hip replacement: b/l hip replacement  S/P hysterectomy      REVIEW OF SYSTEMS:  CONSTITUTIONAL: no fever, weight loss, or fatigue  EYES: no eye pain, visual disturbances, or discharge  ENMT:  no difficulty hearing, tinnitus, vertigo; no sinus or throat pain  NECK: no pain or stiffness  RESPIRATORY: no cough, wheezing, chills or hemoptysis; no shortness of breath  CARDIOVASCULAR: no chest pain, palpitations, dizziness, or leg swelling  GASTROINTESTINAL: no abdominal or epigastric pain; no nausea, vomiting, or hematemesis; no diarrhea or constipation; no melena or hematochezia.  GENITOURINARY: no dysuria, frequency, hematuria, or incontinence  NEUROLOGICAL: no headaches, memory loss, loss of strength, numbness, or tremors  SKIN: no itching, burning, rashes, or lesions   ENDOCRINE: no heat or cold intolerance; no hair loss  MUSCULOSKELETAL: no muscle or back pain    HOME MEDICATIONS:    · 	acetaminophen 325 mg oral tablet: Last Dose Taken:  , 2 tab(s) orally every 6 hours, As needed, Mild and Moderate Pain  · 	Lyrica 75 mg oral capsule: Last Dose Taken:  , 1 cap(s) orally 2 times a day  · 	losartan-hydrochlorothiazide 100mg-25mg oral tablet: Last Dose Taken:  , 1 tab(s) orally once a day  · 	amLODIPine 5 mg oral tablet: Last Dose Taken:  , 1 tab(s) orally once a day  · 	Coumadin 2.5 mg oral tablet: Last Dose Taken:  , Monday, Thrusday: 3 tablets (7.5mg)  	Tues,Wed,Fri,Sat,Sun: 2 tablets (5mg)  · 	Advair Diskus 100 mcg-50 mcg inhalation powder: Last Dose Taken:  , 1 puff(s) inhaled 2 times a day  · 	Ventolin HFA 90 mcg/inh inhalation aerosol: 2 puff(s) inhaled 4 times a day, As Needed    ALLERGIES and INTOLERANCES:  Iodides (Short breath)    SOCIAL HISTORY:  - denies any smoking  - used to drink etoh, stopped 2005    FAMILY HISTORY:  Family history of MI (myocardial infarction) (Father)      PHYSICAL EXAM:  Vital Signs Last 24 Hrs  T(C): 36.7 (07 Sep 2017 19:44), Max: 36.7 (07 Sep 2017 19:44)  T(F): 98.1 (07 Sep 2017 19:44), Max: 98.1 (07 Sep 2017 19:44)  HR: 67 (07 Sep 2017 20:14) (67 - 78)  BP: 121/64 (07 Sep 2017 20:14) (104/77 - 147/72)  BP(mean): --  RR: 12 (07 Sep 2017 20:14) (10 - 16)  SpO2: 100% (07 Sep 2017 20:14) (97% - 100%)    GENERAL: NAD, well-groomed, well-developed  HEAD:  atraumatic, normocephalic  EYES: EOMI, PERRLA, conjunctiva and sclera clear  ENMT: no tonsillar erythema, exudates, or enlargement; moist mucous membranes, good dentition, no lesions  NECK: supple, no JVD, normal thyroid  NERVOUS SYSTEM:  sleepy, motor strength 5/5 B/L upper and lower extremities (left foot ROM limited 2/2 surgey);   CHEST/LUNG: clear to percussion bilaterally; no rales, rhonchi, wheezing, or rubs  HEART: regular rate and rhythm; no murmurs, rubs, or gallops  ABDOMEN: soft, nontender, nondistended; bowel sounds present  EXTREMITIES:  2+ peripheral pulses, no clubbing, cyanosis, or edema, left foot in cast, warm to touch      LABS:                        11.3<L>  5.3   )-----------( 296      ( 06 Sep 2017 08:35 )             33.8<L>                        12.7   5.0   )-----------( 348      ( 21 Aug 2017 11:25 )             40.0                         11.9   5.8   )-----------( 285      ( 26 Jul 2017 14:03 )             35.7     07 Sep 2017 11:15    x      |  x      |  x      ----------------------------<  x      4.2     |  x      |  x          21 Aug 2017 11:25    139    |  101    |  13     ----------------------------<  81     3.4<L>   |  30     |  0.89         Ca    8.7        21 Aug 2017 11:25        PT/INR - ( 07 Sep 2017 11:15 )   PT: 12.0 ;   INR: 1.10          PT/INR - ( 21 Aug 2017 14:15 )   PT: 27.2<H>;   INR: 2.45<H>         PTT - ( 21 Aug 2017 14:15 )  PTT:41.3<H>    TTE:  mild MR and TR, minimal AR, borderline pHTN  EKG:  NSR with 1st degree AV block

## 2017-09-07 NOTE — CONSULT NOTE ADULT - PROBLEM SELECTOR RECOMMENDATION 9
- pain control as per ortho/anesthesia, PT/OT, advance diet as tolerated, anti-emetics PRN  - cont with lyrica

## 2017-09-07 NOTE — BRIEF OPERATIVE NOTE - PROCEDURE
Triple arthrodesis of right hindfoot with fixation  09/07/2017    Active  OFELIA Triple arthrodesis of right hindfoot with fixation  09/07/2017    Active  Dorian Espinoza

## 2017-09-08 LAB
ANION GAP SERPL CALC-SCNC: 9 MMOL/L — SIGNIFICANT CHANGE UP (ref 5–17)
APTT BLD: 31.6 SEC — SIGNIFICANT CHANGE UP (ref 27.5–37.4)
BASOPHILS # BLD AUTO: 0 K/UL — SIGNIFICANT CHANGE UP (ref 0–0.2)
BASOPHILS NFR BLD AUTO: 0.3 % — SIGNIFICANT CHANGE UP (ref 0–2)
BUN SERPL-MCNC: 17 MG/DL — SIGNIFICANT CHANGE UP (ref 7–23)
CALCIUM SERPL-MCNC: 8.3 MG/DL — LOW (ref 8.4–10.5)
CHLORIDE SERPL-SCNC: 102 MMOL/L — SIGNIFICANT CHANGE UP (ref 96–108)
CO2 SERPL-SCNC: 25 MMOL/L — SIGNIFICANT CHANGE UP (ref 22–31)
CREAT SERPL-MCNC: 0.7 MG/DL — SIGNIFICANT CHANGE UP (ref 0.5–1.3)
EOSINOPHIL # BLD AUTO: 0 K/UL — SIGNIFICANT CHANGE UP (ref 0–0.5)
EOSINOPHIL NFR BLD AUTO: 0 % — SIGNIFICANT CHANGE UP (ref 0–6)
GLUCOSE SERPL-MCNC: 129 MG/DL — HIGH (ref 70–99)
HCT VFR BLD CALC: 32.3 % — LOW (ref 34.5–45)
HGB BLD-MCNC: 9.9 G/DL — LOW (ref 11.5–15.5)
INR BLD: 1.1 RATIO — SIGNIFICANT CHANGE UP (ref 0.88–1.16)
LYMPHOCYTES # BLD AUTO: 1 K/UL — SIGNIFICANT CHANGE UP (ref 1–3.3)
LYMPHOCYTES # BLD AUTO: 9.9 % — LOW (ref 13–44)
MCHC RBC-ENTMCNC: 22.8 PG — LOW (ref 27–34)
MCHC RBC-ENTMCNC: 30.6 GM/DL — LOW (ref 32–36)
MCV RBC AUTO: 74.2 FL — LOW (ref 80–100)
MONOCYTES # BLD AUTO: 0.9 K/UL — SIGNIFICANT CHANGE UP (ref 0–0.9)
MONOCYTES NFR BLD AUTO: 8.4 % — SIGNIFICANT CHANGE UP (ref 2–14)
NEUTROPHILS # BLD AUTO: 8.3 K/UL — HIGH (ref 1.8–7.4)
NEUTROPHILS NFR BLD AUTO: 81.4 % — HIGH (ref 43–77)
PLATELET # BLD AUTO: 258 K/UL — SIGNIFICANT CHANGE UP (ref 150–400)
POTASSIUM SERPL-MCNC: 4 MMOL/L — SIGNIFICANT CHANGE UP (ref 3.5–5.3)
POTASSIUM SERPL-SCNC: 4 MMOL/L — SIGNIFICANT CHANGE UP (ref 3.5–5.3)
PROTHROM AB SERPL-ACNC: 12 SEC — SIGNIFICANT CHANGE UP (ref 9.8–12.7)
RBC # BLD: 4.35 M/UL — SIGNIFICANT CHANGE UP (ref 3.8–5.2)
RBC # FLD: 14.6 % — HIGH (ref 10.3–14.5)
SODIUM SERPL-SCNC: 136 MMOL/L — SIGNIFICANT CHANGE UP (ref 135–145)
WBC # BLD: 10.2 K/UL — SIGNIFICANT CHANGE UP (ref 3.8–10.5)
WBC # FLD AUTO: 10.2 K/UL — SIGNIFICANT CHANGE UP (ref 3.8–10.5)

## 2017-09-08 PROCEDURE — 99233 SBSQ HOSP IP/OBS HIGH 50: CPT

## 2017-09-08 RX ORDER — OXYCODONE HYDROCHLORIDE 5 MG/1
5 TABLET ORAL
Qty: 0 | Refills: 0 | Status: DISCONTINUED | OUTPATIENT
Start: 2017-09-08 | End: 2017-09-09

## 2017-09-08 RX ORDER — OXYCODONE HYDROCHLORIDE 5 MG/1
10 TABLET ORAL
Qty: 0 | Refills: 0 | Status: DISCONTINUED | OUTPATIENT
Start: 2017-09-08 | End: 2017-09-09

## 2017-09-08 RX ORDER — WARFARIN SODIUM 2.5 MG/1
5 TABLET ORAL ONCE
Qty: 0 | Refills: 0 | Status: COMPLETED | OUTPATIENT
Start: 2017-09-08 | End: 2017-09-08

## 2017-09-08 RX ORDER — HYDROMORPHONE HYDROCHLORIDE 2 MG/ML
0.5 INJECTION INTRAMUSCULAR; INTRAVENOUS; SUBCUTANEOUS
Qty: 0 | Refills: 0 | Status: DISCONTINUED | OUTPATIENT
Start: 2017-09-08 | End: 2017-09-09

## 2017-09-08 RX ORDER — OXYCODONE HYDROCHLORIDE 5 MG/1
10 TABLET ORAL ONCE
Qty: 0 | Refills: 0 | Status: DISCONTINUED | OUTPATIENT
Start: 2017-09-08 | End: 2017-09-08

## 2017-09-08 RX ORDER — BENZOCAINE AND MENTHOL 5; 1 G/100ML; G/100ML
1 LIQUID ORAL EVERY 6 HOURS
Qty: 0 | Refills: 0 | Status: DISCONTINUED | OUTPATIENT
Start: 2017-09-08 | End: 2017-09-09

## 2017-09-08 RX ADMIN — AMLODIPINE BESYLATE 5 MILLIGRAM(S): 2.5 TABLET ORAL at 06:33

## 2017-09-08 RX ADMIN — Medication 1000 MILLIGRAM(S): at 19:06

## 2017-09-08 RX ADMIN — Medication 100 MILLIGRAM(S): at 20:34

## 2017-09-08 RX ADMIN — OXYCODONE HYDROCHLORIDE 10 MILLIGRAM(S): 5 TABLET ORAL at 19:16

## 2017-09-08 RX ADMIN — Medication 1000 MILLIGRAM(S): at 06:50

## 2017-09-08 RX ADMIN — Medication 100 MILLIGRAM(S): at 06:33

## 2017-09-08 RX ADMIN — OXYCODONE HYDROCHLORIDE 10 MILLIGRAM(S): 5 TABLET ORAL at 06:50

## 2017-09-08 RX ADMIN — Medication 1000 MILLIGRAM(S): at 01:39

## 2017-09-08 RX ADMIN — OXYCODONE HYDROCHLORIDE 10 MILLIGRAM(S): 5 TABLET ORAL at 19:46

## 2017-09-08 RX ADMIN — OXYCODONE HYDROCHLORIDE 10 MILLIGRAM(S): 5 TABLET ORAL at 10:46

## 2017-09-08 RX ADMIN — Medication 75 MILLIGRAM(S): at 06:33

## 2017-09-08 RX ADMIN — Medication 400 MILLIGRAM(S): at 01:20

## 2017-09-08 RX ADMIN — ENOXAPARIN SODIUM 110 MILLIGRAM(S): 100 INJECTION SUBCUTANEOUS at 09:08

## 2017-09-08 RX ADMIN — BUDESONIDE AND FORMOTEROL FUMARATE DIHYDRATE 2 PUFF(S): 160; 4.5 AEROSOL RESPIRATORY (INHALATION) at 06:33

## 2017-09-08 RX ADMIN — WARFARIN SODIUM 5 MILLIGRAM(S): 2.5 TABLET ORAL at 21:45

## 2017-09-08 RX ADMIN — ENOXAPARIN SODIUM 110 MILLIGRAM(S): 100 INJECTION SUBCUTANEOUS at 20:33

## 2017-09-08 RX ADMIN — Medication 1000 MILLIGRAM(S): at 19:36

## 2017-09-08 RX ADMIN — Medication 400 MILLIGRAM(S): at 06:33

## 2017-09-08 RX ADMIN — OXYCODONE HYDROCHLORIDE 10 MILLIGRAM(S): 5 TABLET ORAL at 13:52

## 2017-09-08 RX ADMIN — OXYCODONE HYDROCHLORIDE 10 MILLIGRAM(S): 5 TABLET ORAL at 16:53

## 2017-09-08 RX ADMIN — BENZOCAINE AND MENTHOL 1 LOZENGE: 5; 1 LIQUID ORAL at 21:28

## 2017-09-08 RX ADMIN — Medication 100 MILLIGRAM(S): at 09:10

## 2017-09-08 RX ADMIN — OXYCODONE HYDROCHLORIDE 10 MILLIGRAM(S): 5 TABLET ORAL at 21:45

## 2017-09-08 RX ADMIN — Medication 1000 MILLIGRAM(S): at 14:16

## 2017-09-08 RX ADMIN — BUDESONIDE AND FORMOTEROL FUMARATE DIHYDRATE 2 PUFF(S): 160; 4.5 AEROSOL RESPIRATORY (INHALATION) at 19:06

## 2017-09-08 RX ADMIN — Medication 75 MILLIGRAM(S): at 16:51

## 2017-09-08 RX ADMIN — OXYCODONE HYDROCHLORIDE 10 MILLIGRAM(S): 5 TABLET ORAL at 14:22

## 2017-09-08 RX ADMIN — Medication 100 MILLIGRAM(S): at 01:21

## 2017-09-08 RX ADMIN — OXYCODONE HYDROCHLORIDE 10 MILLIGRAM(S): 5 TABLET ORAL at 11:16

## 2017-09-08 RX ADMIN — Medication 100 MILLIGRAM(S): at 13:47

## 2017-09-08 RX ADMIN — OXYCODONE HYDROCHLORIDE 10 MILLIGRAM(S): 5 TABLET ORAL at 17:23

## 2017-09-08 RX ADMIN — OXYCODONE HYDROCHLORIDE 10 MILLIGRAM(S): 5 TABLET ORAL at 22:15

## 2017-09-08 RX ADMIN — Medication 400 MILLIGRAM(S): at 13:46

## 2017-09-08 RX ADMIN — OXYCODONE HYDROCHLORIDE 10 MILLIGRAM(S): 5 TABLET ORAL at 07:20

## 2017-09-08 NOTE — OCCUPATIONAL THERAPY INITIAL EVALUATION ADULT - ADDITIONAL COMMENTS
Pt lives in an apt with 1 step + ramp to enter apt bldg. +elevator inside bldg to access apt. No steps inside apt. Pt has a bathtub with glass doors. Pt reports that she owns a wheeled walker/rollator that she has been using to ambulate outdoors and for community mobility for over a year. Pt also owns a cane that she ambulated with indoors.

## 2017-09-08 NOTE — OCCUPATIONAL THERAPY INITIAL EVALUATION ADULT - PERTINENT HX OF CURRENT PROBLEM, REHAB EVAL
72 y/o female s/p Right foot arthrodesis 9/07/17 by Dr. Do. Pt reports that she has a lot of pain in her right knee and that she needs a knee replacement. Pt has hx polymyalgia rheumatica.

## 2017-09-09 ENCOUNTER — TRANSCRIPTION ENCOUNTER (OUTPATIENT)
Age: 72
End: 2017-09-09

## 2017-09-09 VITALS
SYSTOLIC BLOOD PRESSURE: 114 MMHG | DIASTOLIC BLOOD PRESSURE: 68 MMHG | RESPIRATION RATE: 16 BRPM | OXYGEN SATURATION: 97 % | HEART RATE: 74 BPM | TEMPERATURE: 97 F

## 2017-09-09 PROCEDURE — 99238 HOSP IP/OBS DSCHRG MGMT 30/<: CPT

## 2017-09-09 RX ORDER — ACETAMINOPHEN 500 MG
2 TABLET ORAL
Qty: 0 | Refills: 0 | COMMUNITY
Start: 2017-09-09

## 2017-09-09 RX ORDER — OXYCODONE HYDROCHLORIDE 5 MG/1
1 TABLET ORAL
Qty: 0 | Refills: 0 | COMMUNITY
Start: 2017-09-09

## 2017-09-09 RX ORDER — MAGNESIUM HYDROXIDE 400 MG/1
30 TABLET, CHEWABLE ORAL
Qty: 0 | Refills: 0 | COMMUNITY
Start: 2017-09-09

## 2017-09-09 RX ORDER — DOCUSATE SODIUM 100 MG
1 CAPSULE ORAL
Qty: 0 | Refills: 0 | COMMUNITY
Start: 2017-09-09

## 2017-09-09 RX ADMIN — Medication 1000 MILLIGRAM(S): at 07:00

## 2017-09-09 RX ADMIN — BENZOCAINE AND MENTHOL 1 LOZENGE: 5; 1 LIQUID ORAL at 06:35

## 2017-09-09 RX ADMIN — OXYCODONE HYDROCHLORIDE 10 MILLIGRAM(S): 5 TABLET ORAL at 06:15

## 2017-09-09 RX ADMIN — Medication 100 MILLIGRAM(S): at 06:00

## 2017-09-09 RX ADMIN — Medication 75 MILLIGRAM(S): at 05:30

## 2017-09-09 RX ADMIN — OXYCODONE HYDROCHLORIDE 10 MILLIGRAM(S): 5 TABLET ORAL at 05:30

## 2017-09-09 RX ADMIN — HYDROMORPHONE HYDROCHLORIDE 0.5 MILLIGRAM(S): 2 INJECTION INTRAMUSCULAR; INTRAVENOUS; SUBCUTANEOUS at 06:35

## 2017-09-09 RX ADMIN — HYDROMORPHONE HYDROCHLORIDE 0.5 MILLIGRAM(S): 2 INJECTION INTRAMUSCULAR; INTRAVENOUS; SUBCUTANEOUS at 06:50

## 2017-09-09 RX ADMIN — AMLODIPINE BESYLATE 5 MILLIGRAM(S): 2.5 TABLET ORAL at 05:30

## 2017-09-09 RX ADMIN — Medication 1000 MILLIGRAM(S): at 08:00

## 2017-09-09 RX ADMIN — ENOXAPARIN SODIUM 110 MILLIGRAM(S): 100 INJECTION SUBCUTANEOUS at 09:00

## 2017-09-09 RX ADMIN — BUDESONIDE AND FORMOTEROL FUMARATE DIHYDRATE 2 PUFF(S): 160; 4.5 AEROSOL RESPIRATORY (INHALATION) at 07:00

## 2017-09-09 NOTE — PROGRESS NOTE ADULT - PROBLEM SELECTOR PLAN 1
Pain Management: acceptable- continue current care Oxycodone PRN  Continue PT/OT  DVT proph: see below  DC plan:  [  ] Home with HC  [ x ] Rehab   [  ] TBD  [  ]other:___________
Pain Management: acceptable- continue current care Tylenol ATC/Celebrex ATC/ Oxycodone PRN  Continue PT/OT  DVT proph: see below  DC plan:  [  ] Home with HC  [ x ] Rehab   [  ] TBD  [  ]other:___________

## 2017-09-09 NOTE — DISCHARGE NOTE ADULT - CARE PLAN
Principal Discharge DX:	Foot pain, right  Goal:	pain free s/p arthrodesis  Instructions for follow-up, activity and diet:	normal diet Principal Discharge DX:	Foot pain, right  Goal:	pain free s/p arthrodesis  Instructions for follow-up, activity and diet:	normal diet  Goal:	protein S deficiency, cont Lovenox until INR is therapeutic, monitor daily INR.

## 2017-09-09 NOTE — PROGRESS NOTE ADULT - ASSESSMENT
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

## 2017-09-09 NOTE — DISCHARGE NOTE ADULT - PLAN OF CARE
pain free s/p arthrodesis normal diet protein S deficiency, cont Lovenox until INR is therapeutic, monitor daily INR.

## 2017-09-09 NOTE — PROGRESS NOTE ADULT - PROBLEM SELECTOR PLAN 2
s/p multiple thrombosis in the past.  patient high risk for recurrence.  Full dose Lovenox to coumadin bridge.
s/p multiple thrombosis in the past.  patient high risk for recurrence.  Full dose Lovenox to coumadin bridge.

## 2017-09-09 NOTE — PROGRESS NOTE ADULT - SUBJECTIVE AND OBJECTIVE BOX
ORTHOPEDIC PA PROGRESS NOTE  JAIDA THACKER      71y Female                                                                                                                               POD #    2    STATUS POST:               Pre-Op Dx: Primary osteoarthritis of right ankle or foot    Post-Op Dx:  Primary osteoarthritis of right ankle or foot    Procedure: Triple arthrodesis of right hindfoot with fixation                                                  Current Pain Management:  Po Analgesics     T(F): 98  HR: 69  BP: 99/61  RR: 16  SpO2: 96%                        9.9    10.2  )-----------( 258      ( 08 Sep 2017 07:58 )             32.3                     09-08    136  |  102  |  17  ----------------------------<  129<H>  4.0   |  25  |  0.70    Ca    8.3<L>      08 Sep 2017 07:58      Physical Exam :    -   Dressing changed sterile.   -   Wound C/D/I.   -   Distal Neurvascular status intact grossly.   -   Warm well perfused; capillary refill <3 seconds   -   (+)EHL/FHL 5/5  -   (+) Sensation to light touch  -   (-) Calf tenderness Bilaterally    A/P: 71y Female s/p Primary osteoarthritis of right ankle or foot    -   Ortho Stable  -   Pain control   -   Medicine to follow  -   DVT ppx:      Lovenox and coumadin  -   Weight bearing status:    NWB  right foot   -  Dispo:      PARMINDER today
Patient is a 71y old  Female who presents with a chief complaint of right foot arthrodesis (09 Sep 2017 11:16)      INTERVAL HPI/OVERNIGHT EVENTS: pt fees better, pain controlled,    Pain Location & Control: controlled.    MEDICATIONS  (STANDING):  lactated ringers. 1000 milliLiter(s) (100 mL/Hr) IV Continuous <Continuous>  docusate sodium 100 milliGRAM(s) Oral three times a day  pregabalin 75 milliGRAM(s) Oral two times a day  amLODIPine   Tablet 5 milliGRAM(s) Oral daily  buDESOnide  80 MICROgram(s)/formoterol 4.5 MICROgram(s) Inhaler 2 Puff(s) Inhalation two times a day  enoxaparin Injectable 110 milliGRAM(s) SubCutaneous every 12 hours  acetaminophen   Tablet. 1000 milliGRAM(s) Oral every 12 hours    MEDICATIONS  (PRN):  aluminum hydroxide/magnesium hydroxide/simethicone Suspension 30 milliLiter(s) Oral four times a day PRN Indigestion  ondansetron Injectable 4 milliGRAM(s) IV Push every 6 hours PRN Nausea and/or Vomiting  magnesium hydroxide Suspension 30 milliLiter(s) Oral daily PRN Constipation  ALBUTerol    90 MICROgram(s) HFA Inhaler 2 Puff(s) Inhalation every 6 hours PRN Shortness of Breath and/or Wheezing  oxyCODONE    IR 5 milliGRAM(s) Oral every 3 hours PRN Mild Pain  oxyCODONE    IR 10 milliGRAM(s) Oral every 3 hours PRN Moderate Pain  HYDROmorphone  Injectable 0.5 milliGRAM(s) IV Push every 3 hours PRN Severe Pain  benzocaine 15 mG/menthol 3.6 mG Lozenge 1 Lozenge Oral every 6 hours PRN Sore Throat      Allergies    Iodides (Short breath)    Intolerances        REVIEW OF SYSTEMS:  CONSTITUTIONAL: No fever, weight loss, or fatigue  EYES: No eye pain, visual disturbances, or discharge  ENMT:  No difficulty hearing, tinnitus, vertigo; No sinus or throat pain  NECK: No pain or stiffness  BREASTS: No pain, masses, or nipple discharge  RESPIRATORY: No cough, wheezing, chills or hemoptysis; No shortness of breath  CARDIOVASCULAR: No chest pain, palpitations, dizziness, or leg swelling  GASTROINTESTINAL: No abdominal or epigastric pain. No nausea, vomiting, or hematemesis; No diarrhea or constipation. No melena or hematochezia.  GENITOURINARY: No dysuria, frequency, hematuria, or incontinence  NEUROLOGICAL: No headaches, memory loss, loss of strength, numbness, or tremors  SKIN: No itching, burning, rashes, or lesions   LYMPH NODES: No enlarged glands  ENDOCRINE: No heat or cold intolerance; No hair loss; No polydipsia or polyuria  MUSCULOSKELETAL: No back pain  PSYCHIATRIC: No depression, anxiety, mood swings, or difficulty sleeping  HEME/LYMPH: No easy bruising, or bleeding gums  ALLERGY AND IMMUNOLOGIC: No hives or eczema    Vital Signs Last 24 Hrs  T(C): 36.3 (09 Sep 2017 12:08), Max: 36.7 (09 Sep 2017 07:06)  T(F): 97.3 (09 Sep 2017 12:08), Max: 98 (09 Sep 2017 07:06)  HR: 74 (09 Sep 2017 12:08) (69 - 74)  BP: 114/68 (09 Sep 2017 12:08) (99/61 - 114/68)  BP(mean): --  RR: 16 (09 Sep 2017 12:08) (16 - 18)  SpO2: 97% (09 Sep 2017 12:08) (96% - 99%)    PHYSICAL EXAM:  GENERAL: NAD, well-groomed, well-developed  HEAD:  Atraumatic, Normocephalic  EYES: EOMI, PERRLA, conjunctiva and sclera clear  ENMT: No tonsillar erythema, exudates, or enlargement; Moist mucous membranes, Good dentition, No lesions  NECK: Supple, No JVD, Normal thyroid  NERVOUS SYSTEM:  Alert & Oriented X3, Good concentration; Motor Strength 5/5 B/L upper and lower extremities; DTRs 2+ intact and symmetric  CHEST/LUNG: Clear to auscultation bilaterally; No rales, rhonchi, wheezing, or rubs  HEART: Regular rate and rhythm; No murmurs, rubs, or gallops  ABDOMEN: Soft, Nontender, Nondistended; Bowel sounds present  EXTREMITIES:  2+ Peripheral Pulses, No clubbing or cyanosis  LYMPH: No lymphadenopathy noted  SKIN: No rashes or lesions  INCISION:  Dressing dry and intact    LABS:      Ca    8.3        08 Sep 2017 07:58      PT/INR - ( 08 Sep 2017 07:58 )   PT: 12.0 sec;   INR: 1.10 ratio         PTT - ( 08 Sep 2017 07:58 )  PTT:31.6 sec    CAPILLARY BLOOD GLUCOSE          RADIOLOGY & ADDITIONAL TESTS:    Imaging Personally Reviewed:  [ ] YES  [ ] NO    Consultant(s) Notes Reviewed:  [x ] YES  [ ] NO    Care Discussed with Consultants/Other Providers [x ] YES  [ ] NO
Procedure: RIGht foot.  triple Arthrodesis  POD#: 1    S: Pt without complaints. No SOB,CP, N/V. Tolerated Diet well.   Pain comfortable (3/10 ) on  Interval Rx  No BM yet, + flatus, No abdominal pain.  Pain Rx:   pregabalin 75 milliGRAM(s) Oral two times a day  acetaminophen  IVPB. 1000 milliGRAM(s) IV Intermittent every 6 hours  acetaminophen   Tablet. 1000 milliGRAM(s) Oral every 12 hours  oxyCODONE    IR 5 milliGRAM(s) Oral every 3 hours PRN  oxyCODONE    IR 10 milliGRAM(s) Oral every 3 hours PRN  HYDROmorphone  Injectable 0.5 milliGRAM(s) IV Push every 3 hours PRN    O: General: Pt Alert and oriented, On exam NAD,   VS: Vital Signs Last 24 Hrs  T(C): 36.4 (08 Sep 2017 07:17), Max: 36.7 (07 Sep 2017 19:44)  T(F): 97.5 (08 Sep 2017 07:17), Max: 98.1 (07 Sep 2017 19:44)  HR: 67 (08 Sep 2017 07:17) (62 - 78)  BP: 135/83 (08 Sep 2017 07:17) (104/77 - 147/72)  RR: 16 (08 Sep 2017 07:17) (10 - 16)  SpO2: 97% (08 Sep 2017 07:17) (97% - 100%)  Lungs: BS clear bilat.    Ext( Right Ankle): [place] Dressing & AO splint clean, dry, & intact, No constricting at Prox & Distal Ends  Neurologic: Has sensation bilat. feet & toes ;  Full AROM bilat feet & toes. EHL = Bilat: 5/5; Can't test AT on Right.  Vascular: Feet toes warm, pink. DP = 2+. Calves soft ; w/o tenderness bilat..  VTEP: On Bilat. Venodynes + enoxaparin Injectable 110 milliGRAM(s) SubCutaneous every 12 hours + Warfarin     Labs                     9.9    10.2  )-----------( 258      ( 08 Sep 2017 07:58 )             32.3     09-08    136  |  102  |  17  ----------------------------<  129<H>  4.0   |  25  |  0.70    PT/INR = 12/1.1      08 Sep 2017 07:58    Hospitalist input noted    Primary Orthopedic Assessment:  • Stable from Orthopedic perspective  • Neuro motor exam stable; Pop Block emerged  • Labs:  CBC/ CHem stable      Plan:   • Continue:  PT/OT/Non Weightbearing Right as tolerated with assistance of a walker/Ice to ankle/          Incentive spirometry encouraged /   • Continue DVT prophylaxis as prescribed, including use of compression devices and ankle pumps  • Continue Pain Rx  • Plans per Medicine / Anesthesia   • Discharge planning – anticipated discharge is:  Subacute rehabilitation die to poor level of mobility with NWB when medically stable & cleared by PT/OT
Patient is a 71y old  Female who presents with a chief complaint of bilateral leg pain (06 Sep 2017 16:21)      INTERVAL HPI/OVERNIGHT EVENTS: Feeling ok.  pain in right foot is present but controlled.  She is otherwise feeling well.  no new complains.  no nausea/CP/SOB.    MEDICATIONS  (STANDING):  lactated ringers. 1000 milliLiter(s) (100 mL/Hr) IV Continuous <Continuous>  docusate sodium 100 milliGRAM(s) Oral three times a day  pregabalin 75 milliGRAM(s) Oral two times a day  amLODIPine   Tablet 5 milliGRAM(s) Oral daily  buDESOnide  80 MICROgram(s)/formoterol 4.5 MICROgram(s) Inhaler 2 Puff(s) Inhalation two times a day  enoxaparin Injectable 110 milliGRAM(s) SubCutaneous every 12 hours  acetaminophen  IVPB. 1000 milliGRAM(s) IV Intermittent every 6 hours  acetaminophen   Tablet. 1000 milliGRAM(s) Oral every 12 hours  warfarin 5 milliGRAM(s) Oral once    MEDICATIONS  (PRN):  aluminum hydroxide/magnesium hydroxide/simethicone Suspension 30 milliLiter(s) Oral four times a day PRN Indigestion  ondansetron Injectable 4 milliGRAM(s) IV Push every 6 hours PRN Nausea and/or Vomiting  magnesium hydroxide Suspension 30 milliLiter(s) Oral daily PRN Constipation  ALBUTerol    90 MICROgram(s) HFA Inhaler 2 Puff(s) Inhalation every 6 hours PRN Shortness of Breath and/or Wheezing  oxyCODONE    IR 5 milliGRAM(s) Oral every 3 hours PRN Mild Pain  oxyCODONE    IR 10 milliGRAM(s) Oral every 3 hours PRN Moderate Pain  HYDROmorphone  Injectable 0.5 milliGRAM(s) IV Push every 3 hours PRN Severe Pain      Allergies  Iodides (Short breath)      REVIEW OF SYSTEMS:  CONSTITUTIONAL: No fever, weight loss, or fatigue  EYES: No eye pain, visual disturbances, or discharge  ENMT:  No difficulty hearing, tinnitus, vertigo; No sinus or throat pain  NECK: No pain or stiffness  BREASTS: No pain, masses, or nipple discharge  RESPIRATORY: No cough, wheezing, chills or hemoptysis; No shortness of breath  CARDIOVASCULAR: No chest pain, palpitations, or lightheadedness  GASTROINTESTINAL: No abdominal or epigastric pain. No nausea, vomiting, or hematemesis; No diarrhea or constipation. No melena or hematochezia.  GENITOURINARY: No dysuria, frequency, hematuria, or incontinence  NEUROLOGICAL: No headaches, vertigo, memory loss, loss of strength, numbness, or tremors  SKIN: No itching, burning, rashes, or lesions   LYMPH NODES: No enlarged glands  ENDOCRINE: No heat or cold intolerance; No hair loss; No polydipsia or polyuria  MUSCULOSKELETAL: No back pain  PSYCHIATRIC: No depression, anxiety, or mood swings  HEME/LYMPH: No easy bruising, or bleeding gums  ALLERGY AND IMMUNOLOGIC: No hives or eczema    Vital Signs Last 24 Hrs  T(C): 36.4 (08 Sep 2017 11:13), Max: 36.7 (07 Sep 2017 19:44)  T(F): 97.5 (08 Sep 2017 11:13), Max: 98.1 (07 Sep 2017 19:44)  HR: 80 (08 Sep 2017 11:13) (62 - 80)  BP: 107/70 (08 Sep 2017 11:13) (104/77 - 144/66)  BP(mean): --  RR: 19 (08 Sep 2017 11:13) (10 - 19)  SpO2: 100% (08 Sep 2017 11:13) (97% - 100%)    PHYSICAL EXAM:  GENERAL: NAD, well-groomed, well-developed  HEAD:  Atraumatic, Normocephalic  EYES: conjunctiva and sclera clear  ENMT: Moist mucous membranes  NECK: Supple  NERVOUS SYSTEM:  Alert & Oriented X3, Good concentration; Bilateral LE mobile, sensation to light touch intact  CHEST/LUNG: Clear to auscultation bilaterally; No rales, rhonchi, wheezing, or rubs  HEART: Regular rate and rhythm; No murmurs, rubs, or gallops  ABDOMEN: Soft, Nontender, Nondistended; Bowel sounds present  EXTREMITIES:  2+ Peripheral Pulse on left leg, No clubbing or cyanosis, toes on right leg cap refill<1 sec,   LYMPH: No lymphadenopathy noted  SKIN: No rashes or lesions  INCISION:  splint on right foot    LABS:                        9.9    10.2  )-----------( 258      ( 08 Sep 2017 07:58 )             32.3     08 Sep 2017 07:58    136    |  102    |  17     ----------------------------<  129    4.0     |  25     |  0.70     Ca    8.3        08 Sep 2017 07:58      PT/INR - ( 08 Sep 2017 07:58 )   PT: 12.0 sec;   INR: 1.10 ratio         PTT - ( 08 Sep 2017 07:58 )  PTT:31.6 sec        RADIOLOGY & ADDITIONAL TESTS:    Imaging Personally Reviewed:      [ ] Consultant(s) Notes Reviewed  [x] Care Discussed with Consultants/Other Providers:  Ortho PA- plan of care

## 2017-09-09 NOTE — DISCHARGE NOTE ADULT - MEDICATION SUMMARY - MEDICATIONS TO TAKE
I will START or STAY ON the medications listed below when I get home from the hospital:    acetaminophen 500 mg oral tablet  -- 2 tab(s) by mouth every 12 hours  -- Indication: For Pain    oxyCODONE 5 mg oral tablet  -- 1 tab(s) by mouth every 3 hours, As needed, Mild Pain  -- Indication: For Pain    oxyCODONE 10 mg oral tablet  -- 1 tab(s) by mouth every 3 hours, As needed, Moderate Pain  -- Indication: For Pain    aluminum hydroxide-magnesium hydroxide 200 mg-200 mg/5 mL oral suspension  -- 30 milliliter(s) by mouth 4 times a day, As needed, Indigestion  -- Indication: For gerd    magnesium hydroxide 8% oral suspension  -- 30 milliliter(s) by mouth once a day, As needed, Constipation  -- Indication: For gerd    Lovenox 120 mg/0.8 mL injectable solution  -- 1  injectable 2 times a day  -- Indication: For dvt prophylaxis until coumdain is therapeutic    Coumadin 2.5 mg oral tablet  -- Monday, Thrusday: 3 tablets (7.5mg)  Tues,Wed,Fri,Sat,Sun: 2 tablets (5mg)  -- Indication: For dvt proplylaxis and heart protection    Lyrica 75 mg oral capsule  -- 1 cap(s) by mouth 2 times a day  -- Indication: For Pain    losartan-hydrochlorothiazide 100mg-25mg oral tablet  -- 1 tab(s) by mouth once a day  -- Indication: For blood pressure    Advair Diskus 100 mcg-50 mcg inhalation powder  -- 1 puff(s) inhaled 2 times a day  -- Indication: For copd    Ventolin HFA 90 mcg/inh inhalation aerosol  -- 2 puff(s) inhaled 4 times a day, As Needed  -- Indication: For copd    amLODIPine 5 mg oral tablet  -- 1 tab(s) by mouth once a day  -- Indication: For blood pressure    Pepcid AC Maximum Strength 20 mg oral tablet  -- 1 tab(s) by mouth 2 times a day(x2 preoperatively )  -- Indication: For gerd    docusate sodium 100 mg oral capsule  -- 1 cap(s) by mouth 3 times a day  -- Indication: For constipation

## 2017-09-09 NOTE — DISCHARGE NOTE ADULT - HOSPITAL COURSE
This patient was admitted to [facility] with a history of right foot arthritis .  Patient went to Pre-Surgical Testing at [facility] and was medically cleared to undergo elective procedure.  No operative or johnny-operative complications arose during patients hospital course.  Patient received antibiotic according to SCIP guidelines for infection prevention.  coumadin and lovenox was given for DVT prophylaxis.  Anesthesia, Medical Hospitalist, Physical Therapy and Occupational Therapy were consulted. Patient is stable for discharge with a good prognosis.  Appropriate discharge instructions and medications are provided in this document.

## 2017-09-09 NOTE — DISCHARGE NOTE ADULT - MEDICATION SUMMARY - MEDICATIONS TO STOP TAKING
I will STOP taking the medications listed below when I get home from the hospital:    acetaminophen 325 mg oral tablet  -- 2 tab(s) by mouth every 6 hours, As needed, Mild and Moderate Pain    Pepcid AC Maximum Strength 20 mg oral tablet  -- 1 tab(s) by mouth 2 times a day(x2 preoperatively )

## 2017-09-10 ENCOUNTER — TRANSCRIPTION ENCOUNTER (OUTPATIENT)
Age: 72
End: 2017-09-10

## 2017-09-12 ENCOUNTER — APPOINTMENT (OUTPATIENT)
Dept: VASCULAR SURGERY | Facility: CLINIC | Age: 72
End: 2017-09-12

## 2017-09-19 ENCOUNTER — APPOINTMENT (OUTPATIENT)
Dept: ORTHOPEDIC SURGERY | Facility: CLINIC | Age: 72
End: 2017-09-19
Payer: MEDICARE

## 2017-09-19 VITALS — HEIGHT: 62 IN

## 2017-09-19 PROCEDURE — 99024 POSTOP FOLLOW-UP VISIT: CPT

## 2017-09-19 PROCEDURE — 73630 X-RAY EXAM OF FOOT: CPT | Mod: RT

## 2017-09-19 PROCEDURE — 29405 APPL SHORT LEG CAST: CPT | Mod: 58,RT

## 2017-09-19 NOTE — ASU PREOP CHECKLIST - NEEDLE GAUGE
SUBJECTIVE:   Pernell returns today for an annual exam. She would also like to discuss ongoing problems that she is having with her urge incontinence. She currently is a 78 year old,  2 para 2002 who is status post HARRISON/BSO many years ago due to his functional uterine bleeding. 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 This was managed for many years using a pessary, but she underwent a sacrospinous ligament suspension and a rectocele repair in 2016, and is currently doing extremely well.    The patient denies any problems with postmenopausal bleeding, vaginal discharge, odor or dysuria. She continues to have some urge incontinence, but this is well managed with Detrol LA. She does not have any further issues with stress incontinence, and no longer has any problems with pressure or pelvic organ prolapse. This was a marked problem in the past, but did respond to pessary use. The patient is not sexually active. She is no longer taking any more hormone replacement, and denies any significant hot flushing..    PAST MEDICAL HISTORY:   Urge incontinence.  Hypothyroidism.  Hypertension.    PAST SURGICAL HISTORY:   1. Total abdominal hysterectomy.   2. Pubovaginal sling.  3. Colonoscopy.  4. Sacrospinous ligament suspension/rectocele repair.  5. Tonsillectomy and adenoidectomy.       PAST OB/GYN HISTORY:   Vaginal delivery x2. No previous history of abnormal Pap smears or sexually transmitted diseases. HARRISON/BSO as noted. Sacrospinous ligament suspension/rectocele repair.    SOCIAL HISTORY:   Julia is a nonsmoker and nondrinker.    MEDICATIONS AND ALLERGIES:   Her medications and allergies are as listed in the computer.     The nursing notes and vital signs were appreciated.     FAMILY HISTORY:   Negative for breast, ovarian or colon cancer. There is no diabetes. There is a family history of heart disease.    REVIEW OF SYSTEMS:   Currently, the patient denies any persistent bloating,  indigestion or early satiety. Continued issues with urge incontinence, although the patient is satisfied with the response she has to Detrol.  She continues to get up at least once at night, but this is improved from previous years. She continues to have significant dry mouth and some constipation, but does not want to change to another medication. Last year, we attempted to get the patient on Myrbetriq, but it was too expensive. I did answer multiple questions that the patient had her to alternate medications, but they are not currently on the preferred medication list for Medicare.    Julia is up-to-date with her colonoscopy. She is due for a mammogram and will be scheduling this. The patient would like a flu shot today. Dr. Shields has been following Julia's lab work.    PHYSICAL EXAM:   Visit Vitals  /82   Pulse 72   Ht 5' 2\" (1.575 m)   Wt 61.8 kg   LMP 1976   BMI 24.91 kg/m²     General:  Alert and oriented ×3. Pleasant and cooperative. In no apparent distress.  HEENT: Within normal limits. No lymphadenopathy or thyromegaly.   CHEST: Clear to auscultation, no costovertebral angle tenderness.   CARDIOVASCULAR: Regular rate and rhythm. No murmurs.  BREASTS: There is no evidence of any mass or discharge on either side. The breasts are symmetric and nontender.  ABDOMEN: Soft, nontender, no masses.   GENITOURINARY: Normal external genitalia. There is evidence of a anterior colpopexy. There is good support of the urethra. To speculum exam, the cervix is surgically absent. There is minimal descent of the vaginal vault. The vagina is otherwise clear. On bimanual exam, there are no adnexal masses. Rectal exam is normal, with excellent sphincter tone.     ASSESSMENT:   A 78 year old,  2 para  with stable GYN exam. Good response to sacrospinous ligament suspension. History of urge incontinence.     PLAN:   1. Obtain mammogram.  2. Continue on Detrol LA.  3. Continue vitamin D supplementation.  4.  Influenza vaccine.  5. Return visit in 1-2 years or as needed for problems.      The patient understood and had all of her questions addressed. She was given an after visit summary.      Please note, today's visit lasted a full 25 minutes. Greater than 50% at this time was spent discussing the patient's or urge incontinence and her prolapse history.         20

## 2017-10-17 ENCOUNTER — APPOINTMENT (OUTPATIENT)
Dept: ORTHOPEDIC SURGERY | Facility: CLINIC | Age: 72
End: 2017-10-17
Payer: MEDICARE

## 2017-10-17 PROCEDURE — 99024 POSTOP FOLLOW-UP VISIT: CPT

## 2017-10-17 PROCEDURE — 73630 X-RAY EXAM OF FOOT: CPT | Mod: RT

## 2017-10-19 PROCEDURE — 88305 TISSUE EXAM BY PATHOLOGIST: CPT

## 2017-10-19 PROCEDURE — 97530 THERAPEUTIC ACTIVITIES: CPT

## 2017-10-19 PROCEDURE — 80048 BASIC METABOLIC PNL TOTAL CA: CPT

## 2017-10-19 PROCEDURE — 97110 THERAPEUTIC EXERCISES: CPT

## 2017-10-19 PROCEDURE — 94664 DEMO&/EVAL PT USE INHALER: CPT

## 2017-10-19 PROCEDURE — 94640 AIRWAY INHALATION TREATMENT: CPT

## 2017-10-19 PROCEDURE — 97161 PT EVAL LOW COMPLEX 20 MIN: CPT

## 2017-10-19 PROCEDURE — C1889: CPT

## 2017-10-19 PROCEDURE — 88311 DECALCIFY TISSUE: CPT

## 2017-10-19 PROCEDURE — 84132 ASSAY OF SERUM POTASSIUM: CPT

## 2017-10-19 PROCEDURE — 85730 THROMBOPLASTIN TIME PARTIAL: CPT

## 2017-10-19 PROCEDURE — 85610 PROTHROMBIN TIME: CPT

## 2017-10-19 PROCEDURE — 85027 COMPLETE CBC AUTOMATED: CPT

## 2017-10-19 PROCEDURE — 97116 GAIT TRAINING THERAPY: CPT

## 2017-10-19 PROCEDURE — C1713: CPT

## 2017-10-19 PROCEDURE — 76000 FLUOROSCOPY <1 HR PHYS/QHP: CPT

## 2017-10-19 PROCEDURE — 97165 OT EVAL LOW COMPLEX 30 MIN: CPT

## 2017-11-17 ENCOUNTER — OTHER (OUTPATIENT)
Age: 72
End: 2017-11-17

## 2017-11-27 ENCOUNTER — OUTPATIENT (OUTPATIENT)
Dept: OUTPATIENT SERVICES | Facility: HOSPITAL | Age: 72
LOS: 1 days | Discharge: HOME | End: 2017-11-27

## 2017-11-27 DIAGNOSIS — Z98.89 OTHER SPECIFIED POSTPROCEDURAL STATES: Chronic | ICD-10-CM

## 2017-11-27 DIAGNOSIS — Z09 ENCOUNTER FOR FOLLOW-UP EXAMINATION AFTER COMPLETED TREATMENT FOR CONDITIONS OTHER THAN MALIGNANT NEOPLASM: Chronic | ICD-10-CM

## 2017-11-27 DIAGNOSIS — Z98.890 OTHER SPECIFIED POSTPROCEDURAL STATES: Chronic | ICD-10-CM

## 2017-11-27 DIAGNOSIS — I82.409 ACUTE EMBOLISM AND THROMBOSIS OF UNSPECIFIED DEEP VEINS OF UNSPECIFIED LOWER EXTREMITY: ICD-10-CM

## 2017-11-27 DIAGNOSIS — I83.93 ASYMPTOMATIC VARICOSE VEINS OF BILATERAL LOWER EXTREMITIES: Chronic | ICD-10-CM

## 2017-11-28 ENCOUNTER — APPOINTMENT (OUTPATIENT)
Dept: CT IMAGING | Facility: IMAGING CENTER | Age: 72
End: 2017-11-28
Payer: MEDICARE

## 2017-11-28 ENCOUNTER — OUTPATIENT (OUTPATIENT)
Dept: OUTPATIENT SERVICES | Facility: HOSPITAL | Age: 72
LOS: 1 days | End: 2017-11-28
Payer: MEDICARE

## 2017-11-28 ENCOUNTER — APPOINTMENT (OUTPATIENT)
Dept: ORTHOPEDIC SURGERY | Facility: CLINIC | Age: 72
End: 2017-11-28
Payer: MEDICARE

## 2017-11-28 VITALS
HEART RATE: 75 BPM | BODY MASS INDEX: 45.08 KG/M2 | DIASTOLIC BLOOD PRESSURE: 77 MMHG | SYSTOLIC BLOOD PRESSURE: 126 MMHG | HEIGHT: 62 IN | WEIGHT: 245 LBS

## 2017-11-28 DIAGNOSIS — Z98.1 ARTHRODESIS STATUS: ICD-10-CM

## 2017-11-28 DIAGNOSIS — Z00.8 ENCOUNTER FOR OTHER GENERAL EXAMINATION: ICD-10-CM

## 2017-11-28 DIAGNOSIS — Z09 ENCOUNTER FOR FOLLOW-UP EXAMINATION AFTER COMPLETED TREATMENT FOR CONDITIONS OTHER THAN MALIGNANT NEOPLASM: Chronic | ICD-10-CM

## 2017-11-28 DIAGNOSIS — Z98.89 OTHER SPECIFIED POSTPROCEDURAL STATES: Chronic | ICD-10-CM

## 2017-11-28 DIAGNOSIS — Z98.890 OTHER SPECIFIED POSTPROCEDURAL STATES: Chronic | ICD-10-CM

## 2017-11-28 DIAGNOSIS — I83.93 ASYMPTOMATIC VARICOSE VEINS OF BILATERAL LOWER EXTREMITIES: Chronic | ICD-10-CM

## 2017-11-28 PROCEDURE — 71250 CT THORAX DX C-: CPT

## 2017-11-28 PROCEDURE — 73610 X-RAY EXAM OF ANKLE: CPT | Mod: RT

## 2017-11-28 PROCEDURE — 73630 X-RAY EXAM OF FOOT: CPT | Mod: RT

## 2017-11-28 PROCEDURE — 99024 POSTOP FOLLOW-UP VISIT: CPT

## 2017-11-28 PROCEDURE — 71250 CT THORAX DX C-: CPT | Mod: 26

## 2017-12-01 ENCOUNTER — RESULT REVIEW (OUTPATIENT)
Age: 72
End: 2017-12-01

## 2017-12-01 ENCOUNTER — APPOINTMENT (OUTPATIENT)
Dept: HEMATOLOGY ONCOLOGY | Facility: CLINIC | Age: 72
End: 2017-12-01
Payer: MEDICARE

## 2017-12-01 ENCOUNTER — OUTPATIENT (OUTPATIENT)
Dept: OUTPATIENT SERVICES | Facility: HOSPITAL | Age: 72
LOS: 1 days | End: 2017-12-01
Payer: MEDICARE

## 2017-12-01 VITALS
RESPIRATION RATE: 16 BRPM | TEMPERATURE: 98 F | HEART RATE: 71 BPM | DIASTOLIC BLOOD PRESSURE: 82 MMHG | WEIGHT: 245 LBS | OXYGEN SATURATION: 99 % | BODY MASS INDEX: 44.81 KG/M2 | SYSTOLIC BLOOD PRESSURE: 130 MMHG

## 2017-12-01 DIAGNOSIS — Z98.89 OTHER SPECIFIED POSTPROCEDURAL STATES: Chronic | ICD-10-CM

## 2017-12-01 DIAGNOSIS — I82.409 ACUTE EMBOLISM AND THROMBOSIS OF UNSPECIFIED DEEP VEINS OF UNSPECIFIED LOWER EXTREMITY: ICD-10-CM

## 2017-12-01 DIAGNOSIS — Z09 ENCOUNTER FOR FOLLOW-UP EXAMINATION AFTER COMPLETED TREATMENT FOR CONDITIONS OTHER THAN MALIGNANT NEOPLASM: Chronic | ICD-10-CM

## 2017-12-01 DIAGNOSIS — I83.93 ASYMPTOMATIC VARICOSE VEINS OF BILATERAL LOWER EXTREMITIES: Chronic | ICD-10-CM

## 2017-12-01 DIAGNOSIS — Z98.890 OTHER SPECIFIED POSTPROCEDURAL STATES: Chronic | ICD-10-CM

## 2017-12-01 LAB
APTT BLD: 40.9 SEC
AT III PPP CHRO-ACNC: 112 %
HCT VFR BLD CALC: 34.4 % — LOW (ref 34.5–45)
HGB BLD-MCNC: 11 G/DL — LOW (ref 11.5–15.5)
INR PPP: 2.49 RATIO
MCHC RBC-ENTMCNC: 22.3 PG — LOW (ref 27–34)
MCHC RBC-ENTMCNC: 32 G/DL — SIGNIFICANT CHANGE UP (ref 32–36)
MCV RBC AUTO: 69.8 FL — LOW (ref 80–100)
PLATELET # BLD AUTO: 314 K/UL — SIGNIFICANT CHANGE UP (ref 150–400)
PT BLD: 28.7 SEC
RBC # BLD: 4.93 M/UL — SIGNIFICANT CHANGE UP (ref 3.8–5.2)
RBC # FLD: 14.5 % — SIGNIFICANT CHANGE UP (ref 10.3–14.5)
WBC # BLD: 5.3 K/UL — SIGNIFICANT CHANGE UP (ref 3.8–10.5)
WBC # FLD AUTO: 5.3 K/UL — SIGNIFICANT CHANGE UP (ref 3.8–10.5)

## 2017-12-01 PROCEDURE — G0452: CPT | Mod: 26

## 2017-12-01 PROCEDURE — 99215 OFFICE O/P EST HI 40 MIN: CPT

## 2017-12-01 PROCEDURE — 81240 F2 GENE: CPT

## 2017-12-01 RX ORDER — ENOXAPARIN SODIUM 150 MG/ML
120 INJECTION SUBCUTANEOUS
Qty: 45 | Refills: 1 | Status: DISCONTINUED | COMMUNITY
Start: 2017-07-26 | End: 2017-12-01

## 2017-12-06 LAB — PTR INTERPRETATION: SIGNIFICANT CHANGE UP

## 2018-01-09 ENCOUNTER — APPOINTMENT (OUTPATIENT)
Dept: ORTHOPEDIC SURGERY | Facility: CLINIC | Age: 73
End: 2018-01-09
Payer: MEDICARE

## 2018-01-09 VITALS
SYSTOLIC BLOOD PRESSURE: 107 MMHG | BODY MASS INDEX: 45.08 KG/M2 | DIASTOLIC BLOOD PRESSURE: 71 MMHG | WEIGHT: 245 LBS | HEART RATE: 81 BPM | HEIGHT: 62 IN

## 2018-01-09 PROCEDURE — 99213 OFFICE O/P EST LOW 20 MIN: CPT

## 2018-01-09 PROCEDURE — 73630 X-RAY EXAM OF FOOT: CPT | Mod: RT

## 2018-01-12 ENCOUNTER — EMERGENCY (EMERGENCY)
Facility: HOSPITAL | Age: 73
LOS: 1 days | Discharge: HOME | End: 2018-01-12
Attending: EMERGENCY MEDICINE | Admitting: EMERGENCY MEDICINE
Payer: MEDICARE

## 2018-01-12 VITALS
RESPIRATION RATE: 20 BRPM | SYSTOLIC BLOOD PRESSURE: 149 MMHG | TEMPERATURE: 98 F | DIASTOLIC BLOOD PRESSURE: 83 MMHG | OXYGEN SATURATION: 99 % | HEART RATE: 71 BPM

## 2018-01-12 DIAGNOSIS — Z98.89 OTHER SPECIFIED POSTPROCEDURAL STATES: Chronic | ICD-10-CM

## 2018-01-12 DIAGNOSIS — Z98.890 OTHER SPECIFIED POSTPROCEDURAL STATES: Chronic | ICD-10-CM

## 2018-01-12 DIAGNOSIS — I83.93 ASYMPTOMATIC VARICOSE VEINS OF BILATERAL LOWER EXTREMITIES: Chronic | ICD-10-CM

## 2018-01-12 DIAGNOSIS — Z09 ENCOUNTER FOR FOLLOW-UP EXAMINATION AFTER COMPLETED TREATMENT FOR CONDITIONS OTHER THAN MALIGNANT NEOPLASM: Chronic | ICD-10-CM

## 2018-01-12 LAB
ALBUMIN SERPL ELPH-MCNC: 4.3 G/DL — SIGNIFICANT CHANGE UP (ref 3.3–5)
ALP SERPL-CCNC: 114 U/L — SIGNIFICANT CHANGE UP (ref 40–120)
ALT FLD-CCNC: 11 U/L — SIGNIFICANT CHANGE UP (ref 4–33)
APTT BLD: 39.9 SEC — HIGH (ref 27.5–37.4)
AST SERPL-CCNC: 15 U/L — SIGNIFICANT CHANGE UP (ref 4–32)
BASE EXCESS BLDV CALC-SCNC: 3.6 MMOL/L — SIGNIFICANT CHANGE UP
BASOPHILS # BLD AUTO: 0.06 K/UL — SIGNIFICANT CHANGE UP (ref 0–0.2)
BASOPHILS NFR BLD AUTO: 0.8 % — SIGNIFICANT CHANGE UP (ref 0–2)
BILIRUB SERPL-MCNC: < 0.2 MG/DL — LOW (ref 0.2–1.2)
BLOOD GAS VENOUS - CREATININE: 0.68 MG/DL — SIGNIFICANT CHANGE UP (ref 0.5–1.3)
BUN SERPL-MCNC: 15 MG/DL — SIGNIFICANT CHANGE UP (ref 7–23)
CALCIUM SERPL-MCNC: 8.7 MG/DL — SIGNIFICANT CHANGE UP (ref 8.4–10.5)
CHLORIDE BLDV-SCNC: 101 MMOL/L — SIGNIFICANT CHANGE UP (ref 96–108)
CHLORIDE SERPL-SCNC: 98 MMOL/L — SIGNIFICANT CHANGE UP (ref 98–107)
CK MB BLD-MCNC: 1.1 — SIGNIFICANT CHANGE UP (ref 0–2.5)
CK MB BLD-MCNC: 2.65 NG/ML — SIGNIFICANT CHANGE UP (ref 1–4.7)
CK SERPL-CCNC: 248 U/L — HIGH (ref 25–170)
CO2 SERPL-SCNC: 26 MMOL/L — SIGNIFICANT CHANGE UP (ref 22–31)
CREAT SERPL-MCNC: 0.76 MG/DL — SIGNIFICANT CHANGE UP (ref 0.5–1.3)
EOSINOPHIL # BLD AUTO: 0.05 K/UL — SIGNIFICANT CHANGE UP (ref 0–0.5)
EOSINOPHIL NFR BLD AUTO: 0.7 % — SIGNIFICANT CHANGE UP (ref 0–6)
GAS PNL BLDV: 132 MMOL/L — LOW (ref 136–146)
GLUCOSE BLDV-MCNC: 98 — SIGNIFICANT CHANGE UP (ref 70–99)
GLUCOSE SERPL-MCNC: 96 MG/DL — SIGNIFICANT CHANGE UP (ref 70–99)
HCO3 BLDV-SCNC: 27 MMOL/L — SIGNIFICANT CHANGE UP (ref 20–27)
HCT VFR BLD CALC: 34.5 % — SIGNIFICANT CHANGE UP (ref 34.5–45)
HCT VFR BLDV CALC: 34.5 % — SIGNIFICANT CHANGE UP (ref 34.5–45)
HGB BLD-MCNC: 10.9 G/DL — LOW (ref 11.5–15.5)
HGB BLDV-MCNC: 11.2 G/DL — LOW (ref 11.5–15.5)
IMM GRANULOCYTES # BLD AUTO: 0.04 # — SIGNIFICANT CHANGE UP
IMM GRANULOCYTES NFR BLD AUTO: 0.5 % — SIGNIFICANT CHANGE UP (ref 0–1.5)
INR BLD: 2.17 — HIGH (ref 0.88–1.17)
LACTATE BLDV-MCNC: 1.6 MMOL/L — SIGNIFICANT CHANGE UP (ref 0.5–2)
LYMPHOCYTES # BLD AUTO: 2.46 K/UL — SIGNIFICANT CHANGE UP (ref 1–3.3)
LYMPHOCYTES # BLD AUTO: 32.2 % — SIGNIFICANT CHANGE UP (ref 13–44)
MCHC RBC-ENTMCNC: 21.3 PG — LOW (ref 27–34)
MCHC RBC-ENTMCNC: 31.6 % — LOW (ref 32–36)
MCV RBC AUTO: 67.5 FL — LOW (ref 80–100)
MONOCYTES # BLD AUTO: 0.72 K/UL — SIGNIFICANT CHANGE UP (ref 0–0.9)
MONOCYTES NFR BLD AUTO: 9.4 % — SIGNIFICANT CHANGE UP (ref 2–14)
NEUTROPHILS # BLD AUTO: 4.32 K/UL — SIGNIFICANT CHANGE UP (ref 1.8–7.4)
NEUTROPHILS NFR BLD AUTO: 56.4 % — SIGNIFICANT CHANGE UP (ref 43–77)
NRBC # FLD: 0 — SIGNIFICANT CHANGE UP
PCO2 BLDV: 39 MMHG — LOW (ref 41–51)
PH BLDV: 7.46 PH — HIGH (ref 7.32–7.43)
PLATELET # BLD AUTO: 338 K/UL — SIGNIFICANT CHANGE UP (ref 150–400)
PMV BLD: 9.4 FL — SIGNIFICANT CHANGE UP (ref 7–13)
PO2 BLDV: < 24 MMHG — LOW (ref 35–40)
POTASSIUM BLDV-SCNC: 3.3 MMOL/L — LOW (ref 3.4–4.5)
POTASSIUM SERPL-MCNC: 3.6 MMOL/L — SIGNIFICANT CHANGE UP (ref 3.5–5.3)
POTASSIUM SERPL-SCNC: 3.6 MMOL/L — SIGNIFICANT CHANGE UP (ref 3.5–5.3)
PROT SERPL-MCNC: 7.5 G/DL — SIGNIFICANT CHANGE UP (ref 6–8.3)
PROTHROM AB SERPL-ACNC: 25.3 SEC — HIGH (ref 9.8–13.1)
RBC # BLD: 5.11 M/UL — SIGNIFICANT CHANGE UP (ref 3.8–5.2)
RBC # FLD: 18.1 % — HIGH (ref 10.3–14.5)
SAO2 % BLDV: 38.3 % — LOW (ref 60–85)
SODIUM SERPL-SCNC: 139 MMOL/L — SIGNIFICANT CHANGE UP (ref 135–145)
TROPONIN T SERPL-MCNC: < 0.06 NG/ML — SIGNIFICANT CHANGE UP (ref 0–0.06)
WBC # BLD: 7.65 K/UL — SIGNIFICANT CHANGE UP (ref 3.8–10.5)
WBC # FLD AUTO: 7.65 K/UL — SIGNIFICANT CHANGE UP (ref 3.8–10.5)

## 2018-01-12 PROCEDURE — 99284 EMERGENCY DEPT VISIT MOD MDM: CPT | Mod: GC

## 2018-01-12 PROCEDURE — 71046 X-RAY EXAM CHEST 2 VIEWS: CPT | Mod: 26

## 2018-01-12 RX ORDER — IPRATROPIUM/ALBUTEROL SULFATE 18-103MCG
3 AEROSOL WITH ADAPTER (GRAM) INHALATION ONCE
Qty: 0 | Refills: 0 | Status: COMPLETED | OUTPATIENT
Start: 2018-01-12 | End: 2018-01-12

## 2018-01-12 RX ADMIN — Medication 3 MILLILITER(S): at 23:50

## 2018-01-12 NOTE — ED PROVIDER NOTE - PHYSICAL EXAMINATION
*GEN:   comfortable, in no acute distress, AOx3    ///    *EYES:   pupils equally round and reactive to light, extra-occular movements intact    ///    *HEENT:   airway patent, moist mucosal membranes    ///    *CV:   regular rate and rhythm, normal S1/S2; bilat LE pitting edema R > L    ///    *RESP:   clear to auscultation bilaterally, non-labored    ///    *ABD:   soft, non-tender    ///    *:   no cva/flank tenderness    ///    *MSK:   no MSK tenderness or limited ROM    ///    *SKIN:   dry, intact    ///    *NEURO:   AOx3, no focal weakness or loss of sensation

## 2018-01-12 NOTE — ED PROVIDER NOTE - MEDICAL DECISION MAKING DETAILS
73 yo F w/ pmh asthma, PE p/w sob, cough, chest pressure; r/o ACS, pna, PE; likely tba 73 yo F w/ pmh asthma, PE p/w sob, cough, chest pressure; r/o ACS, pna, PE

## 2018-01-12 NOTE — ED ADULT NURSE NOTE - PMH
Asthma  well controlled, no recent hospitalizations  Deep venous thrombosis  2005, 2014 x 2  Flat feet, bilateral    Foot pain, right    HTN (hypertension)    Liver nodule    Neuropathy  right foot  Osteoarthritis    PE (pulmonary embolism)  s/p [place] Filter- 2005  Polymyalgia rheumatica    Protein S deficiency  unconfirmed as of yet  Spondylisthesis    Thalassemia, beta

## 2018-01-12 NOTE — ED PROVIDER NOTE - PROGRESS NOTE DETAILS
Pt improved. VSS. Labs, ecg, imaging appreciated. Will dc with pmd/cards f/u. Patient is stable, vital signs stable. Patient with capacity, able to verbalize understanding of discharge instructions, return instructions, and need for close follow up.  Sam Isaacs MD PGY-4

## 2018-01-12 NOTE — ED ADULT NURSE NOTE - OBJECTIVE STATEMENT
pt on bed aox3 c/o SOB at rest worse with exertion, Dx with Asthma, took her, medication for Asthma with no relief, around 6pm satrted havong MId chest pressure, DX with PE/DVT on coumadin, went to Urgent care and sent here for further eval. denies HA dizziness palpitation N V sweating waiting for MD to eval on cm sinus rhythm will monitor

## 2018-01-12 NOTE — ED PROVIDER NOTE - PMH
Asthma  well controlled, no recent hospitalizations  Deep venous thrombosis  2005, 2014 x 2  Flat feet, bilateral    Foot pain, right    HTN (hypertension)    Liver nodule    Neuropathy  right foot  Osteoarthritis    PE (pulmonary embolism)  s/p [place] Filter- 2005  Polymyalgia rheumatica    Protein S deficiency  unconfirmed as of yet  Spondylisthesis    Thalassemia, beta

## 2018-01-12 NOTE — ED PROVIDER NOTE - ATTENDING CONTRIBUTION TO CARE
agree with resident note  73 yo F w/ pmh HTN, HLD, asthma, DVT, PE s/p keith 2005 (coumadin), b-thal p/w worsening dry cough, sob at rest, chest pressure x 1 day.  Took asthma meds without relief and states not like her asthma and concerned given PE hx.  Unsure of prior cardiac workup    PE: mildly dyspneic in room; VSS: CTAB/L; s1 s2 no m/r/g abd soft/NT/ND ext: no edema

## 2018-01-13 VITALS
OXYGEN SATURATION: 99 % | SYSTOLIC BLOOD PRESSURE: 132 MMHG | HEART RATE: 76 BPM | DIASTOLIC BLOOD PRESSURE: 64 MMHG | RESPIRATION RATE: 16 BRPM

## 2018-01-13 LAB
APPEARANCE UR: CLEAR — SIGNIFICANT CHANGE UP
BACTERIA # UR AUTO: SIGNIFICANT CHANGE UP
BILIRUB UR-MCNC: NEGATIVE — SIGNIFICANT CHANGE UP
BLOOD UR QL VISUAL: NEGATIVE — SIGNIFICANT CHANGE UP
CK SERPL-CCNC: 230 U/L — HIGH (ref 25–170)
COLOR SPEC: SIGNIFICANT CHANGE UP
GLUCOSE UR-MCNC: NEGATIVE — SIGNIFICANT CHANGE UP
KETONES UR-MCNC: NEGATIVE — SIGNIFICANT CHANGE UP
LEUKOCYTE ESTERASE UR-ACNC: NEGATIVE — SIGNIFICANT CHANGE UP
MUCOUS THREADS # UR AUTO: SIGNIFICANT CHANGE UP
NITRITE UR-MCNC: NEGATIVE — SIGNIFICANT CHANGE UP
PH UR: 7.5 — SIGNIFICANT CHANGE UP (ref 4.6–8)
PROT UR-MCNC: NEGATIVE MG/DL — SIGNIFICANT CHANGE UP
RBC CASTS # UR COMP ASSIST: SIGNIFICANT CHANGE UP (ref 0–?)
SP GR SPEC: 1.01 — SIGNIFICANT CHANGE UP (ref 1–1.04)
SQUAMOUS # UR AUTO: SIGNIFICANT CHANGE UP
TROPONIN T SERPL-MCNC: < 0.06 NG/ML — SIGNIFICANT CHANGE UP (ref 0–0.06)
UROBILINOGEN FLD QL: NORMAL MG/DL — SIGNIFICANT CHANGE UP
WBC UR QL: SIGNIFICANT CHANGE UP (ref 0–?)

## 2018-01-13 PROCEDURE — 71275 CT ANGIOGRAPHY CHEST: CPT | Mod: 26

## 2018-01-18 ENCOUNTER — OTHER (OUTPATIENT)
Age: 73
End: 2018-01-18

## 2018-01-18 ENCOUNTER — RX RENEWAL (OUTPATIENT)
Age: 73
End: 2018-01-18

## 2018-01-18 RX ORDER — PREGABALIN 75 MG/1
75 CAPSULE ORAL
Qty: 360 | Refills: 0 | Status: COMPLETED | COMMUNITY
Start: 2017-02-02 | End: 2018-01-18

## 2018-01-22 ENCOUNTER — RX RENEWAL (OUTPATIENT)
Age: 73
End: 2018-01-22

## 2018-01-22 RX ORDER — PREGABALIN 75 MG/1
75 CAPSULE ORAL
Qty: 360 | Refills: 0 | Status: COMPLETED | COMMUNITY
Start: 2017-02-02 | End: 2018-01-22

## 2018-01-24 ENCOUNTER — OTHER (OUTPATIENT)
Age: 73
End: 2018-01-24

## 2018-02-27 ENCOUNTER — APPOINTMENT (OUTPATIENT)
Dept: ORTHOPEDIC SURGERY | Facility: CLINIC | Age: 73
End: 2018-02-27
Payer: MEDICARE

## 2018-02-27 VITALS
DIASTOLIC BLOOD PRESSURE: 89 MMHG | HEIGHT: 62 IN | WEIGHT: 244 LBS | SYSTOLIC BLOOD PRESSURE: 144 MMHG | HEART RATE: 80 BPM | BODY MASS INDEX: 44.9 KG/M2

## 2018-02-27 PROCEDURE — 73630 X-RAY EXAM OF FOOT: CPT | Mod: RT

## 2018-02-27 PROCEDURE — 99213 OFFICE O/P EST LOW 20 MIN: CPT

## 2018-03-04 ENCOUNTER — FORM ENCOUNTER (OUTPATIENT)
Age: 73
End: 2018-03-04

## 2018-03-05 ENCOUNTER — OUTPATIENT (OUTPATIENT)
Dept: OUTPATIENT SERVICES | Facility: HOSPITAL | Age: 73
LOS: 1 days | End: 2018-03-05
Payer: MEDICARE

## 2018-03-05 ENCOUNTER — APPOINTMENT (OUTPATIENT)
Dept: CT IMAGING | Facility: IMAGING CENTER | Age: 73
End: 2018-03-05
Payer: MEDICARE

## 2018-03-05 DIAGNOSIS — I83.93 ASYMPTOMATIC VARICOSE VEINS OF BILATERAL LOWER EXTREMITIES: Chronic | ICD-10-CM

## 2018-03-05 DIAGNOSIS — M25.571 PAIN IN RIGHT ANKLE AND JOINTS OF RIGHT FOOT: ICD-10-CM

## 2018-03-05 DIAGNOSIS — Z98.890 OTHER SPECIFIED POSTPROCEDURAL STATES: ICD-10-CM

## 2018-03-05 DIAGNOSIS — Z09 ENCOUNTER FOR FOLLOW-UP EXAMINATION AFTER COMPLETED TREATMENT FOR CONDITIONS OTHER THAN MALIGNANT NEOPLASM: Chronic | ICD-10-CM

## 2018-03-05 DIAGNOSIS — Z98.89 OTHER SPECIFIED POSTPROCEDURAL STATES: Chronic | ICD-10-CM

## 2018-03-05 DIAGNOSIS — Z98.890 OTHER SPECIFIED POSTPROCEDURAL STATES: Chronic | ICD-10-CM

## 2018-03-05 PROCEDURE — 73700 CT LOWER EXTREMITY W/O DYE: CPT | Mod: 26,RT

## 2018-03-05 PROCEDURE — 76377 3D RENDER W/INTRP POSTPROCES: CPT | Mod: 26

## 2018-03-05 PROCEDURE — 73700 CT LOWER EXTREMITY W/O DYE: CPT

## 2018-03-05 PROCEDURE — 76377 3D RENDER W/INTRP POSTPROCES: CPT

## 2018-03-09 ENCOUNTER — APPOINTMENT (OUTPATIENT)
Dept: OBGYN | Facility: CLINIC | Age: 73
End: 2018-03-09
Payer: MEDICARE

## 2018-03-09 VITALS
SYSTOLIC BLOOD PRESSURE: 146 MMHG | HEIGHT: 62 IN | BODY MASS INDEX: 45.27 KG/M2 | HEART RATE: 80 BPM | DIASTOLIC BLOOD PRESSURE: 82 MMHG | WEIGHT: 246 LBS

## 2018-03-09 DIAGNOSIS — Z01.419 ENCOUNTER FOR GYNECOLOGICAL EXAMINATION (GENERAL) (ROUTINE) W/OUT ABNORMAL FINDINGS: ICD-10-CM

## 2018-03-09 DIAGNOSIS — N95.2 POSTMENOPAUSAL ATROPHIC VAGINITIS: ICD-10-CM

## 2018-03-09 DIAGNOSIS — B97.7 PAPILLOMAVIRUS AS THE CAUSE OF DISEASES CLASSIFIED ELSEWHERE: ICD-10-CM

## 2018-03-09 PROCEDURE — 99397 PER PM REEVAL EST PAT 65+ YR: CPT

## 2018-03-09 PROCEDURE — 99213 OFFICE O/P EST LOW 20 MIN: CPT | Mod: 25

## 2018-03-13 ENCOUNTER — APPOINTMENT (OUTPATIENT)
Dept: ORTHOPEDIC SURGERY | Facility: CLINIC | Age: 73
End: 2018-03-13
Payer: MEDICARE

## 2018-03-13 VITALS
WEIGHT: 246 LBS | DIASTOLIC BLOOD PRESSURE: 76 MMHG | SYSTOLIC BLOOD PRESSURE: 112 MMHG | BODY MASS INDEX: 45.27 KG/M2 | HEART RATE: 76 BPM | HEIGHT: 62 IN

## 2018-03-13 LAB — HPV HIGH+LOW RISK DNA PNL CVX: NOT DETECTED

## 2018-03-13 PROCEDURE — 99213 OFFICE O/P EST LOW 20 MIN: CPT

## 2018-03-16 LAB — CYTOLOGY CVX/VAG DOC THIN PREP: NORMAL

## 2018-04-03 ENCOUNTER — APPOINTMENT (OUTPATIENT)
Dept: INTERNAL MEDICINE | Facility: CLINIC | Age: 73
End: 2018-04-03
Payer: MEDICARE

## 2018-04-03 VITALS
HEIGHT: 62 IN | DIASTOLIC BLOOD PRESSURE: 80 MMHG | HEART RATE: 82 BPM | SYSTOLIC BLOOD PRESSURE: 144 MMHG | OXYGEN SATURATION: 95 % | BODY MASS INDEX: 46.01 KG/M2 | WEIGHT: 250 LBS

## 2018-04-03 DIAGNOSIS — R06.83 SNORING: ICD-10-CM

## 2018-04-03 LAB
INR PPP: 1.8 RATIO
POCT-PROTHROMBIN TIME: 21.9 SECS
QUALITY CONTROL: NORMAL

## 2018-04-03 PROCEDURE — G0438: CPT | Mod: 25

## 2018-04-03 PROCEDURE — 85610 PROTHROMBIN TIME: CPT | Mod: QW

## 2018-04-03 NOTE — PROGRESS NOTE ADULT - PROBLEM/PLAN-1
Remains poorly controlled due to noncompliance  Counselled pt  Start Metformin 500mg bid  I reviewed side effects   Recheck labs in 
DISPLAY PLAN FREE TEXT
DISPLAY PLAN FREE TEXT

## 2018-04-04 ENCOUNTER — OTHER (OUTPATIENT)
Age: 73
End: 2018-04-04

## 2018-04-16 PROBLEM — R06.83 PRIMARY SNORING: Status: ACTIVE | Noted: 2018-04-03

## 2018-04-17 ENCOUNTER — APPOINTMENT (OUTPATIENT)
Dept: ORTHOPEDIC SURGERY | Facility: CLINIC | Age: 73
End: 2018-04-17
Payer: MEDICARE

## 2018-04-17 ENCOUNTER — APPOINTMENT (OUTPATIENT)
Dept: INTERNAL MEDICINE | Facility: CLINIC | Age: 73
End: 2018-04-17

## 2018-04-17 VITALS
BODY MASS INDEX: 44.72 KG/M2 | HEIGHT: 62 IN | WEIGHT: 243 LBS | DIASTOLIC BLOOD PRESSURE: 84 MMHG | HEART RATE: 71 BPM | SYSTOLIC BLOOD PRESSURE: 131 MMHG

## 2018-04-17 PROCEDURE — 73562 X-RAY EXAM OF KNEE 3: CPT | Mod: RT

## 2018-04-17 PROCEDURE — 99213 OFFICE O/P EST LOW 20 MIN: CPT | Mod: 25

## 2018-04-17 PROCEDURE — 73630 X-RAY EXAM OF FOOT: CPT | Mod: RT

## 2018-04-17 PROCEDURE — 20610 DRAIN/INJ JOINT/BURSA W/O US: CPT | Mod: RT

## 2018-04-17 RX ADMIN — METHYLPREDNISOLONE ACETATE MG/ML: 40 INJECTION, SUSPENSION INTRA-ARTICULAR; INTRALESIONAL; INTRAMUSCULAR; SOFT TISSUE at 00:00

## 2018-04-17 RX ADMIN — LIDOCAINE HYDROCHLORIDE %: 10 INJECTION, SOLUTION INFILTRATION; PERINEURAL at 00:00

## 2018-04-18 ENCOUNTER — APPOINTMENT (OUTPATIENT)
Dept: RHEUMATOLOGY | Facility: CLINIC | Age: 73
End: 2018-04-18
Payer: MEDICARE

## 2018-04-18 ENCOUNTER — TRANSCRIPTION ENCOUNTER (OUTPATIENT)
Age: 73
End: 2018-04-18

## 2018-04-18 VITALS
HEART RATE: 76 BPM | HEIGHT: 62 IN | RESPIRATION RATE: 16 BRPM | SYSTOLIC BLOOD PRESSURE: 131 MMHG | OXYGEN SATURATION: 96 % | BODY MASS INDEX: 44.72 KG/M2 | DIASTOLIC BLOOD PRESSURE: 82 MMHG | WEIGHT: 243 LBS | TEMPERATURE: 98.2 F

## 2018-04-18 PROCEDURE — 99213 OFFICE O/P EST LOW 20 MIN: CPT

## 2018-04-19 RX ORDER — METHYLPRED ACET/NACL,ISO-OS/PF 40 MG/ML
40 VIAL (ML) INJECTION
Qty: 1 | Refills: 0 | Status: COMPLETED | OUTPATIENT
Start: 2018-04-17

## 2018-04-19 RX ORDER — LIDOCAINE HYDROCHLORIDE 10 MG/ML
1 INJECTION, SOLUTION INFILTRATION; PERINEURAL
Refills: 0 | Status: COMPLETED | OUTPATIENT
Start: 2018-04-17

## 2018-04-23 ENCOUNTER — LABORATORY RESULT (OUTPATIENT)
Age: 73
End: 2018-04-23

## 2018-04-23 LAB
BASOPHILS # BLD AUTO: 0 K/UL
BASOPHILS NFR BLD AUTO: 0 %
EOSINOPHIL # BLD AUTO: 0.05 K/UL
EOSINOPHIL NFR BLD AUTO: 0.9 %
HCT VFR BLD CALC: 35.2 %
HGB BLD-MCNC: 11.3 G/DL
LYMPHOCYTES # BLD AUTO: 2.21 K/UL
LYMPHOCYTES NFR BLD AUTO: 37.7 %
MAN DIFF?: NORMAL
MCHC RBC-ENTMCNC: 22.2 PG
MCHC RBC-ENTMCNC: 32.1 GM/DL
MCV RBC AUTO: 69.3 FL
MONOCYTES # BLD AUTO: 0.5 K/UL
MONOCYTES NFR BLD AUTO: 8.5 %
NEUTROPHILS # BLD AUTO: 2.54 K/UL
NEUTROPHILS NFR BLD AUTO: 42.5 %
PLATELET # BLD AUTO: 425 K/UL
RBC # BLD: 5.08 M/UL
RBC # FLD: 16 %
WBC # FLD AUTO: 5.85 K/UL

## 2018-04-24 LAB
25(OH)D3 SERPL-MCNC: 22.8 NG/ML
ALBUMIN SERPL ELPH-MCNC: 4.3 G/DL
ALP BLD-CCNC: 88 U/L
ALT SERPL-CCNC: 12 U/L
ANION GAP SERPL CALC-SCNC: 15 MMOL/L
AST SERPL-CCNC: 17 U/L
BILIRUB SERPL-MCNC: 0.3 MG/DL
BUN SERPL-MCNC: 15 MG/DL
CALCIUM SERPL-MCNC: 9.2 MG/DL
CHLORIDE SERPL-SCNC: 90 MMOL/L
CO2 SERPL-SCNC: 26 MMOL/L
CREAT SERPL-MCNC: 0.67 MG/DL
GLUCOSE SERPL-MCNC: 94 MG/DL
POTASSIUM SERPL-SCNC: 4.3 MMOL/L
PROT SERPL-MCNC: 6.9 G/DL
SODIUM SERPL-SCNC: 131 MMOL/L
TSH SERPL-ACNC: 1.47 UIU/ML

## 2018-05-01 ENCOUNTER — NON-APPOINTMENT (OUTPATIENT)
Age: 73
End: 2018-05-01

## 2018-05-01 ENCOUNTER — APPOINTMENT (OUTPATIENT)
Dept: CARDIOLOGY | Facility: CLINIC | Age: 73
End: 2018-05-01
Payer: MEDICARE

## 2018-05-01 VITALS
HEART RATE: 67 BPM | SYSTOLIC BLOOD PRESSURE: 158 MMHG | DIASTOLIC BLOOD PRESSURE: 89 MMHG | WEIGHT: 246 LBS | BODY MASS INDEX: 44.99 KG/M2 | OXYGEN SATURATION: 100 %

## 2018-05-01 VITALS — SYSTOLIC BLOOD PRESSURE: 130 MMHG | DIASTOLIC BLOOD PRESSURE: 80 MMHG

## 2018-05-01 PROCEDURE — 93000 ELECTROCARDIOGRAM COMPLETE: CPT

## 2018-05-01 PROCEDURE — 99215 OFFICE O/P EST HI 40 MIN: CPT

## 2018-05-21 ENCOUNTER — APPOINTMENT (OUTPATIENT)
Dept: CT IMAGING | Facility: IMAGING CENTER | Age: 73
End: 2018-05-21
Payer: MEDICARE

## 2018-05-21 ENCOUNTER — OUTPATIENT (OUTPATIENT)
Dept: OUTPATIENT SERVICES | Facility: HOSPITAL | Age: 73
LOS: 1 days | End: 2018-05-21
Payer: MEDICARE

## 2018-05-21 DIAGNOSIS — Z09 ENCOUNTER FOR FOLLOW-UP EXAMINATION AFTER COMPLETED TREATMENT FOR CONDITIONS OTHER THAN MALIGNANT NEOPLASM: Chronic | ICD-10-CM

## 2018-05-21 DIAGNOSIS — I83.93 ASYMPTOMATIC VARICOSE VEINS OF BILATERAL LOWER EXTREMITIES: Chronic | ICD-10-CM

## 2018-05-21 DIAGNOSIS — Z00.8 ENCOUNTER FOR OTHER GENERAL EXAMINATION: ICD-10-CM

## 2018-05-21 DIAGNOSIS — Z98.89 OTHER SPECIFIED POSTPROCEDURAL STATES: Chronic | ICD-10-CM

## 2018-05-21 DIAGNOSIS — Z98.890 OTHER SPECIFIED POSTPROCEDURAL STATES: Chronic | ICD-10-CM

## 2018-05-21 PROCEDURE — 71250 CT THORAX DX C-: CPT | Mod: 26

## 2018-05-21 PROCEDURE — 71250 CT THORAX DX C-: CPT

## 2018-05-24 ENCOUNTER — APPOINTMENT (OUTPATIENT)
Dept: OBGYN | Facility: CLINIC | Age: 73
End: 2018-05-24
Payer: MEDICARE

## 2018-05-24 VITALS
HEART RATE: 70 BPM | WEIGHT: 246 LBS | HEIGHT: 62 IN | SYSTOLIC BLOOD PRESSURE: 135 MMHG | DIASTOLIC BLOOD PRESSURE: 90 MMHG | BODY MASS INDEX: 45.27 KG/M2

## 2018-05-24 DIAGNOSIS — N64.4 MASTODYNIA: ICD-10-CM

## 2018-05-24 PROCEDURE — 99213 OFFICE O/P EST LOW 20 MIN: CPT

## 2018-05-29 ENCOUNTER — OUTPATIENT (OUTPATIENT)
Dept: OUTPATIENT SERVICES | Facility: HOSPITAL | Age: 73
LOS: 1 days | End: 2018-05-29
Payer: MEDICARE

## 2018-05-29 ENCOUNTER — APPOINTMENT (OUTPATIENT)
Dept: CV DIAGNOSITCS | Facility: HOSPITAL | Age: 73
End: 2018-05-29

## 2018-05-29 DIAGNOSIS — I83.93 ASYMPTOMATIC VARICOSE VEINS OF BILATERAL LOWER EXTREMITIES: Chronic | ICD-10-CM

## 2018-05-29 DIAGNOSIS — R07.9 CHEST PAIN, UNSPECIFIED: ICD-10-CM

## 2018-05-29 DIAGNOSIS — Z98.89 OTHER SPECIFIED POSTPROCEDURAL STATES: Chronic | ICD-10-CM

## 2018-05-29 DIAGNOSIS — Z98.890 OTHER SPECIFIED POSTPROCEDURAL STATES: Chronic | ICD-10-CM

## 2018-05-29 DIAGNOSIS — Z09 ENCOUNTER FOR FOLLOW-UP EXAMINATION AFTER COMPLETED TREATMENT FOR CONDITIONS OTHER THAN MALIGNANT NEOPLASM: Chronic | ICD-10-CM

## 2018-05-29 PROCEDURE — C8930: CPT

## 2018-05-29 PROCEDURE — 93350 STRESS TTE ONLY: CPT | Mod: 26

## 2018-05-29 PROCEDURE — 93320 DOPPLER ECHO COMPLETE: CPT | Mod: 26

## 2018-05-29 PROCEDURE — 93325 DOPPLER ECHO COLOR FLOW MAPG: CPT

## 2018-05-29 PROCEDURE — 93016 CV STRESS TEST SUPVJ ONLY: CPT

## 2018-05-29 PROCEDURE — 93018 CV STRESS TEST I&R ONLY: CPT

## 2018-05-29 PROCEDURE — 93325 DOPPLER ECHO COLOR FLOW MAPG: CPT | Mod: 26

## 2018-05-29 PROCEDURE — 93320 DOPPLER ECHO COMPLETE: CPT

## 2018-06-14 ENCOUNTER — APPOINTMENT (OUTPATIENT)
Dept: CARDIOLOGY | Facility: CLINIC | Age: 73
End: 2018-06-14
Payer: MEDICARE

## 2018-06-14 ENCOUNTER — NON-APPOINTMENT (OUTPATIENT)
Age: 73
End: 2018-06-14

## 2018-06-14 VITALS
WEIGHT: 236 LBS | BODY MASS INDEX: 43.17 KG/M2 | DIASTOLIC BLOOD PRESSURE: 72 MMHG | SYSTOLIC BLOOD PRESSURE: 119 MMHG | HEART RATE: 68 BPM

## 2018-06-14 PROCEDURE — 99215 OFFICE O/P EST HI 40 MIN: CPT

## 2018-06-14 PROCEDURE — 93000 ELECTROCARDIOGRAM COMPLETE: CPT

## 2018-06-15 ENCOUNTER — OUTPATIENT (OUTPATIENT)
Dept: OUTPATIENT SERVICES | Facility: HOSPITAL | Age: 73
LOS: 1 days | Discharge: HOME | End: 2018-06-15

## 2018-06-15 DIAGNOSIS — Z09 ENCOUNTER FOR FOLLOW-UP EXAMINATION AFTER COMPLETED TREATMENT FOR CONDITIONS OTHER THAN MALIGNANT NEOPLASM: Chronic | ICD-10-CM

## 2018-06-15 DIAGNOSIS — Z98.89 OTHER SPECIFIED POSTPROCEDURAL STATES: Chronic | ICD-10-CM

## 2018-06-15 DIAGNOSIS — Z98.890 OTHER SPECIFIED POSTPROCEDURAL STATES: Chronic | ICD-10-CM

## 2018-06-15 DIAGNOSIS — I82.409 ACUTE EMBOLISM AND THROMBOSIS OF UNSPECIFIED DEEP VEINS OF UNSPECIFIED LOWER EXTREMITY: ICD-10-CM

## 2018-06-15 DIAGNOSIS — I83.93 ASYMPTOMATIC VARICOSE VEINS OF BILATERAL LOWER EXTREMITIES: Chronic | ICD-10-CM

## 2018-06-18 ENCOUNTER — RESULT REVIEW (OUTPATIENT)
Age: 73
End: 2018-06-18

## 2018-06-18 ENCOUNTER — APPOINTMENT (OUTPATIENT)
Dept: HEMATOLOGY ONCOLOGY | Facility: CLINIC | Age: 73
End: 2018-06-18
Payer: MEDICARE

## 2018-06-18 VITALS
DIASTOLIC BLOOD PRESSURE: 80 MMHG | WEIGHT: 232.59 LBS | SYSTOLIC BLOOD PRESSURE: 130 MMHG | HEART RATE: 77 BPM | RESPIRATION RATE: 16 BRPM | BODY MASS INDEX: 42.54 KG/M2 | TEMPERATURE: 98.2 F | OXYGEN SATURATION: 98 %

## 2018-06-18 LAB
ALBUMIN SERPL ELPH-MCNC: 4.3 G/DL
ALP BLD-CCNC: 90 U/L
ALT SERPL-CCNC: 11 U/L
ANION GAP SERPL CALC-SCNC: 13 MMOL/L
APTT BLD: 47 SEC
AST SERPL-CCNC: 20 U/L
BASOPHILS # BLD AUTO: 0.1 K/UL — SIGNIFICANT CHANGE UP (ref 0–0.2)
BASOPHILS NFR BLD AUTO: 1.3 % — SIGNIFICANT CHANGE UP (ref 0–2)
BILIRUB SERPL-MCNC: 0.2 MG/DL
BUN SERPL-MCNC: 23 MG/DL
CALCIUM SERPL-MCNC: 9.4 MG/DL
CHLORIDE SERPL-SCNC: 97 MMOL/L
CO2 SERPL-SCNC: 29 MMOL/L
CREAT SERPL-MCNC: 0.79 MG/DL
EOSINOPHIL # BLD AUTO: 0.1 K/UL — SIGNIFICANT CHANGE UP (ref 0–0.5)
EOSINOPHIL NFR BLD AUTO: 2 % — SIGNIFICANT CHANGE UP (ref 0–6)
GLUCOSE SERPL-MCNC: 99 MG/DL
HCT VFR BLD CALC: 34.2 % — LOW (ref 34.5–45)
HGB BLD-MCNC: 11.6 G/DL — SIGNIFICANT CHANGE UP (ref 11.5–15.5)
INR PPP: 3.35 RATIO
LYMPHOCYTES # BLD AUTO: 1.7 K/UL — SIGNIFICANT CHANGE UP (ref 1–3.3)
LYMPHOCYTES # BLD AUTO: 29.4 % — SIGNIFICANT CHANGE UP (ref 13–44)
MCHC RBC-ENTMCNC: 23.1 PG — LOW (ref 27–34)
MCHC RBC-ENTMCNC: 33.7 G/DL — SIGNIFICANT CHANGE UP (ref 32–36)
MCV RBC AUTO: 68.6 FL — LOW (ref 80–100)
MONOCYTES # BLD AUTO: 0.6 K/UL — SIGNIFICANT CHANGE UP (ref 0–0.9)
MONOCYTES NFR BLD AUTO: 9.8 % — SIGNIFICANT CHANGE UP (ref 2–14)
NEUTROPHILS # BLD AUTO: 3.2 K/UL — SIGNIFICANT CHANGE UP (ref 1.8–7.4)
NEUTROPHILS NFR BLD AUTO: 57.5 % — SIGNIFICANT CHANGE UP (ref 43–77)
PLATELET # BLD AUTO: 330 K/UL — SIGNIFICANT CHANGE UP (ref 150–400)
POTASSIUM SERPL-SCNC: 3.4 MMOL/L
PROT SERPL-MCNC: 7.1 G/DL
PT BLD: 38.8 SEC
RBC # BLD: 4.99 M/UL — SIGNIFICANT CHANGE UP (ref 3.8–5.2)
RBC # FLD: 14.3 % — SIGNIFICANT CHANGE UP (ref 10.3–14.5)
SODIUM SERPL-SCNC: 139 MMOL/L
WBC # BLD: 5.6 K/UL — SIGNIFICANT CHANGE UP (ref 3.8–10.5)
WBC # FLD AUTO: 5.6 K/UL — SIGNIFICANT CHANGE UP (ref 3.8–10.5)

## 2018-06-18 PROCEDURE — 99215 OFFICE O/P EST HI 40 MIN: CPT

## 2018-06-19 ENCOUNTER — APPOINTMENT (OUTPATIENT)
Dept: VASCULAR SURGERY | Facility: CLINIC | Age: 73
End: 2018-06-19
Payer: MEDICARE

## 2018-06-19 VITALS — DIASTOLIC BLOOD PRESSURE: 77 MMHG | TEMPERATURE: 98 F | SYSTOLIC BLOOD PRESSURE: 117 MMHG | HEART RATE: 75 BPM

## 2018-06-19 DIAGNOSIS — Z98.890 OTHER SPECIFIED POSTPROCEDURAL STATES: ICD-10-CM

## 2018-06-19 DIAGNOSIS — Z86.39 PERSONAL HISTORY OF OTHER ENDOCRINE, NUTRITIONAL AND METABOLIC DISEASE: ICD-10-CM

## 2018-06-19 DIAGNOSIS — Z86.79 PERSONAL HISTORY OF OTHER DISEASES OF THE CIRCULATORY SYSTEM: ICD-10-CM

## 2018-06-19 DIAGNOSIS — Z87.19 OTHER SPECIFIED POSTPROCEDURAL STATES: ICD-10-CM

## 2018-06-19 LAB — PROT S AG ACT/NOR PPP IA: 29 %

## 2018-06-19 PROCEDURE — 99213 OFFICE O/P EST LOW 20 MIN: CPT

## 2018-06-19 PROCEDURE — 93970 EXTREMITY STUDY: CPT

## 2018-06-20 ENCOUNTER — OUTPATIENT (OUTPATIENT)
Dept: OUTPATIENT SERVICES | Facility: HOSPITAL | Age: 73
LOS: 1 days | End: 2018-06-20
Payer: MEDICARE

## 2018-06-20 VITALS
HEART RATE: 68 BPM | SYSTOLIC BLOOD PRESSURE: 143 MMHG | DIASTOLIC BLOOD PRESSURE: 85 MMHG | HEIGHT: 62 IN | TEMPERATURE: 98 F | WEIGHT: 225.97 LBS | OXYGEN SATURATION: 96 %

## 2018-06-20 DIAGNOSIS — M17.11 UNILATERAL PRIMARY OSTEOARTHRITIS, RIGHT KNEE: ICD-10-CM

## 2018-06-20 DIAGNOSIS — I83.93 ASYMPTOMATIC VARICOSE VEINS OF BILATERAL LOWER EXTREMITIES: Chronic | ICD-10-CM

## 2018-06-20 DIAGNOSIS — Z98.1 ARTHRODESIS STATUS: Chronic | ICD-10-CM

## 2018-06-20 DIAGNOSIS — Z98.89 OTHER SPECIFIED POSTPROCEDURAL STATES: Chronic | ICD-10-CM

## 2018-06-20 DIAGNOSIS — Z01.818 ENCOUNTER FOR OTHER PREPROCEDURAL EXAMINATION: ICD-10-CM

## 2018-06-20 DIAGNOSIS — Z90.49 ACQUIRED ABSENCE OF OTHER SPECIFIED PARTS OF DIGESTIVE TRACT: Chronic | ICD-10-CM

## 2018-06-20 DIAGNOSIS — Z98.890 OTHER SPECIFIED POSTPROCEDURAL STATES: Chronic | ICD-10-CM

## 2018-06-20 DIAGNOSIS — M25.561 PAIN IN RIGHT KNEE: ICD-10-CM

## 2018-06-20 DIAGNOSIS — Z09 ENCOUNTER FOR FOLLOW-UP EXAMINATION AFTER COMPLETED TREATMENT FOR CONDITIONS OTHER THAN MALIGNANT NEOPLASM: Chronic | ICD-10-CM

## 2018-06-20 LAB
ALBUMIN SERPL ELPH-MCNC: 3.6 G/DL — SIGNIFICANT CHANGE UP (ref 3.3–5)
ALP SERPL-CCNC: 99 U/L — SIGNIFICANT CHANGE UP (ref 30–120)
ALT FLD-CCNC: 19 U/L DA — SIGNIFICANT CHANGE UP (ref 10–60)
ANION GAP SERPL CALC-SCNC: 6 MMOL/L — SIGNIFICANT CHANGE UP (ref 5–17)
APTT BLD: 53.2 SEC — HIGH (ref 27.5–37.4)
AST SERPL-CCNC: 21 U/L — SIGNIFICANT CHANGE UP (ref 10–40)
BILIRUB SERPL-MCNC: 0.3 MG/DL — SIGNIFICANT CHANGE UP (ref 0.2–1.2)
BLD GP AB SCN SERPL QL: SIGNIFICANT CHANGE UP
BUN SERPL-MCNC: 18 MG/DL — SIGNIFICANT CHANGE UP (ref 7–23)
CALCIUM SERPL-MCNC: 9.4 MG/DL — SIGNIFICANT CHANGE UP (ref 8.4–10.5)
CHLORIDE SERPL-SCNC: 100 MMOL/L — SIGNIFICANT CHANGE UP (ref 96–108)
CO2 SERPL-SCNC: 33 MMOL/L — HIGH (ref 22–31)
CREAT SERPL-MCNC: 0.83 MG/DL — SIGNIFICANT CHANGE UP (ref 0.5–1.3)
GLUCOSE SERPL-MCNC: 104 MG/DL — HIGH (ref 70–99)
HCT VFR BLD CALC: 37.8 % — SIGNIFICANT CHANGE UP (ref 34.5–45)
HGB BLD-MCNC: 11.6 G/DL — SIGNIFICANT CHANGE UP (ref 11.5–15.5)
INR BLD: 3.29 RATIO — HIGH (ref 0.88–1.16)
MCHC RBC-ENTMCNC: 21.5 PG — LOW (ref 27–34)
MCHC RBC-ENTMCNC: 30.6 GM/DL — LOW (ref 32–36)
MCV RBC AUTO: 70.2 FL — LOW (ref 80–100)
PLATELET # BLD AUTO: 371 K/UL — SIGNIFICANT CHANGE UP (ref 150–400)
POTASSIUM SERPL-MCNC: 3.5 MMOL/L — SIGNIFICANT CHANGE UP (ref 3.5–5.3)
POTASSIUM SERPL-SCNC: 3.5 MMOL/L — SIGNIFICANT CHANGE UP (ref 3.5–5.3)
PROT SERPL-MCNC: 7.8 G/DL — SIGNIFICANT CHANGE UP (ref 6–8.3)
PROTHROM AB SERPL-ACNC: 36.7 SEC — HIGH (ref 9.8–12.7)
RBC # BLD: 5.39 M/UL — HIGH (ref 3.8–5.2)
RBC # FLD: 14.2 % — SIGNIFICANT CHANGE UP (ref 10.3–14.5)
SODIUM SERPL-SCNC: 139 MMOL/L — SIGNIFICANT CHANGE UP (ref 135–145)
WBC # BLD: 5.9 K/UL — SIGNIFICANT CHANGE UP (ref 3.8–10.5)
WBC # FLD AUTO: 5.9 K/UL — SIGNIFICANT CHANGE UP (ref 3.8–10.5)

## 2018-06-20 PROCEDURE — 93010 ELECTROCARDIOGRAM REPORT: CPT | Mod: NC

## 2018-06-20 NOTE — H&P PST ADULT - PAIN SCALE PREFERRED, PROFILE
Subjective:       Patient ID: Estephania Lyman is a 67 y.o. female.    Chief Complaint: Annual Exam    HPI   Pt is here for annual exam pt is generally well stable htn on norvasc no ankle swelling bp fine today  Pt has hypercholesterolemia stable on statin no muscle aches pt is c/o right shoulder pain x-ray with djd but pt stil having pain 5/10 despite prn aleve   Pt takes nothing on a schedule she would like to see ortho    Review of Systems   Constitutional: Negative for activity change, chills, diaphoresis, fatigue and fever.   HENT: Negative for congestion, ear discharge, ear pain, hearing loss, postnasal drip, rhinorrhea, sinus pressure, sneezing, sore throat, trouble swallowing and voice change.    Eyes: Negative for photophobia, discharge, redness, itching and visual disturbance.   Respiratory: Negative for cough, chest tightness, shortness of breath and wheezing.    Cardiovascular: Negative for chest pain, palpitations and leg swelling.   Gastrointestinal: Negative for abdominal pain, anal bleeding, blood in stool, constipation, diarrhea, nausea, rectal pain and vomiting.   Genitourinary: Negative for dyspareunia, dysuria, flank pain, frequency, hematuria, menstrual problem, pelvic pain, urgency, vaginal bleeding and vaginal discharge.   Musculoskeletal: Positive for arthralgias. Negative for back pain, joint swelling and neck pain.   Skin: Negative for color change and rash.   Neurological: Negative for dizziness, speech difficulty, weakness, light-headedness, numbness and headaches.   Hematological: Does not bruise/bleed easily.   Psychiatric/Behavioral: Negative for agitation, confusion, decreased concentration, sleep disturbance and suicidal ideas. The patient is not nervous/anxious.        Objective:      Physical Exam   Constitutional: She appears well-developed and well-nourished.   HENT:   Head: Normocephalic and atraumatic.   Right Ear: External ear normal.   Left Ear: External ear normal.  "  Nose: Nose normal.   Mouth/Throat: Oropharynx is clear and moist.   Eyes: Conjunctivae and EOM are normal. Pupils are equal, round, and reactive to light. Right eye exhibits no discharge. Left eye exhibits no discharge.   Neck: Normal range of motion. Neck supple. No thyromegaly present.   Cardiovascular: Normal rate, regular rhythm and intact distal pulses.  Exam reveals no gallop and no friction rub.    Pulmonary/Chest: Effort normal and breath sounds normal. She has no wheezes. She has no rales.   Abdominal: Soft. Bowel sounds are normal. She exhibits no distension. There is no tenderness. There is no rebound and no guarding.   Musculoskeletal: Normal range of motion. She exhibits no edema or tenderness.   Lymphadenopathy:     She has no cervical adenopathy.   Neurological: She is alert. No cranial nerve deficit. She exhibits normal muscle tone. Coordination normal.   Skin: Skin is warm and dry. No rash noted. No erythema.   Psychiatric: She has a normal mood and affect. Her behavior is normal. Judgment and thought content normal.       Assessment:       1. Annual physical exam    2. Need for pneumococcal vaccination    3. HTN (hypertension), benign    4. Hypercholesterolemia    5. Chronic right shoulder pain        Plan:     orders cmp cbc lipid tsh urine  Cont meds   low fat low salt diet   Graded exercise  rtc annually and 6 months    Health maintenance  Lipid ordered  Mammogram discussed  Colonoscopy discussed  Flu in fall  Tetanus q 10 years  Pneumonia discussed  "This note will not be shared with the patient."   " numerical 0-10

## 2018-06-20 NOTE — H&P PST ADULT - HISTORY OF PRESENT ILLNESS
71 yo female is scheduled for "RIGHT total knee replacement" on 7/9/18 with Britni Do MD.  Patient reports longstanding right knee pain, worsening over last 5 years, for which she has tried cortisone and HA injections without relief.  Right knee pain, worst at night, rates 10/10.

## 2018-06-20 NOTE — H&P PST ADULT - PSH
[place] filter in place  2005  H/O eye surgery  left retinal tear, 2012  History of ankle fusion  right, 2017  History of appendectomy  2015  History of appendectomy    History of hernia surgery  2013  S/P hip replacement  bilateral, 2008, 2009  S/P hysterectomy  1999  Varicose vein of leg  cauterization left leg vein 1989

## 2018-06-20 NOTE — H&P PST ADULT - PROBLEM SELECTOR PLAN 1
"RIGHT total knee replacement" on 7/9/18  Pre op instructions were reviewed and signed  Patient will obtain medical, cardiac, hematology and vascular clearances  Patient will obtain instructions for bridging coumadin to lovanox  Pharmacy consult today

## 2018-06-20 NOTE — H&P PST ADULT - NEGATIVE ENMT SYMPTOMS
no tinnitus/no gum bleeding/no sinus symptoms/no nasal congestion/no throat pain/no nasal obstruction/no post-nasal discharge/no nose bleeds/no abnormal taste sensation/no recurrent cold sores/no vertigo/no nasal discharge/no dysphagia/no hearing difficulty/no dry mouth/no ear pain

## 2018-06-21 ENCOUNTER — RECORD ABSTRACTING (OUTPATIENT)
Age: 73
End: 2018-06-21

## 2018-06-21 DIAGNOSIS — O16.9 UNSPECIFIED MATERNAL HYPERTENSION, UNSPECIFIED TRIMESTER: ICD-10-CM

## 2018-06-21 DIAGNOSIS — Z87.39 PERSONAL HISTORY OF OTHER DISEASES OF THE MUSCULOSKELETAL SYSTEM AND CONNECTIVE TISSUE: ICD-10-CM

## 2018-06-21 DIAGNOSIS — Z86.79 PERSONAL HISTORY OF OTHER DISEASES OF THE CIRCULATORY SYSTEM: ICD-10-CM

## 2018-06-21 LAB
MRSA PCR RESULT.: SIGNIFICANT CHANGE UP
S AUREUS DNA NOSE QL NAA+PROBE: SIGNIFICANT CHANGE UP

## 2018-06-21 RX ORDER — ALBUTEROL SULFATE 90 UG/1
108 (90 BASE) AEROSOL, METERED RESPIRATORY (INHALATION)
Refills: 0 | Status: ACTIVE | COMMUNITY

## 2018-06-27 ENCOUNTER — MEDICATION RENEWAL (OUTPATIENT)
Age: 73
End: 2018-06-27

## 2018-06-29 RX ORDER — APREPITANT 80 MG/1
40 CAPSULE ORAL ONCE
Qty: 0 | Refills: 0 | Status: COMPLETED | OUTPATIENT
Start: 2018-07-09 | End: 2018-07-09

## 2018-06-29 RX ORDER — CHLORHEXIDINE GLUCONATE 213 G/1000ML
1 SOLUTION TOPICAL ONCE
Qty: 0 | Refills: 0 | Status: COMPLETED | OUTPATIENT
Start: 2018-07-09 | End: 2018-07-09

## 2018-06-29 RX ORDER — SODIUM CHLORIDE 9 MG/ML
1000 INJECTION, SOLUTION INTRAVENOUS
Qty: 0 | Refills: 0 | Status: DISCONTINUED | OUTPATIENT
Start: 2018-07-09 | End: 2018-07-11

## 2018-07-02 ENCOUNTER — APPOINTMENT (OUTPATIENT)
Dept: PULMONOLOGY | Facility: CLINIC | Age: 73
End: 2018-07-02
Payer: MEDICARE

## 2018-07-02 VITALS
OXYGEN SATURATION: 96 % | DIASTOLIC BLOOD PRESSURE: 73 MMHG | RESPIRATION RATE: 16 BRPM | HEART RATE: 73 BPM | TEMPERATURE: 98 F | HEIGHT: 62 IN | BODY MASS INDEX: 42.69 KG/M2 | SYSTOLIC BLOOD PRESSURE: 122 MMHG | WEIGHT: 232 LBS

## 2018-07-02 DIAGNOSIS — M15.9 POLYOSTEOARTHRITIS, UNSPECIFIED: ICD-10-CM

## 2018-07-02 DIAGNOSIS — M17.12 UNILATERAL PRIMARY OSTEOARTHRITIS, LEFT KNEE: ICD-10-CM

## 2018-07-02 DIAGNOSIS — J45.20 MILD INTERMITTENT ASTHMA, UNCOMPLICATED: ICD-10-CM

## 2018-07-02 LAB — INR PPP: 2.6 RATIO

## 2018-07-02 PROCEDURE — 99214 OFFICE O/P EST MOD 30 MIN: CPT | Mod: 25

## 2018-07-02 PROCEDURE — 85610 PROTHROMBIN TIME: CPT | Mod: QW

## 2018-07-02 PROCEDURE — 94060 EVALUATION OF WHEEZING: CPT

## 2018-07-02 RX ORDER — LOSARTAN POTASSIUM AND HYDROCHLOROTHIAZIDE 25; 100 MG/1; MG/1
100-25 TABLET ORAL DAILY
Refills: 0 | Status: DISCONTINUED | COMMUNITY
End: 2018-07-02

## 2018-07-02 RX ORDER — OMEPRAZOLE 20 MG/1
20 CAPSULE, DELAYED RELEASE ORAL DAILY
Refills: 0 | Status: DISCONTINUED | COMMUNITY
Start: 2018-05-01 | End: 2018-07-02

## 2018-07-02 RX ORDER — TOBRAMYCIN 3 MG/ML
0.3 SOLUTION/ DROPS OPHTHALMIC
Qty: 15 | Refills: 0 | Status: COMPLETED | COMMUNITY
Start: 2018-02-27 | End: 2018-07-02

## 2018-07-02 RX ORDER — FLUTICASONE PROPIONATE AND SALMETEROL 50; 100 UG/1; UG/1
100-50 POWDER RESPIRATORY (INHALATION)
Refills: 0 | Status: DISCONTINUED | COMMUNITY
End: 2018-07-02

## 2018-07-02 NOTE — PHYSICAL EXAM

## 2018-07-02 NOTE — ASSESSMENT
[Patient Optimized for Surgery] : Patient optimized for surgery [No Further Testing Recommended] : no further testing recommended [Modify anticoagulant treatment prior to procedure] : Modify anticoagulant treatment prior to procedure [Modify medications prior to procedure] : Modify medications prior to procedure [FreeTextEntry4] : Preop labs reviewed-unremarkable\par Spirometry-normal [FreeTextEntry5] : stop coumadin 7/4; start lovenox 100 on 7/4; last dose am 7/7 PM dose [FreeTextEntry7] : cordell holt AM of surgery

## 2018-07-02 NOTE — HISTORY OF PRESENT ILLNESS
[Asthma] : asthma [Hypertension] : ~T hypertension [Chronic Kidney Disease] : chronic kidney disease [DVT/PE] : deep vein thrombosis/pulmonary embolism [( Patient denies any chest pain, claudication, dyspnea on exertion, orthopnea, palpitations or syncope )] : Patient denies any chest pain, claudication, dyspnea on exertion, orthopnea, palpitations or syncope. [Anticoagulants: _____] : Anticoagulants: [unfilled] [Diabetes] : no diabetes [Sleep Apnea] : no sleep apnea [COPD] : no COPD [FreeTextEntry1] : PURVI MATHEW [FreeTextEntry2] : 7/9/2018 [FreeTextEntry3] : Britni Do [FreeTextEntry4] : Knee pain, osteoarthritis [FreeTextEntry7] : Echo 5/29/2018-normal\par EKG 6/20 1st degree AV block\par Eval by vascular-cleared IVC filter in place

## 2018-07-05 ENCOUNTER — OTHER (OUTPATIENT)
Age: 73
End: 2018-07-05

## 2018-07-06 RX ORDER — POLYETHYLENE GLYCOL 3350 17 G/17G
17 POWDER, FOR SOLUTION ORAL DAILY
Qty: 0 | Refills: 0 | Status: DISCONTINUED | OUTPATIENT
Start: 2018-07-09 | End: 2018-07-12

## 2018-07-06 RX ORDER — DOCUSATE SODIUM 100 MG
100 CAPSULE ORAL THREE TIMES A DAY
Qty: 0 | Refills: 0 | Status: DISCONTINUED | OUTPATIENT
Start: 2018-07-09 | End: 2018-07-12

## 2018-07-06 RX ORDER — PANTOPRAZOLE SODIUM 20 MG/1
40 TABLET, DELAYED RELEASE ORAL DAILY
Qty: 0 | Refills: 0 | Status: DISCONTINUED | OUTPATIENT
Start: 2018-07-09 | End: 2018-07-12

## 2018-07-06 RX ORDER — ONDANSETRON 8 MG/1
4 TABLET, FILM COATED ORAL EVERY 6 HOURS
Qty: 0 | Refills: 0 | Status: DISCONTINUED | OUTPATIENT
Start: 2018-07-09 | End: 2018-07-12

## 2018-07-06 RX ORDER — MAGNESIUM HYDROXIDE 400 MG/1
30 TABLET, CHEWABLE ORAL DAILY
Qty: 0 | Refills: 0 | Status: DISCONTINUED | OUTPATIENT
Start: 2018-07-09 | End: 2018-07-12

## 2018-07-06 RX ORDER — SENNA PLUS 8.6 MG/1
2 TABLET ORAL AT BEDTIME
Qty: 0 | Refills: 0 | Status: DISCONTINUED | OUTPATIENT
Start: 2018-07-09 | End: 2018-07-12

## 2018-07-06 RX ORDER — SODIUM CHLORIDE 9 MG/ML
1000 INJECTION, SOLUTION INTRAVENOUS
Qty: 0 | Refills: 0 | Status: DISCONTINUED | OUTPATIENT
Start: 2018-07-09 | End: 2018-07-11

## 2018-07-09 ENCOUNTER — APPOINTMENT (OUTPATIENT)
Dept: ORTHOPEDIC SURGERY | Facility: HOSPITAL | Age: 73
End: 2018-07-09

## 2018-07-09 ENCOUNTER — INPATIENT (INPATIENT)
Facility: HOSPITAL | Age: 73
LOS: 2 days | Discharge: NURSING, REHAB OR LONG-TERM CARE | DRG: 470 | End: 2018-07-12
Attending: ORTHOPAEDIC SURGERY | Admitting: ORTHOPAEDIC SURGERY
Payer: MEDICARE

## 2018-07-09 ENCOUNTER — TRANSCRIPTION ENCOUNTER (OUTPATIENT)
Age: 73
End: 2018-07-09

## 2018-07-09 ENCOUNTER — RESULT REVIEW (OUTPATIENT)
Age: 73
End: 2018-07-09

## 2018-07-09 VITALS
WEIGHT: 229.94 LBS | SYSTOLIC BLOOD PRESSURE: 143 MMHG | DIASTOLIC BLOOD PRESSURE: 71 MMHG | HEIGHT: 62 IN | OXYGEN SATURATION: 99 % | HEART RATE: 66 BPM | RESPIRATION RATE: 18 BRPM | TEMPERATURE: 98 F

## 2018-07-09 DIAGNOSIS — Z96.651 PRESENCE OF RIGHT ARTIFICIAL KNEE JOINT: Chronic | ICD-10-CM

## 2018-07-09 DIAGNOSIS — J45.909 UNSPECIFIED ASTHMA, UNCOMPLICATED: ICD-10-CM

## 2018-07-09 DIAGNOSIS — Z90.49 ACQUIRED ABSENCE OF OTHER SPECIFIED PARTS OF DIGESTIVE TRACT: Chronic | ICD-10-CM

## 2018-07-09 DIAGNOSIS — Z01.818 ENCOUNTER FOR OTHER PREPROCEDURAL EXAMINATION: ICD-10-CM

## 2018-07-09 DIAGNOSIS — Z47.1 AFTERCARE FOLLOWING JOINT REPLACEMENT SURGERY: ICD-10-CM

## 2018-07-09 DIAGNOSIS — Z98.89 OTHER SPECIFIED POSTPROCEDURAL STATES: Chronic | ICD-10-CM

## 2018-07-09 DIAGNOSIS — Z86.711 PERSONAL HISTORY OF PULMONARY EMBOLISM: ICD-10-CM

## 2018-07-09 DIAGNOSIS — Z98.1 ARTHRODESIS STATUS: Chronic | ICD-10-CM

## 2018-07-09 DIAGNOSIS — M25.561 PAIN IN RIGHT KNEE: ICD-10-CM

## 2018-07-09 DIAGNOSIS — I83.93 ASYMPTOMATIC VARICOSE VEINS OF BILATERAL LOWER EXTREMITIES: Chronic | ICD-10-CM

## 2018-07-09 DIAGNOSIS — Z86.718 PERSONAL HISTORY OF OTHER VENOUS THROMBOSIS AND EMBOLISM: ICD-10-CM

## 2018-07-09 DIAGNOSIS — Z98.890 OTHER SPECIFIED POSTPROCEDURAL STATES: Chronic | ICD-10-CM

## 2018-07-09 DIAGNOSIS — I10 ESSENTIAL (PRIMARY) HYPERTENSION: ICD-10-CM

## 2018-07-09 LAB
ABO RH CONFIRMATION: SIGNIFICANT CHANGE UP
ANION GAP SERPL CALC-SCNC: 10 MMOL/L — SIGNIFICANT CHANGE UP (ref 5–17)
APTT BLD: 27.7 SEC — SIGNIFICANT CHANGE UP (ref 27.5–37.4)
BUN SERPL-MCNC: 11 MG/DL — SIGNIFICANT CHANGE UP (ref 7–23)
CALCIUM SERPL-MCNC: 9 MG/DL — SIGNIFICANT CHANGE UP (ref 8.4–10.5)
CHLORIDE SERPL-SCNC: 99 MMOL/L — SIGNIFICANT CHANGE UP (ref 96–108)
CO2 SERPL-SCNC: 27 MMOL/L — SIGNIFICANT CHANGE UP (ref 22–31)
CREAT SERPL-MCNC: 0.92 MG/DL — SIGNIFICANT CHANGE UP (ref 0.5–1.3)
GLUCOSE SERPL-MCNC: 226 MG/DL — HIGH (ref 70–99)
HCT VFR BLD CALC: 35.3 % — SIGNIFICANT CHANGE UP (ref 34.5–45)
HGB BLD-MCNC: 11 G/DL — LOW (ref 11.5–15.5)
INR BLD: 1.16 RATIO — SIGNIFICANT CHANGE UP (ref 0.88–1.16)
POTASSIUM SERPL-MCNC: 3.4 MMOL/L — LOW (ref 3.5–5.3)
POTASSIUM SERPL-SCNC: 3.4 MMOL/L — LOW (ref 3.5–5.3)
PROTHROM AB SERPL-ACNC: 12.7 SEC — SIGNIFICANT CHANGE UP (ref 9.8–12.7)
SODIUM SERPL-SCNC: 136 MMOL/L — SIGNIFICANT CHANGE UP (ref 135–145)

## 2018-07-09 PROCEDURE — 12345: CPT

## 2018-07-09 PROCEDURE — 73562 X-RAY EXAM OF KNEE 3: CPT | Mod: 26,RT

## 2018-07-09 PROCEDURE — 87640 STAPH A DNA AMP PROBE: CPT

## 2018-07-09 PROCEDURE — 85730 THROMBOPLASTIN TIME PARTIAL: CPT

## 2018-07-09 PROCEDURE — 88311 DECALCIFY TISSUE: CPT | Mod: 26

## 2018-07-09 PROCEDURE — 36415 COLL VENOUS BLD VENIPUNCTURE: CPT

## 2018-07-09 PROCEDURE — 88305 TISSUE EXAM BY PATHOLOGIST: CPT | Mod: 26

## 2018-07-09 PROCEDURE — 80053 COMPREHEN METABOLIC PANEL: CPT

## 2018-07-09 PROCEDURE — 85610 PROTHROMBIN TIME: CPT

## 2018-07-09 PROCEDURE — 85027 COMPLETE CBC AUTOMATED: CPT

## 2018-07-09 PROCEDURE — G0463: CPT

## 2018-07-09 PROCEDURE — 93005 ELECTROCARDIOGRAM TRACING: CPT

## 2018-07-09 PROCEDURE — 87641 MR-STAPH DNA AMP PROBE: CPT

## 2018-07-09 PROCEDURE — 86901 BLOOD TYPING SEROLOGIC RH(D): CPT

## 2018-07-09 PROCEDURE — 99223 1ST HOSP IP/OBS HIGH 75: CPT

## 2018-07-09 PROCEDURE — 86850 RBC ANTIBODY SCREEN: CPT

## 2018-07-09 PROCEDURE — 27447 TOTAL KNEE ARTHROPLASTY: CPT | Mod: RT

## 2018-07-09 PROCEDURE — 86900 BLOOD TYPING SEROLOGIC ABO: CPT

## 2018-07-09 RX ORDER — AMLODIPINE BESYLATE 2.5 MG/1
5 TABLET ORAL DAILY
Qty: 0 | Refills: 0 | Status: DISCONTINUED | OUTPATIENT
Start: 2018-07-09 | End: 2018-07-09

## 2018-07-09 RX ORDER — CELECOXIB 200 MG/1
1 CAPSULE ORAL
Qty: 0 | Refills: 0 | COMMUNITY
Start: 2018-07-09

## 2018-07-09 RX ORDER — ACETAMINOPHEN 500 MG
2 TABLET ORAL
Qty: 0 | Refills: 0 | COMMUNITY
Start: 2018-07-09 | End: 2018-07-23

## 2018-07-09 RX ORDER — ACETAMINOPHEN 500 MG
1000 TABLET ORAL ONCE
Qty: 0 | Refills: 0 | Status: COMPLETED | OUTPATIENT
Start: 2018-07-09 | End: 2018-07-09

## 2018-07-09 RX ORDER — CEFAZOLIN SODIUM 1 G
2000 VIAL (EA) INJECTION ONCE
Qty: 0 | Refills: 0 | Status: COMPLETED | OUTPATIENT
Start: 2018-07-09 | End: 2018-07-09

## 2018-07-09 RX ORDER — CELECOXIB 200 MG/1
200 CAPSULE ORAL
Qty: 0 | Refills: 0 | Status: DISCONTINUED | OUTPATIENT
Start: 2018-07-10 | End: 2018-07-11

## 2018-07-09 RX ORDER — HEPARIN SODIUM 5000 [USP'U]/ML
INJECTION INTRAVENOUS; SUBCUTANEOUS
Qty: 25000 | Refills: 0 | Status: DISCONTINUED | OUTPATIENT
Start: 2018-07-10 | End: 2018-07-12

## 2018-07-09 RX ORDER — SODIUM CHLORIDE 9 MG/ML
1000 INJECTION, SOLUTION INTRAVENOUS
Qty: 0 | Refills: 0 | Status: DISCONTINUED | OUTPATIENT
Start: 2018-07-09 | End: 2018-07-09

## 2018-07-09 RX ORDER — ALBUTEROL 90 UG/1
2 AEROSOL, METERED ORAL EVERY 6 HOURS
Qty: 0 | Refills: 0 | Status: DISCONTINUED | OUTPATIENT
Start: 2018-07-09 | End: 2018-07-12

## 2018-07-09 RX ORDER — ACETAMINOPHEN 500 MG
1000 TABLET ORAL EVERY 6 HOURS
Qty: 0 | Refills: 0 | Status: COMPLETED | OUTPATIENT
Start: 2018-07-09 | End: 2018-07-10

## 2018-07-09 RX ORDER — DOCUSATE SODIUM 100 MG
1 CAPSULE ORAL
Qty: 0 | Refills: 0 | DISCHARGE
Start: 2018-07-09

## 2018-07-09 RX ORDER — ACETAMINOPHEN 500 MG
1000 TABLET ORAL EVERY 8 HOURS
Qty: 0 | Refills: 0 | Status: DISCONTINUED | OUTPATIENT
Start: 2018-07-10 | End: 2018-07-11

## 2018-07-09 RX ORDER — PANTOPRAZOLE SODIUM 20 MG/1
1 TABLET, DELAYED RELEASE ORAL
Qty: 0 | Refills: 0 | DISCHARGE
Start: 2018-07-09

## 2018-07-09 RX ORDER — OXYCODONE HYDROCHLORIDE 5 MG/1
10 TABLET ORAL
Qty: 0 | Refills: 0 | Status: DISCONTINUED | OUTPATIENT
Start: 2018-07-09 | End: 2018-07-12

## 2018-07-09 RX ORDER — CELECOXIB 200 MG/1
1 CAPSULE ORAL
Qty: 0 | Refills: 0 | DISCHARGE
Start: 2018-07-09

## 2018-07-09 RX ORDER — FAMOTIDINE 10 MG/ML
1 INJECTION INTRAVENOUS
Qty: 0 | Refills: 0 | COMMUNITY

## 2018-07-09 RX ORDER — SENNA PLUS 8.6 MG/1
2 TABLET ORAL
Qty: 0 | Refills: 0 | DISCHARGE
Start: 2018-07-09

## 2018-07-09 RX ORDER — OXYCODONE HYDROCHLORIDE 5 MG/1
5 TABLET ORAL
Qty: 0 | Refills: 0 | Status: DISCONTINUED | OUTPATIENT
Start: 2018-07-09 | End: 2018-07-12

## 2018-07-09 RX ORDER — POLYETHYLENE GLYCOL 3350 17 G/17G
17 POWDER, FOR SOLUTION ORAL
Qty: 0 | Refills: 0 | DISCHARGE
Start: 2018-07-09

## 2018-07-09 RX ORDER — OXYCODONE HYDROCHLORIDE 5 MG/1
5 TABLET ORAL EVERY 4 HOURS
Qty: 0 | Refills: 0 | Status: DISCONTINUED | OUTPATIENT
Start: 2018-07-09 | End: 2018-07-09

## 2018-07-09 RX ORDER — AMLODIPINE BESYLATE 2.5 MG/1
5 TABLET ORAL
Qty: 0 | Refills: 0 | Status: DISCONTINUED | OUTPATIENT
Start: 2018-07-09 | End: 2018-07-12

## 2018-07-09 RX ORDER — WARFARIN SODIUM 2.5 MG/1
2.5 TABLET ORAL AT BEDTIME
Qty: 0 | Refills: 0 | Status: DISCONTINUED | OUTPATIENT
Start: 2018-07-09 | End: 2018-07-09

## 2018-07-09 RX ORDER — ENOXAPARIN SODIUM 100 MG/ML
100 INJECTION SUBCUTANEOUS EVERY 12 HOURS
Qty: 0 | Refills: 0 | Status: DISCONTINUED | OUTPATIENT
Start: 2018-07-11 | End: 2018-07-12

## 2018-07-09 RX ORDER — HYDROMORPHONE HYDROCHLORIDE 2 MG/ML
0.5 INJECTION INTRAMUSCULAR; INTRAVENOUS; SUBCUTANEOUS
Qty: 0 | Refills: 0 | Status: DISCONTINUED | OUTPATIENT
Start: 2018-07-09 | End: 2018-07-09

## 2018-07-09 RX ORDER — CEFAZOLIN SODIUM 1 G
2000 VIAL (EA) INJECTION EVERY 8 HOURS
Qty: 0 | Refills: 0 | Status: COMPLETED | OUTPATIENT
Start: 2018-07-09 | End: 2018-07-10

## 2018-07-09 RX ORDER — BUDESONIDE AND FORMOTEROL FUMARATE DIHYDRATE 160; 4.5 UG/1; UG/1
2 AEROSOL RESPIRATORY (INHALATION)
Qty: 0 | Refills: 0 | Status: DISCONTINUED | OUTPATIENT
Start: 2018-07-09 | End: 2018-07-12

## 2018-07-09 RX ORDER — HYDROMORPHONE HYDROCHLORIDE 2 MG/ML
0.5 INJECTION INTRAMUSCULAR; INTRAVENOUS; SUBCUTANEOUS
Qty: 0 | Refills: 0 | Status: DISCONTINUED | OUTPATIENT
Start: 2018-07-09 | End: 2018-07-12

## 2018-07-09 RX ORDER — WARFARIN SODIUM 2.5 MG/1
5 TABLET ORAL ONCE
Qty: 0 | Refills: 0 | Status: COMPLETED | OUTPATIENT
Start: 2018-07-11 | End: 2018-07-11

## 2018-07-09 RX ORDER — ONDANSETRON 8 MG/1
4 TABLET, FILM COATED ORAL ONCE
Qty: 0 | Refills: 0 | Status: DISCONTINUED | OUTPATIENT
Start: 2018-07-09 | End: 2018-07-09

## 2018-07-09 RX ORDER — POTASSIUM CHLORIDE 20 MEQ
40 PACKET (EA) ORAL ONCE
Qty: 0 | Refills: 0 | Status: COMPLETED | OUTPATIENT
Start: 2018-07-09 | End: 2018-07-09

## 2018-07-09 RX ADMIN — AMLODIPINE BESYLATE 5 MILLIGRAM(S): 2.5 TABLET ORAL at 19:14

## 2018-07-09 RX ADMIN — Medication 150 MILLIGRAM(S): at 17:35

## 2018-07-09 RX ADMIN — SODIUM CHLORIDE 100 MILLILITER(S): 9 INJECTION, SOLUTION INTRAVENOUS at 15:11

## 2018-07-09 RX ADMIN — Medication 400 MILLIGRAM(S): at 21:03

## 2018-07-09 RX ADMIN — Medication 1000 MILLIGRAM(S): at 21:03

## 2018-07-09 RX ADMIN — Medication 100 MILLIGRAM(S): at 20:56

## 2018-07-09 RX ADMIN — HYDROMORPHONE HYDROCHLORIDE 0.5 MILLIGRAM(S): 2 INJECTION INTRAMUSCULAR; INTRAVENOUS; SUBCUTANEOUS at 13:20

## 2018-07-09 RX ADMIN — Medication 100 MILLIGRAM(S): at 17:08

## 2018-07-09 RX ADMIN — SODIUM CHLORIDE 100 MILLILITER(S): 9 INJECTION, SOLUTION INTRAVENOUS at 11:25

## 2018-07-09 RX ADMIN — Medication 1000 MILLIGRAM(S): at 17:31

## 2018-07-09 RX ADMIN — CHLORHEXIDINE GLUCONATE 1 APPLICATION(S): 213 SOLUTION TOPICAL at 07:06

## 2018-07-09 RX ADMIN — Medication 100 MILLIGRAM(S): at 15:10

## 2018-07-09 RX ADMIN — APREPITANT 40 MILLIGRAM(S): 80 CAPSULE ORAL at 07:20

## 2018-07-09 RX ADMIN — Medication 400 MILLIGRAM(S): at 16:44

## 2018-07-09 RX ADMIN — Medication 40 MILLIEQUIVALENT(S): at 20:57

## 2018-07-09 RX ADMIN — BUDESONIDE AND FORMOTEROL FUMARATE DIHYDRATE 2 PUFF(S): 160; 4.5 AEROSOL RESPIRATORY (INHALATION) at 18:51

## 2018-07-09 RX ADMIN — SENNA PLUS 2 TABLET(S): 8.6 TABLET ORAL at 20:56

## 2018-07-09 RX ADMIN — SODIUM CHLORIDE 75 MILLILITER(S): 9 INJECTION, SOLUTION INTRAVENOUS at 07:06

## 2018-07-09 RX ADMIN — HYDROMORPHONE HYDROCHLORIDE 0.5 MILLIGRAM(S): 2 INJECTION INTRAMUSCULAR; INTRAVENOUS; SUBCUTANEOUS at 13:05

## 2018-07-09 NOTE — CONSULT NOTE ADULT - SUBJECTIVE AND OBJECTIVE BOX
Patient is a 72y old  Female who presents with a chief complaint of pain right knee (09 Jul 2018 06:16)      HPI:  Patient is seen and examined.    No new complains.  PAST MEDICAL & SURGICAL HISTORY:  Obesity, morbid, BMI 40.0-49.9  Osteoarthritis of right knee  [place] filter in place: 2005  Lung nodule  Polymyalgia rheumatica  Swollen ankles  History of pulmonary embolism: 2004  History of DVT (deep vein thrombosis): 2005, 2014, 2015  Neuropathy: right foot  Spondylisthesis  Thalassemia, beta  Osteoarthritis  Flat feet, bilateral  PE (pulmonary embolism): s/p [place] Filter- 2005  Asthma: well controlled, no recent hospitalizations  HTN (hypertension)  History of ankle fusion: right, 2017  History of appendectomy: 2015  History of hernia surgery: 2013  H/O eye surgery: left retinal tear, 2012  Varicose vein of leg: cauterization left leg vein 1989  History of appendectomy  [place] filter in place: 2005  S/P hip replacement: bilateral, 2008, 2009  S/P hysterectomy: 1999      MEDICATIONS  (STANDING):  acetaminophen  IVPB. 1000 milliGRAM(s) IV Intermittent every 6 hours  amLODIPine   Tablet 5 milliGRAM(s) Oral daily  buDESOnide  80 MICROgram(s)/formoterol 4.5 MICROgram(s) Inhaler 2 Puff(s) Inhalation two times a day  ceFAZolin   IVPB 2000 milliGRAM(s) IV Intermittent every 8 hours  docusate sodium 100 milliGRAM(s) Oral three times a day  lactated ringers. 1000 milliLiter(s) (75 mL/Hr) IV Continuous <Continuous>  lactated ringers. 1000 milliLiter(s) (100 mL/Hr) IV Continuous <Continuous>  pantoprazole    Tablet 40 milliGRAM(s) Oral daily  pregabalin 150 milliGRAM(s) Oral two times a day  senna 2 Tablet(s) Oral at bedtime  warfarin 2.5 milliGRAM(s) Oral at bedtime    MEDICATIONS  (PRN):  ALBUTerol    90 MICROgram(s) HFA Inhaler 2 Puff(s) Inhalation every 6 hours PRN Shortness of Breath and/or Wheezing  aluminum hydroxide/magnesium hydroxide/simethicone Suspension 30 milliLiter(s) Oral four times a day PRN Indigestion  HYDROmorphone  Injectable 0.5 milliGRAM(s) IV Push every 3 hours PRN Severe Pain (7 - 10)  magnesium hydroxide Suspension 30 milliLiter(s) Oral daily PRN Constipation  ondansetron Injectable 4 milliGRAM(s) IV Push every 6 hours PRN Nausea and/or Vomiting  oxyCODONE    IR 5 milliGRAM(s) Oral every 3 hours PRN Mild Pain (1 - 3)  oxyCODONE    IR 10 milliGRAM(s) Oral every 3 hours PRN Moderate Pain (4 - 6)  polyethylene glycol 3350 17 Gram(s) Oral daily PRN Constipation      Allergies    Iodides (Short breath)    Intolerances    SOCIAL HISTORY:  Smoker:   NO        PACK YEARS:                         WHEN QUIT?  ETOH use:  NO               FREQUENCY / QUANTITY:  Ilicit Drug use:  NO        FAMILY HISTORY:  Family history of MI (myocardial infarction) (Father)      Vital Signs Last 24 Hrs  T(C): 36.4 (09 Jul 2018 11:00), Max: 36.5 (09 Jul 2018 06:15)  T(F): 97.6 (09 Jul 2018 11:00), Max: 97.7 (09 Jul 2018 06:15)  HR: 60 (09 Jul 2018 13:38) (60 - 78)  BP: 124/77 (09 Jul 2018 13:38) (114/61 - 143/71)  BP(mean): --  RR: 16 (09 Jul 2018 13:38) (11 - 18)  SpO2: 100% (09 Jul 2018 13:38) (97% - 100%)            LABS:          PT/INR - ( 09 Jul 2018 06:55 )   PT: 12.7 sec;   INR: 1.16 ratio         PTT - ( 09 Jul 2018 06:55 )  PTT:27.7 sec    CAPILLARY BLOOD GLUCOSE Patient is a 72y old  Female who presents with a chief complaint of pain right knee (09 Jul 2018 06:16) for long standing, increase with walking,      HPI:  Patient is seen and examined.    No new complains.  PAST MEDICAL & SURGICAL HISTORY:  Obesity, morbid, BMI 40.0-49.9  Osteoarthritis of right knee  D[place] filter in place: 2005  Lung nodule  Polymyalgia rheumatica  Swollen ankles  History of pulmonary embolism: 2004  History of DVT (deep vein thrombosis): 2005, 2014, 2015  Neuropathy: right foot  Spondylisthesis  Thalassemia, beta  Osteoarthritis  Flat feet, bilateral  PE (pulmonary embolism): s/p [place] Filter- 2005  Asthma: well controlled, no recent hospitalizations  HTN (hypertension)  History of ankle fusion: right, 2017  History of appendectomy: 2015  History of hernia surgery: 2013  H/O eye surgery: left retinal tear, 2012  Varicose vein of leg: cauterization left leg vein 1989  History of appendectomy  [place] filter in place: 2005  S/P hip replacement: bilateral, 2008, 2009  S/P hysterectomy: 1999      MEDICATIONS  (STANDING):  acetaminophen  IVPB. 1000 milliGRAM(s) IV Intermittent every 6 hours  amLODIPine   Tablet 5 milliGRAM(s) Oral daily  buDESOnide  80 MICROgram(s)/formoterol 4.5 MICROgram(s) Inhaler 2 Puff(s) Inhalation two times a day  ceFAZolin   IVPB 2000 milliGRAM(s) IV Intermittent every 8 hours  docusate sodium 100 milliGRAM(s) Oral three times a day  lactated ringers. 1000 milliLiter(s) (75 mL/Hr) IV Continuous <Continuous>  lactated ringers. 1000 milliLiter(s) (100 mL/Hr) IV Continuous <Continuous>  pantoprazole    Tablet 40 milliGRAM(s) Oral daily  pregabalin 150 milliGRAM(s) Oral two times a day  senna 2 Tablet(s) Oral at bedtime  warfarin 2.5 milliGRAM(s) Oral at bedtime    MEDICATIONS  (PRN):  ALBUTerol    90 MICROgram(s) HFA Inhaler 2 Puff(s) Inhalation every 6 hours PRN Shortness of Breath and/or Wheezing  aluminum hydroxide/magnesium hydroxide/simethicone Suspension 30 milliLiter(s) Oral four times a day PRN Indigestion  HYDROmorphone  Injectable 0.5 milliGRAM(s) IV Push every 3 hours PRN Severe Pain (7 - 10)  magnesium hydroxide Suspension 30 milliLiter(s) Oral daily PRN Constipation  ondansetron Injectable 4 milliGRAM(s) IV Push every 6 hours PRN Nausea and/or Vomiting  oxyCODONE    IR 5 milliGRAM(s) Oral every 3 hours PRN Mild Pain (1 - 3)  oxyCODONE    IR 10 milliGRAM(s) Oral every 3 hours PRN Moderate Pain (4 - 6)  polyethylene glycol 3350 17 Gram(s) Oral daily PRN Constipation      Allergies    Iodides (Short breath)    Intolerances    SOCIAL HISTORY:  Smoker:   NO        PACK YEARS:                         WHEN QUIT?  ETOH use:  NO               FREQUENCY / QUANTITY:  Ilicit Drug use:  NO        FAMILY HISTORY:  Family history of MI (myocardial infarction) (Father)      Vital Signs Last 24 Hrs  T(C): 36.4 (09 Jul 2018 11:00), Max: 36.5 (09 Jul 2018 06:15)  T(F): 97.6 (09 Jul 2018 11:00), Max: 97.7 (09 Jul 2018 06:15)  HR: 60 (09 Jul 2018 13:38) (60 - 78)  BP: 124/77 (09 Jul 2018 13:38) (114/61 - 143/71)  BP(mean): --  RR: 16 (09 Jul 2018 13:38) (11 - 18)  SpO2: 100% (09 Jul 2018 13:38) (97% - 100%)            LABS:          PT/INR - ( 09 Jul 2018 06:55 )   PT: 12.7 sec;   INR: 1.16 ratio         PTT - ( 09 Jul 2018 06:55 )  PTT:27.7 sec    CAPILLARY BLOOD GLUCOSE

## 2018-07-09 NOTE — DISCHARGE NOTE ADULT - OTHER SIGNIFICANT FINDINGS
Patient has increase risk of DVT/PE and was on anticoagulations prior to surgery. Patient had a clearance by Dr. Villa hematology prior to surgery and recommended post-op starting on IV heparin and then transitioning to enoxaparin 100mg subQ for 5 days and taking warfarin. For management of coagulation please contact hematologist

## 2018-07-09 NOTE — DISCHARGE NOTE ADULT - CARE PROVIDER_API CALL
Britni Do), Orthopedics  [address] [place] [address]  Phone: [phone]  Fax: [phone] Britni Do), Orthopedics  [address] [address]  Phone: [phone]  Fax: [phone]    Albert Villa), Hematology; HospicePalliative Medicine; Internal Medicine; Medical Oncology  [address] [place] [address]  Phone: [phone]  Fax: [phone]

## 2018-07-09 NOTE — BRIEF OPERATIVE NOTE - PROCEDURE
<<-----Click on this checkbox to enter Procedure Right total knee arthroplasty  07/09/2018    Active  Chava Flores

## 2018-07-09 NOTE — PATIENT PROFILE ADULT. - PSH
[place] filter in place  2005  H/O eye surgery  left retinal tear, 2012  History of ankle fusion  right, 2017  History of appendectomy  2015  History of appendectomy    History of hernia surgery  2013  S/P hip replacement  bilateral, 2008, 2009  S/P hysterectomy  1999  Varicose vein of leg  cauterization left leg vein 1989

## 2018-07-09 NOTE — DISCHARGE NOTE ADULT - PATIENT PORTAL LINK FT
You can access the [facility] Patient Portal, offered by [facility], by registering with the following website: [email or web address]

## 2018-07-09 NOTE — DISCHARGE NOTE ADULT - CARE PROVIDERS DIRECT ADDRESSES
,[email or web address]

## 2018-07-09 NOTE — PHYSICAL THERAPY INITIAL EVALUATION ADULT - RANGE OF MOTION EXAMINATION, REHAB EVAL
bilateral upper extremity ROM was WFL (within functional limits)/deficits as listed below/right LE ~50 degrees flexion/Left LE ROM was WFL (within functional limits)

## 2018-07-09 NOTE — CONSULT NOTE ADULT - PROBLEM SELECTOR RECOMMENDATION 9
pain meds as per anesthesia.  PT/OT.  DVT prophylaxis.  DVT ppx: [ ]ASA81 [ ] RLZ000 [ x] Lovenox [x] Coumadin   [ ] Eliquis [  ] Heparin  Dispo:     Home [ ]     Acute Rehab [ ]     PARMINDER [ x]     TBD [ ]

## 2018-07-09 NOTE — DISCHARGE NOTE ADULT - MEDICATION SUMMARY - MEDICATIONS TO TAKE
I will START or STAY ON the medications listed below when I get home from the hospital:    acetaminophen 500 mg oral tablet  -- 2 tab(s) by mouth every 12 hours x 14 days  -- Indication: For Pain    celecoxib 200 mg oral capsule  -- 1 cap(s) by mouth 2 times a day x 19 days  -- Indication: For Pain    oxyCODONE 5 mg oral tablet  -- 1 tab(s) by mouth every 4 hours, As needed, Mild Pain (1 - 3)  -- Indication: For Pain    oxyCODONE 10 mg oral tablet  -- 1 tab(s) by mouth every 4 hours, As needed, Moderate Pain (4 - 6)  -- Indication: For Pain    warfarin 5 mg oral tablet  -- 1 tab(s) by mouth once a day. Continue to monitor INR until theraputic.   -- Indication: For Clot prevention    enoxaparin  -- 100 milligram(s) subcutaneous 2 times a day. Started on 7/11 and as per hematology should be continued for total of 5 days. On concurrent wafarin. Continue to monitor INR  -- Indication: For Clot prevention    Lyrica 150 mg oral capsule  -- 1 cap(s) by mouth 2 times a day  -- Indication: For Home med    Advair Diskus 100 mcg-50 mcg inhalation powder  -- 1 puff(s) inhaled 2 times a day  -- Indication: For Home med    Ventolin HFA 90 mcg/inh inhalation aerosol  -- 2 puff(s) inhaled 4 times a day, As Needed  -- Indication: For Home med    amLODIPine 5 mg oral tablet  -- 1 tab(s) by mouth once a day  -- Indication: For Hypertension    chlorthalidone 25 mg oral tablet  -- 1 tab(s) by mouth once a day  -- Indication: For Home med    ferrous sulfate 325 mg (65 mg elemental iron) oral tablet  -- 325 milligram(s) by mouth once a day  -- Indication: For Supplement    senna oral tablet  -- 2 tab(s) by mouth once a day (at bedtime)  -- Indication: For Constipation    docusate sodium 100 mg oral capsule  -- 1 cap(s) by mouth 3 times a day  -- Indication: For Constipation    polyethylene glycol 3350 oral powder for reconstitution  -- 17 gram(s) by mouth once a day, As needed, Constipation  -- Indication: For Constipation    pantoprazole 40 mg oral delayed release tablet  -- 1 tab(s) by mouth once a day  -- Indication: For Gastrointestinal protection I will START or STAY ON the medications listed below when I get home from the hospital:    acetaminophen 500 mg oral tablet  -- 2 tab(s) by mouth every 12 hours x 14 days  -- Indication: For Pain    celecoxib 200 mg oral capsule  -- 1 cap(s) by mouth 2 times a day x 19 days  -- Indication: For Pain    oxyCODONE 5 mg oral tablet  -- 1 tab(s) by mouth every 4 hours, As needed, Mild Pain (1 - 3)  -- Indication: For Pain    oxyCODONE 10 mg oral tablet  -- 1 tab(s) by mouth every 4 hours, As needed, Moderate Pain (4 - 6)  -- Indication: For Pain    enoxaparin  -- 100 milligram(s) subcutaneous 2 times a day. Started on 7/11 and as per hematology should be continued for total of 5 days. On concurrent wafarin. Continue to monitor INR  -- Indication: For Clot prevention    warfarin 7.5 mg oral tablet  -- 1 tab(s) by mouth once was given on 7/12/18. Patient takes 7.5mg of warfarin on Mon, Thur and 5mg on Sun,Tues, Wed, Fri, Sat pre-op. Adjust warfarin dose based on PT/INR  -- Indication: For Clot prevention    Lyrica 150 mg oral capsule  -- 1 cap(s) by mouth 2 times a day  -- Indication: For Home med    Advair Diskus 100 mcg-50 mcg inhalation powder  -- 1 puff(s) inhaled 2 times a day  -- Indication: For Home med    Ventolin HFA 90 mcg/inh inhalation aerosol  -- 2 puff(s) inhaled 4 times a day, As Needed  -- Indication: For Home med    amLODIPine 5 mg oral tablet  -- 1 tab(s) by mouth once a day  -- Indication: For Hypertension    chlorthalidone 25 mg oral tablet  -- 1 tab(s) by mouth once a day  -- Indication: For Home med    ferrous sulfate 325 mg (65 mg elemental iron) oral tablet  -- 325 milligram(s) by mouth once a day  -- Indication: For Supplement    senna oral tablet  -- 2 tab(s) by mouth once a day (at bedtime)  -- Indication: For Constipation    docusate sodium 100 mg oral capsule  -- 1 cap(s) by mouth 3 times a day  -- Indication: For Constipation    polyethylene glycol 3350 oral powder for reconstitution  -- 17 gram(s) by mouth once a day, As needed, Constipation  -- Indication: For Constipation    pantoprazole 40 mg oral delayed release tablet  -- 1 tab(s) by mouth once a day  -- Indication: For Gastrointestinal protection

## 2018-07-09 NOTE — DISCHARGE NOTE ADULT - CARE PLAN
Principal Discharge DX:	Aftercare following right knee joint replacement surgery  Goal:	Improve ambulation, ADLs and quality of life  Assessment and plan of treatment:	Physical Therapy/Occupational Therapy for ambulation, transfers, stairs, ADL's, Range of Motion Exercises, Isometrics.  Full weight bearing as tolerated with rolling walker  Range of Motion Goals: Flexion 120 degrees; Extension 0 degrees  Keep incision clean and dry.  Prineo dressing removal 14 days after surgery at rehab facility or Surgeon's office  May shower post-op day #5 if no drainage from incision  - Call your doctor if you experience:  • An increase in pain not controlled by pain medication or change in activity or position.  • Temperature greater than 101° F.  • Redness, increased swelling or foul smelling drainage from or around the incision.  • Numbness, tingling or a change in color or temperature of the operative leg.  • Call your doctor immediately if you experience chest pain, shortness of breath or calf pain.

## 2018-07-09 NOTE — DISCHARGE NOTE ADULT - ADDITIONAL INSTRUCTIONS
follow up with Dr. Do on 7/23/18 @ 9:20 am follow up with Dr. Do on 7/23/18 @ 9:20 am  Follow up with Dr. Albert Villa after discharge

## 2018-07-09 NOTE — OCCUPATIONAL THERAPY INITIAL EVALUATION ADULT - ADDITIONAL COMMENTS
Pt l Pt lives in an apt with 1 step + ramp to enter apt bldg. +elevator inside bldg to access apt. No steps inside apt. Pt has a bathtub with glass doors. Pt reports that she owns a wheeled walker/rollator that she has been using to ambulate outdoors and for community mobility for over a year. Pt also owns a cane that she ambulated with indoors. Pt lives in an apt with 1 step + ramp to enter apt bldg. +elevator inside bldg to access apt. No steps inside apt. Pt has a bathtub with glass doors + shower chair and commode . Pt reports that she owns a wheeled walker/rollator /SAC

## 2018-07-09 NOTE — DISCHARGE NOTE ADULT - HOSPITAL COURSE
This patient was admitted to [facility] with a history of severe degenerative joint disease of the Right knee.  Patient went to Pre-Surgical Testing at [facility] and was medically cleared to undergo elective procedure. Patient underwent Right TKR by Dr. Britni Do on 7/9/18. Procedure was well tolerated.  No operative or johnny-operative complications arose during patients hospital course.  Patient received antibiotic according to SCIP guidelines for infection prevention. Heparin then Lovenox were given while patient was transitioned to Coumadin for DVT prophylaxis.  Anesthesia, Medical Hospitalist, Physical Therapy and Occupational Therapy were consulted. Patient is stable for discharge with a good prognosis.  Appropriate discharge instructions and medications are provided in this document. This patient was admitted to [facility] with a history of severe degenerative joint disease of the Right knee.  Patient went to Pre-Surgical Testing at [facility] and was medically cleared to undergo elective procedure. Patient underwent Right TKR by Dr. Britni Do on 7/9/18. Procedure was well tolerated.  No operative or johnny-operative complications arose during patients hospital course.  Patient received antibiotic according to SCIP guidelines for infection prevention. Heparin then Lovenox were given while patient was transitioned to Coumadin for DVT prophylaxis.  Anesthesia, Medical Hospitalist, Physical Therapy and Occupational Therapy were consulted. Patient is stable for discharge with a good prognosis.  Appropriate discharge instructions and medications are provided in this document.

## 2018-07-09 NOTE — PHYSICAL THERAPY INITIAL EVALUATION ADULT - GAIT TRAINING, PT EVAL
in 3-5 sessions, pt. will ambulate 150ft with a rolling walker independently in 3-5 sessions, pt. will ambulate 75ft with a rolling walker independently

## 2018-07-09 NOTE — PROGRESS NOTE ADULT - SUBJECTIVE AND OBJECTIVE BOX
Ortho PA Post Op Check    Procedure: Right TKR  Surgeon: MD Ernestina    Pt comfortable without complaints, pain controlled- 2/10 now; Denies CP, SOB, N/V, numbness/tingling; tolerated PO fluids well.   stood and stepped with PT earlier.  Pain Rx:  acetaminophen  IVPB. 1000 milliGRAM(s) IV Intermittent every 6 hours  HYDROmorphone  Injectable 0.5 milliGRAM(s) IV Push every 3 hours PRN  oxyCODONE    IR 5 milliGRAM(s) Oral every 3 hours PRN  oxyCODONE    IR 10 milliGRAM(s) Oral every 3 hours PRN  pregabalin 150 milliGRAM(s) Oral two times a day    General Exam:  Vital Signs Last 24 Hrs  T(C): 36.4 (07-09-18 @ 11:00), Max: 36.4 (07-09-18 @ 11:00)  T(F): 97.6 (07-09-18 @ 11:00), Max: 97.6 (07-09-18 @ 11:00)  HR: 60 (07-09-18 @ 13:38) (60 - 66)  BP: 124/77 (07-09-18 @ 13:38) (114/61 - 134/70)  RR: 16 (07-09-18 @ 13:38) (11 - 18)  SpO2: 100% (07-09-18 @ 13:38) (97% - 100%)    General: Pt Alert and oriented, NAD, controlled pain.  Ext(Knee): Right Knee Dunham Dressing clean, dry, & intact, Dunham dressing not constricting at proximal and distal ends  Neuro/Vasc: Feet toes warm, pink. DP = 2+. No calf tenderness bilat.. Has sensation over feet & toes bilat. Full AROM bilat feet & toes. EHL = 5/5 bilat. (Right ankle limited ROM post triple arthrodesis)  Urine Out [11P-7A] = 250 ml; HVAC = 50 ml so far  VTEP: On Venodynes Bilat + Lovenox to start in AM  warfarin 2.5 milliGRAM(s) Oral at bedtime    Hospitalist input noted.       A/P: 72yFemale POD#0 s/p right TKR  - Stable from orthopedic standpoint  - Pain Control- stable  - Post op abx: ordered  - Post op labs pending  - Weight bearing status: full  - DVT ppx: IV heparin infusion advised in pre-op hematology consult to start POD1 and transition to Lovenox 100mg BID on POD3. Warfarin advised to start POD2.   reviewed in detail with Orthopedic Pharmacist KATHIE Palafox. Discussed anticoag plan with Surgeon who agreed with IV Heparin infusion in AM.   Discussed risks and benefits of therapeutic anticoagulation post TKR with patient and daughter and high risk of post-op knee swelling, stiffness, and long standing hemarthrosis with sequelae.   Discussed anticoag plan with Hospitalist Dr. HAO Cabrera who agreed.

## 2018-07-09 NOTE — DISCHARGE NOTE ADULT - MEDICATION SUMMARY - MEDICATIONS TO CHANGE
I will SWITCH the dose or number of times a day I take the medications listed below when I get home from the hospital:    Coumadin 2.5 mg oral tablet  -- Monday, Thrusday: 3 tablets (7.5mg)  Tues,Wed,Fri,Sat,Sun: 2 tablets (5mg)

## 2018-07-10 LAB
ANION GAP SERPL CALC-SCNC: 8 MMOL/L — SIGNIFICANT CHANGE UP (ref 5–17)
APTT BLD: 31.6 SEC — SIGNIFICANT CHANGE UP (ref 27.5–37.4)
APTT BLD: 37.5 SEC — HIGH (ref 27.5–37.4)
APTT BLD: 77.2 SEC — HIGH (ref 27.5–37.4)
BUN SERPL-MCNC: 10 MG/DL — SIGNIFICANT CHANGE UP (ref 7–23)
CALCIUM SERPL-MCNC: 9.3 MG/DL — SIGNIFICANT CHANGE UP (ref 8.4–10.5)
CHLORIDE SERPL-SCNC: 99 MMOL/L — SIGNIFICANT CHANGE UP (ref 96–108)
CO2 SERPL-SCNC: 29 MMOL/L — SIGNIFICANT CHANGE UP (ref 22–31)
CREAT SERPL-MCNC: 0.8 MG/DL — SIGNIFICANT CHANGE UP (ref 0.5–1.3)
GLUCOSE SERPL-MCNC: 125 MG/DL — HIGH (ref 70–99)
HCT VFR BLD CALC: 28.1 % — LOW (ref 34.5–45)
HCT VFR BLD CALC: 30.2 % — LOW (ref 34.5–45)
HGB BLD-MCNC: 9.2 G/DL — LOW (ref 11.5–15.5)
HGB BLD-MCNC: 9.8 G/DL — LOW (ref 11.5–15.5)
INR BLD: 1.15 RATIO — SIGNIFICANT CHANGE UP (ref 0.88–1.16)
MCHC RBC-ENTMCNC: 21.5 PG — LOW (ref 27–34)
MCHC RBC-ENTMCNC: 21.6 PG — LOW (ref 27–34)
MCHC RBC-ENTMCNC: 32.5 GM/DL — SIGNIFICANT CHANGE UP (ref 32–36)
MCHC RBC-ENTMCNC: 32.7 GM/DL — SIGNIFICANT CHANGE UP (ref 32–36)
MCV RBC AUTO: 66.1 FL — LOW (ref 80–100)
MCV RBC AUTO: 66.2 FL — LOW (ref 80–100)
NRBC # BLD: 0 /100 WBCS — SIGNIFICANT CHANGE UP (ref 0–0)
NRBC # BLD: 0 /100 WBCS — SIGNIFICANT CHANGE UP (ref 0–0)
PLATELET # BLD AUTO: 258 K/UL — SIGNIFICANT CHANGE UP (ref 150–400)
PLATELET # BLD AUTO: 300 K/UL — SIGNIFICANT CHANGE UP (ref 150–400)
POTASSIUM SERPL-MCNC: 3.7 MMOL/L — SIGNIFICANT CHANGE UP (ref 3.5–5.3)
POTASSIUM SERPL-SCNC: 3.7 MMOL/L — SIGNIFICANT CHANGE UP (ref 3.5–5.3)
PROTHROM AB SERPL-ACNC: 12.6 SEC — SIGNIFICANT CHANGE UP (ref 9.8–12.7)
RBC # BLD: 4.25 M/UL — SIGNIFICANT CHANGE UP (ref 3.8–5.2)
RBC # BLD: 4.56 M/UL — SIGNIFICANT CHANGE UP (ref 3.8–5.2)
RBC # FLD: 16.8 % — HIGH (ref 10.3–14.5)
RBC # FLD: 17.1 % — HIGH (ref 10.3–14.5)
SODIUM SERPL-SCNC: 136 MMOL/L — SIGNIFICANT CHANGE UP (ref 135–145)
WBC # BLD: 9.71 K/UL — SIGNIFICANT CHANGE UP (ref 3.8–10.5)
WBC # BLD: 9.87 K/UL — SIGNIFICANT CHANGE UP (ref 3.8–10.5)
WBC # FLD AUTO: 9.71 K/UL — SIGNIFICANT CHANGE UP (ref 3.8–10.5)
WBC # FLD AUTO: 9.87 K/UL — SIGNIFICANT CHANGE UP (ref 3.8–10.5)

## 2018-07-10 PROCEDURE — 93971 EXTREMITY STUDY: CPT | Mod: 26,RT

## 2018-07-10 PROCEDURE — 99233 SBSQ HOSP IP/OBS HIGH 50: CPT

## 2018-07-10 RX ORDER — BENZOCAINE AND MENTHOL 5; 1 G/100ML; G/100ML
1 LIQUID ORAL EVERY 6 HOURS
Qty: 0 | Refills: 0 | Status: DISCONTINUED | OUTPATIENT
Start: 2018-07-10 | End: 2018-07-12

## 2018-07-10 RX ADMIN — HEPARIN SODIUM 1200 UNIT(S)/HR: 5000 INJECTION INTRAVENOUS; SUBCUTANEOUS at 22:48

## 2018-07-10 RX ADMIN — HEPARIN SODIUM 1000 UNIT(S)/HR: 5000 INJECTION INTRAVENOUS; SUBCUTANEOUS at 09:29

## 2018-07-10 RX ADMIN — OXYCODONE HYDROCHLORIDE 10 MILLIGRAM(S): 5 TABLET ORAL at 09:27

## 2018-07-10 RX ADMIN — Medication 150 MILLIGRAM(S): at 05:43

## 2018-07-10 RX ADMIN — Medication 100 MILLIGRAM(S): at 21:29

## 2018-07-10 RX ADMIN — CELECOXIB 200 MILLIGRAM(S): 200 CAPSULE ORAL at 05:43

## 2018-07-10 RX ADMIN — BUDESONIDE AND FORMOTEROL FUMARATE DIHYDRATE 2 PUFF(S): 160; 4.5 AEROSOL RESPIRATORY (INHALATION) at 05:48

## 2018-07-10 RX ADMIN — SENNA PLUS 2 TABLET(S): 8.6 TABLET ORAL at 21:29

## 2018-07-10 RX ADMIN — OXYCODONE HYDROCHLORIDE 10 MILLIGRAM(S): 5 TABLET ORAL at 08:57

## 2018-07-10 RX ADMIN — OXYCODONE HYDROCHLORIDE 10 MILLIGRAM(S): 5 TABLET ORAL at 13:49

## 2018-07-10 RX ADMIN — OXYCODONE HYDROCHLORIDE 10 MILLIGRAM(S): 5 TABLET ORAL at 13:19

## 2018-07-10 RX ADMIN — Medication 100 MILLIGRAM(S): at 01:05

## 2018-07-10 RX ADMIN — Medication 1000 MILLIGRAM(S): at 11:27

## 2018-07-10 RX ADMIN — CELECOXIB 200 MILLIGRAM(S): 200 CAPSULE ORAL at 18:16

## 2018-07-10 RX ADMIN — Medication 150 MILLIGRAM(S): at 18:16

## 2018-07-10 RX ADMIN — Medication 100 MILLIGRAM(S): at 05:43

## 2018-07-10 RX ADMIN — BUDESONIDE AND FORMOTEROL FUMARATE DIHYDRATE 2 PUFF(S): 160; 4.5 AEROSOL RESPIRATORY (INHALATION) at 21:29

## 2018-07-10 RX ADMIN — CELECOXIB 200 MILLIGRAM(S): 200 CAPSULE ORAL at 18:46

## 2018-07-10 RX ADMIN — HEPARIN SODIUM 1300 UNIT(S)/HR: 5000 INJECTION INTRAVENOUS; SUBCUTANEOUS at 16:54

## 2018-07-10 RX ADMIN — Medication 1000 MILLIGRAM(S): at 10:57

## 2018-07-10 RX ADMIN — CELECOXIB 200 MILLIGRAM(S): 200 CAPSULE ORAL at 05:44

## 2018-07-10 RX ADMIN — AMLODIPINE BESYLATE 5 MILLIGRAM(S): 2.5 TABLET ORAL at 18:16

## 2018-07-10 RX ADMIN — Medication 1000 MILLIGRAM(S): at 03:30

## 2018-07-10 RX ADMIN — Medication 1000 MILLIGRAM(S): at 18:46

## 2018-07-10 RX ADMIN — Medication 1000 MILLIGRAM(S): at 18:16

## 2018-07-10 RX ADMIN — Medication 100 MILLIGRAM(S): at 13:18

## 2018-07-10 RX ADMIN — PANTOPRAZOLE SODIUM 40 MILLIGRAM(S): 20 TABLET, DELAYED RELEASE ORAL at 13:20

## 2018-07-10 RX ADMIN — AMLODIPINE BESYLATE 5 MILLIGRAM(S): 2.5 TABLET ORAL at 05:43

## 2018-07-10 RX ADMIN — Medication 400 MILLIGRAM(S): at 03:30

## 2018-07-10 NOTE — DIETITIAN INITIAL EVALUATION ADULT. - OTHER INFO
73 yo female seen per policy for coumadin education; pt has been on coumadin x > 10yrs and aware of food/drug interactions; however, pt has been requesting cranberry juice, which is not the best option for patients on coumadin; reviewed food and drug interactions with patient verbally; also, gave pt written education regarding the same; verbal understanding provided; pt with planned wt loss PTA of ~30# x 4 months; however, pt is slowing gaining that wt back; goal wt = 150#, which appears to be an unrealistic expectation due to pt's lack of mobility and age; discussed pt's options and agreed that more realistic goal of < 200# would benefit pt; resolving +3 edema of Rt and Lt ankles; pt has good PO intake of Regular diet and able to feed self without difficulty; witnessed pt consume an orange during interview without issue; preferences reviewed; also, wt loss strategies discussed with patient who verbalized understanding of the same; PMH to follow;

## 2018-07-10 NOTE — DIETITIAN INITIAL EVALUATION ADULT. - SIGNS/SYMPTOMS
as evidenced by varied lab values (INR values = 1.15, 1.16, 3.22) as evidenced by BMI = 42.1, abnormal labs

## 2018-07-10 NOTE — DIETITIAN INITIAL EVALUATION ADULT. - NS AS NUTRI INTERV ED CONTENT3
Survival information/Nutrition relationship to health/disease/Recommended modifications/Purpose of the nutrition education/Priority modifications

## 2018-07-10 NOTE — PROGRESS NOTE ADULT - SUBJECTIVE AND OBJECTIVE BOX
Patient is a 72y old  Female who presents with a chief complaint of pain right knee (09 Jul 2018 18:33)      INTERVAL HPI/OVERNIGHT EVENTS:  Pt is seen and examined.  sitting on a chair. pain is controlled. c/o right calf tightness.  Pain Location & Control:     MEDICATIONS  (STANDING):  acetaminophen   Tablet. 1000 milliGRAM(s) Oral every 8 hours  amLODIPine   Tablet 5 milliGRAM(s) Oral <User Schedule>  buDESOnide  80 MICROgram(s)/formoterol 4.5 MICROgram(s) Inhaler 2 Puff(s) Inhalation two times a day  celecoxib 200 milliGRAM(s) Oral two times a day  docusate sodium 100 milliGRAM(s) Oral three times a day  heparin  Infusion.  Unit(s)/Hr (10 mL/Hr) IV Continuous <Continuous>  lactated ringers. 1000 milliLiter(s) (75 mL/Hr) IV Continuous <Continuous>  lactated ringers. 1000 milliLiter(s) (100 mL/Hr) IV Continuous <Continuous>  pantoprazole    Tablet 40 milliGRAM(s) Oral daily  pregabalin 150 milliGRAM(s) Oral two times a day  senna 2 Tablet(s) Oral at bedtime    MEDICATIONS  (PRN):  ALBUTerol    90 MICROgram(s) HFA Inhaler 2 Puff(s) Inhalation every 6 hours PRN Shortness of Breath and/or Wheezing  aluminum hydroxide/magnesium hydroxide/simethicone Suspension 30 milliLiter(s) Oral four times a day PRN Indigestion  HYDROmorphone  Injectable 0.5 milliGRAM(s) IV Push every 3 hours PRN Severe Pain (7 - 10)  magnesium hydroxide Suspension 30 milliLiter(s) Oral daily PRN Constipation  ondansetron Injectable 4 milliGRAM(s) IV Push every 6 hours PRN Nausea and/or Vomiting  oxyCODONE    IR 5 milliGRAM(s) Oral every 3 hours PRN Mild Pain (1 - 3)  oxyCODONE    IR 10 milliGRAM(s) Oral every 3 hours PRN Moderate Pain (4 - 6)  polyethylene glycol 3350 17 Gram(s) Oral daily PRN Constipation      Allergies    Iodides (Short breath)    Intolerances            Vital Signs Last 24 Hrs  T(C): 36.4 (10 Jul 2018 07:05), Max: 36.8 (09 Jul 2018 19:12)  T(F): 97.5 (10 Jul 2018 07:05), Max: 98.2 (09 Jul 2018 19:12)  HR: 77 (10 Jul 2018 07:05) (60 - 77)  BP: 115/70 (10 Jul 2018 07:05) (114/61 - 143/83)  BP(mean): --  RR: 18 (10 Jul 2018 07:05) (11 - 18)  SpO2: 98% (10 Jul 2018 07:05) (94% - 100%)        LABS:                        9.8    9.87  )-----------( 300      ( 10 Jul 2018 07:09 )             30.2     10 Jul 2018 07:09    136    |  99     |  10     ----------------------------<  125    3.7     |  29     |  0.80     Ca    9.3        10 Jul 2018 07:09      PT/INR - ( 10 Jul 2018 07:09 )   PT: 12.6 sec;   INR: 1.15 ratio         PTT - ( 10 Jul 2018 07:09 )  PTT:31.6 sec

## 2018-07-10 NOTE — PROGRESS NOTE ADULT - ATTENDING COMMENTS
Plan of care was discuss with patient, all questions were answered, seems understand and in agreement. Plan of care was discuss with patient, all questions were answered, seems understand and in agreement.  f/u venous doppler study of LE.

## 2018-07-10 NOTE — PROGRESS NOTE ADULT - SUBJECTIVE AND OBJECTIVE BOX
Post Op Day # 1    SUBJECTIVE    73yo Female status post right TKR .   Patient is alert and comfortable.    Pain is controlled with current pain regimen.  Denies nausea, vomiting, chest pain, shortness of breath, abdominal pain or fever.   Patient c/o right calf pain while OOB this am.    OBJECTIVE    Vital Signs Last 24 Hrs  T(C): 36.4 (10 Jul 2018 07:05), Max: 36.8 (09 Jul 2018 19:12)  T(F): 97.5 (10 Jul 2018 07:05), Max: 98.2 (09 Jul 2018 19:12)  HR: 77 (10 Jul 2018 07:05) (60 - 77)  BP: 115/70 (10 Jul 2018 07:05) (114/61 - 143/83)  BP(mean): --  RR: 18 (10 Jul 2018 07:05) (11 - 18)  SpO2: 98% (10 Jul 2018 07:05) (94% - 100%)  I&O's Summary    09 Jul 2018 07:01  -  10 Jul 2018 07:00  --------------------------------------------------------  IN: 2175 mL / OUT: 1630 mL / NET: 545 mL        PHYSICAL EXAM    Right knee dressing is clean, dry and intact.   The calf is soft and tender to palpation. Negative Khurram's sign.  Passive range of motion is acceptable to due postoperative pain.   Sensation to light touch is grossly intact distally.   The lateral cutaneous nerve is intact.   Motor function distally is intact. Right ankle ROM is decreased due to recent triple arthrodesis.  No foot drop.   (2+) dorsalis pedis pulse. Capillary refill is less than 2 seconds. No cyanosis.                          9.8<L>  9.87  )-----------( 300      ( 10 Jul 2018 07:09 )             30.2<L>  10 Jul 2018 07:09                        11.0<L>  x     )-----------( x        ( 09 Jul 2018 17:19 )             35.3   09 Jul 2018 17:19    10 Jul 2018 07:09    136    |  99     |  10     ----------------------------<  125<H>  3.7     |  29     |  0.80   09 Jul 2018 17:19    136    |  99     |  11     ----------------------------<  226<H>  3.4<L>   |  27     |  0.92     Ca    9.3        10 Jul 2018 07:09  Ca    9.0        09 Jul 2018 17:19        ASSESSMENT AND PLAN  - Orthopedically stable  - Calf tenderness: will order right LE doppler  - DVT prophylaxis: PAS + IV Heparin [id number] , followed by Lovenox 100mg [id number]. Will start Coumadin on POD#2  - Continue physical therapy and occupational therapy   - Weight bearing as tolerated of the right lower extremity with assistance of a walker   - Incentive spirometry encouraged  - Pain control as clinically indicated  - Disposition:  subacute rehabilitation

## 2018-07-11 DIAGNOSIS — D50.0 IRON DEFICIENCY ANEMIA SECONDARY TO BLOOD LOSS (CHRONIC): ICD-10-CM

## 2018-07-11 DIAGNOSIS — E87.6 HYPOKALEMIA: ICD-10-CM

## 2018-07-11 DIAGNOSIS — E87.1 HYPO-OSMOLALITY AND HYPONATREMIA: ICD-10-CM

## 2018-07-11 DIAGNOSIS — I95.89 OTHER HYPOTENSION: ICD-10-CM

## 2018-07-11 LAB
ANION GAP SERPL CALC-SCNC: 7 MMOL/L — SIGNIFICANT CHANGE UP (ref 5–17)
APTT BLD: 84.4 SEC — HIGH (ref 27.5–37.4)
BUN SERPL-MCNC: 18 MG/DL — SIGNIFICANT CHANGE UP (ref 7–23)
CALCIUM SERPL-MCNC: 7.9 MG/DL — LOW (ref 8.4–10.5)
CHLORIDE SERPL-SCNC: 97 MMOL/L — SIGNIFICANT CHANGE UP (ref 96–108)
CO2 SERPL-SCNC: 28 MMOL/L — SIGNIFICANT CHANGE UP (ref 22–31)
CREAT SERPL-MCNC: 0.78 MG/DL — SIGNIFICANT CHANGE UP (ref 0.5–1.3)
GLUCOSE SERPL-MCNC: 97 MG/DL — SIGNIFICANT CHANGE UP (ref 70–99)
HCT VFR BLD CALC: 25.6 % — LOW (ref 34.5–45)
HGB BLD-MCNC: 8.4 G/DL — LOW (ref 11.5–15.5)
MCHC RBC-ENTMCNC: 21.4 PG — LOW (ref 27–34)
MCHC RBC-ENTMCNC: 32.8 GM/DL — SIGNIFICANT CHANGE UP (ref 32–36)
MCV RBC AUTO: 65.3 FL — LOW (ref 80–100)
NRBC # BLD: 0 /100 WBCS — SIGNIFICANT CHANGE UP (ref 0–0)
PLATELET # BLD AUTO: 244 K/UL — SIGNIFICANT CHANGE UP (ref 150–400)
POTASSIUM SERPL-MCNC: 3.3 MMOL/L — LOW (ref 3.5–5.3)
POTASSIUM SERPL-SCNC: 3.3 MMOL/L — LOW (ref 3.5–5.3)
RBC # BLD: 3.92 M/UL — SIGNIFICANT CHANGE UP (ref 3.8–5.2)
RBC # FLD: 16.6 % — HIGH (ref 10.3–14.5)
SODIUM SERPL-SCNC: 132 MMOL/L — LOW (ref 135–145)
WBC # BLD: 8.55 K/UL — SIGNIFICANT CHANGE UP (ref 3.8–10.5)
WBC # FLD AUTO: 8.55 K/UL — SIGNIFICANT CHANGE UP (ref 3.8–10.5)

## 2018-07-11 PROCEDURE — 99233 SBSQ HOSP IP/OBS HIGH 50: CPT

## 2018-07-11 RX ORDER — SODIUM CHLORIDE 9 MG/ML
1000 INJECTION INTRAMUSCULAR; INTRAVENOUS; SUBCUTANEOUS
Qty: 0 | Refills: 0 | Status: DISCONTINUED | OUTPATIENT
Start: 2018-07-11 | End: 2018-07-12

## 2018-07-11 RX ORDER — POTASSIUM CHLORIDE 20 MEQ
40 PACKET (EA) ORAL ONCE
Qty: 0 | Refills: 0 | Status: COMPLETED | OUTPATIENT
Start: 2018-07-11 | End: 2018-07-11

## 2018-07-11 RX ORDER — ACETAMINOPHEN 500 MG
1000 TABLET ORAL EVERY 12 HOURS
Qty: 0 | Refills: 0 | Status: DISCONTINUED | OUTPATIENT
Start: 2018-07-11 | End: 2018-07-12

## 2018-07-11 RX ORDER — FERROUS SULFATE 325(65) MG
325 TABLET ORAL DAILY
Qty: 0 | Refills: 0 | Status: DISCONTINUED | OUTPATIENT
Start: 2018-07-11 | End: 2018-07-12

## 2018-07-11 RX ADMIN — Medication 100 MILLIGRAM(S): at 13:31

## 2018-07-11 RX ADMIN — HEPARIN SODIUM 0 UNIT(S)/HR: 5000 INJECTION INTRAVENOUS; SUBCUTANEOUS at 05:21

## 2018-07-11 RX ADMIN — Medication 100 MILLIGRAM(S): at 21:04

## 2018-07-11 RX ADMIN — ENOXAPARIN SODIUM 100 MILLIGRAM(S): 100 INJECTION SUBCUTANEOUS at 10:28

## 2018-07-11 RX ADMIN — HEPARIN SODIUM 1000 UNIT(S)/HR: 5000 INJECTION INTRAVENOUS; SUBCUTANEOUS at 06:02

## 2018-07-11 RX ADMIN — Medication 1000 MILLIGRAM(S): at 05:53

## 2018-07-11 RX ADMIN — ALBUTEROL 2 PUFF(S): 90 AEROSOL, METERED ORAL at 06:27

## 2018-07-11 RX ADMIN — OXYCODONE HYDROCHLORIDE 10 MILLIGRAM(S): 5 TABLET ORAL at 09:08

## 2018-07-11 RX ADMIN — Medication 150 MILLIGRAM(S): at 05:30

## 2018-07-11 RX ADMIN — Medication 1000 MILLIGRAM(S): at 05:30

## 2018-07-11 RX ADMIN — OXYCODONE HYDROCHLORIDE 10 MILLIGRAM(S): 5 TABLET ORAL at 14:02

## 2018-07-11 RX ADMIN — AMLODIPINE BESYLATE 5 MILLIGRAM(S): 2.5 TABLET ORAL at 17:39

## 2018-07-11 RX ADMIN — OXYCODONE HYDROCHLORIDE 10 MILLIGRAM(S): 5 TABLET ORAL at 08:38

## 2018-07-11 RX ADMIN — AMLODIPINE BESYLATE 5 MILLIGRAM(S): 2.5 TABLET ORAL at 05:30

## 2018-07-11 RX ADMIN — BUDESONIDE AND FORMOTEROL FUMARATE DIHYDRATE 2 PUFF(S): 160; 4.5 AEROSOL RESPIRATORY (INHALATION) at 19:00

## 2018-07-11 RX ADMIN — SENNA PLUS 2 TABLET(S): 8.6 TABLET ORAL at 21:05

## 2018-07-11 RX ADMIN — Medication 1000 MILLIGRAM(S): at 17:39

## 2018-07-11 RX ADMIN — WARFARIN SODIUM 5 MILLIGRAM(S): 2.5 TABLET ORAL at 21:04

## 2018-07-11 RX ADMIN — PANTOPRAZOLE SODIUM 40 MILLIGRAM(S): 20 TABLET, DELAYED RELEASE ORAL at 13:31

## 2018-07-11 RX ADMIN — Medication 150 MILLIGRAM(S): at 17:43

## 2018-07-11 RX ADMIN — OXYCODONE HYDROCHLORIDE 10 MILLIGRAM(S): 5 TABLET ORAL at 04:34

## 2018-07-11 RX ADMIN — Medication 100 MILLIGRAM(S): at 05:30

## 2018-07-11 RX ADMIN — OXYCODONE HYDROCHLORIDE 10 MILLIGRAM(S): 5 TABLET ORAL at 13:32

## 2018-07-11 RX ADMIN — Medication 325 MILLIGRAM(S): at 13:32

## 2018-07-11 RX ADMIN — OXYCODONE HYDROCHLORIDE 10 MILLIGRAM(S): 5 TABLET ORAL at 05:15

## 2018-07-11 RX ADMIN — CELECOXIB 200 MILLIGRAM(S): 200 CAPSULE ORAL at 05:30

## 2018-07-11 RX ADMIN — CELECOXIB 200 MILLIGRAM(S): 200 CAPSULE ORAL at 05:54

## 2018-07-11 RX ADMIN — SODIUM CHLORIDE 75 MILLILITER(S): 9 INJECTION INTRAMUSCULAR; INTRAVENOUS; SUBCUTANEOUS at 10:30

## 2018-07-11 RX ADMIN — Medication 40 MILLIEQUIVALENT(S): at 08:34

## 2018-07-11 RX ADMIN — ENOXAPARIN SODIUM 100 MILLIGRAM(S): 100 INJECTION SUBCUTANEOUS at 21:05

## 2018-07-11 NOTE — PROGRESS NOTE ADULT - SUBJECTIVE AND OBJECTIVE BOX
POD  #:  2  S/P  Right TKR                       SUBJECTIVE: Patient seen and examined, sitting up in chair.  Pain is well controlled.   Reported Pain Score = 4    OBJECTIVE:     Vital Signs Last 24 Hrs  T(C): 36.4 (11 Jul 2018 07:55), Max: 36.8 (10 Jul 2018 11:32)  T(F): 97.5 (11 Jul 2018 07:55), Max: 98.3 (10 Jul 2018 11:32)  HR: 63 (11 Jul 2018 07:55) (60 - 77)  BP: 92/62 (11 Jul 2018 07:55) (92/62 - 135/84)  RR: 18 (11 Jul 2018 07:55) (16 - 18)  SpO2: 94% (11 Jul 2018 07:55) (94% - 99%)    Right Knee:          Dressing removed: incision clean/dry/intact, prineo tape in place. Hemovac drain in place = 10cc overnight  Bilateral LEs:         Sensation:  intact to light touch          Motor exam:  5/5 dorsiflexion/plantarflexion/EHL          2+ DP pulses          calf supple, NT    LABS:                        8.4    8.55  )-----------( 244      ( 11 Jul 2018 04:37 )             25.6     07-11    132<L>  |  97  |  18  ----------------------------<  97  3.3<L>   |  28  |  0.78    Ca    7.9<L>      11 Jul 2018 04:37      PT/INR - ( 10 Jul 2018 07:09 )   PT: 12.6 sec;   INR: 1.15 ratio         PTT - ( 11 Jul 2018 04:53 )  PTT:84.4 sec      MEDICATIONS:  Anticoagulation:  enoxaparin Injectable 100 milliGRAM(s) SubCutaneous every 12 hours  heparin  Infusion.  Unit(s)/Hr IV Continuous <Continuous>  warfarin 5 milliGRAM(s) Oral once      Pain medications:   acetaminophen   Tablet. 1000 milliGRAM(s) Oral every 12 hours  HYDROmorphone  Injectable 0.5 milliGRAM(s) IV Push every 3 hours PRN  ondansetron Injectable 4 milliGRAM(s) IV Push every 6 hours PRN  oxyCODONE    IR 5 milliGRAM(s) Oral every 3 hours PRN  oxyCODONE    IR 10 milliGRAM(s) Oral every 3 hours PRN  pregabalin 150 milliGRAM(s) Oral two times a day        A/P : Patient stable  s/p Right TKR POD # 2  -    dressing removed. Hemovac d/c'd. Dressing placed over drain site  -    Pain control  -    mild hyponatremia & hypokalemia.  Potassium supplement ordered by Dr. Cabrera.  -    DVT ppx: changing to Lovenox & Coumadin today  -    Weight bearing status: WBAT   -    Physical Therapy  -    Occupational Therapy  -    Discharge plan: rehab tomorrow

## 2018-07-11 NOTE — PROGRESS NOTE ADULT - ATTENDING COMMENTS
Plan of care was discuss with patient, all questions were answered, seems understand and in agreement.

## 2018-07-11 NOTE — PROGRESS NOTE ADULT - SUBJECTIVE AND OBJECTIVE BOX
Patient is a 72y old  Female who presents with a chief complaint of pain right knee (09 Jul 2018 18:33)      INTERVAL HPI/OVERNIGHT EVENTS:  Pt is seen and examined. pain is controlled.    Pain Location & Control:     MEDICATIONS  (STANDING):  acetaminophen   Tablet. 1000 milliGRAM(s) Oral every 12 hours  amLODIPine   Tablet 5 milliGRAM(s) Oral <User Schedule>  buDESOnide  80 MICROgram(s)/formoterol 4.5 MICROgram(s) Inhaler 2 Puff(s) Inhalation two times a day  docusate sodium 100 milliGRAM(s) Oral three times a day  enoxaparin Injectable 100 milliGRAM(s) SubCutaneous every 12 hours  heparin  Infusion.  Unit(s)/Hr (10 mL/Hr) IV Continuous <Continuous>  lactated ringers. 1000 milliLiter(s) (75 mL/Hr) IV Continuous <Continuous>  lactated ringers. 1000 milliLiter(s) (100 mL/Hr) IV Continuous <Continuous>  pantoprazole    Tablet 40 milliGRAM(s) Oral daily  pregabalin 150 milliGRAM(s) Oral two times a day  senna 2 Tablet(s) Oral at bedtime  warfarin 5 milliGRAM(s) Oral once    MEDICATIONS  (PRN):  ALBUTerol    90 MICROgram(s) HFA Inhaler 2 Puff(s) Inhalation every 6 hours PRN Shortness of Breath and/or Wheezing  aluminum hydroxide/magnesium hydroxide/simethicone Suspension 30 milliLiter(s) Oral four times a day PRN Indigestion  benzocaine 15 mG/menthol 3.6 mG Lozenge 1 Lozenge Oral every 6 hours PRN Sore Throat  bisacodyl Suppository 10 milliGRAM(s) Rectal daily PRN If no bowel movement by postoperative day #2  HYDROmorphone  Injectable 0.5 milliGRAM(s) IV Push every 3 hours PRN Severe Pain (7 - 10)  magnesium hydroxide Suspension 30 milliLiter(s) Oral daily PRN Constipation  ondansetron Injectable 4 milliGRAM(s) IV Push every 6 hours PRN Nausea and/or Vomiting  oxyCODONE    IR 5 milliGRAM(s) Oral every 3 hours PRN Mild Pain (1 - 3)  oxyCODONE    IR 10 milliGRAM(s) Oral every 3 hours PRN Moderate Pain (4 - 6)  polyethylene glycol 3350 17 Gram(s) Oral daily PRN Constipation      Allergies    Iodides (Short breath)    Intolerances            Vital Signs Last 24 Hrs  T(C): 36.4 (11 Jul 2018 07:55), Max: 36.8 (10 Jul 2018 11:32)  T(F): 97.5 (11 Jul 2018 07:55), Max: 98.3 (10 Jul 2018 11:32)  HR: 63 (11 Jul 2018 07:55) (60 - 77)  BP: 92/62 (11 Jul 2018 07:55) (92/62 - 135/84)  BP(mean): --  RR: 18 (11 Jul 2018 07:55) (16 - 18)  SpO2: 94% (11 Jul 2018 07:55) (94% - 99%)        LABS:                        8.4    8.55  )-----------( 244      ( 11 Jul 2018 04:37 )             25.6     11 Jul 2018 04:37    132    |  97     |  18     ----------------------------<  97     3.3     |  28     |  0.78     Ca    7.9        11 Jul 2018 04:37      PT/INR - ( 10 Jul 2018 07:09 )   PT: 12.6 sec;   INR: 1.15 ratio         PTT - ( 11 Jul 2018 04:53 )  PTT:84.4 sec

## 2018-07-11 NOTE — PROGRESS NOTE ADULT - SUBJECTIVE AND OBJECTIVE BOX
Per Dr. Villa on 7/10/18 (patient's hematologist):  IV heparin POD1. May begin Enoxaparin 100mg q12h POD2. Restart warfarin POD2.

## 2018-07-12 VITALS
OXYGEN SATURATION: 98 % | SYSTOLIC BLOOD PRESSURE: 125 MMHG | HEART RATE: 70 BPM | DIASTOLIC BLOOD PRESSURE: 86 MMHG | RESPIRATION RATE: 18 BRPM | TEMPERATURE: 98 F

## 2018-07-12 LAB
ANION GAP SERPL CALC-SCNC: 5 MMOL/L — SIGNIFICANT CHANGE UP (ref 5–17)
BUN SERPL-MCNC: 14 MG/DL — SIGNIFICANT CHANGE UP (ref 7–23)
CALCIUM SERPL-MCNC: 8.6 MG/DL — SIGNIFICANT CHANGE UP (ref 8.4–10.5)
CHLORIDE SERPL-SCNC: 100 MMOL/L — SIGNIFICANT CHANGE UP (ref 96–108)
CO2 SERPL-SCNC: 28 MMOL/L — SIGNIFICANT CHANGE UP (ref 22–31)
CREAT SERPL-MCNC: 0.81 MG/DL — SIGNIFICANT CHANGE UP (ref 0.5–1.3)
GLUCOSE SERPL-MCNC: 92 MG/DL — SIGNIFICANT CHANGE UP (ref 70–99)
HCT VFR BLD CALC: 29 % — LOW (ref 34.5–45)
HGB BLD-MCNC: 9.6 G/DL — LOW (ref 11.5–15.5)
INR BLD: 1.08 RATIO — SIGNIFICANT CHANGE UP (ref 0.88–1.16)
MCHC RBC-ENTMCNC: 21.9 PG — LOW (ref 27–34)
MCHC RBC-ENTMCNC: 33.1 GM/DL — SIGNIFICANT CHANGE UP (ref 32–36)
MCV RBC AUTO: 66.1 FL — LOW (ref 80–100)
NRBC # BLD: 0 /100 WBCS — SIGNIFICANT CHANGE UP (ref 0–0)
PLATELET # BLD AUTO: 282 K/UL — SIGNIFICANT CHANGE UP (ref 150–400)
POTASSIUM SERPL-MCNC: 4 MMOL/L — SIGNIFICANT CHANGE UP (ref 3.5–5.3)
POTASSIUM SERPL-SCNC: 4 MMOL/L — SIGNIFICANT CHANGE UP (ref 3.5–5.3)
PROTHROM AB SERPL-ACNC: 11.8 SEC — SIGNIFICANT CHANGE UP (ref 9.8–12.7)
RBC # BLD: 4.39 M/UL — SIGNIFICANT CHANGE UP (ref 3.8–5.2)
RBC # FLD: 16.9 % — HIGH (ref 10.3–14.5)
SODIUM SERPL-SCNC: 133 MMOL/L — LOW (ref 135–145)
WBC # BLD: 7.7 K/UL — SIGNIFICANT CHANGE UP (ref 3.8–10.5)
WBC # FLD AUTO: 7.7 K/UL — SIGNIFICANT CHANGE UP (ref 3.8–10.5)

## 2018-07-12 PROCEDURE — 85018 HEMOGLOBIN: CPT

## 2018-07-12 PROCEDURE — 93971 EXTREMITY STUDY: CPT

## 2018-07-12 PROCEDURE — 85610 PROTHROMBIN TIME: CPT

## 2018-07-12 PROCEDURE — 88311 DECALCIFY TISSUE: CPT

## 2018-07-12 PROCEDURE — 97116 GAIT TRAINING THERAPY: CPT

## 2018-07-12 PROCEDURE — C1713: CPT

## 2018-07-12 PROCEDURE — C1889: CPT

## 2018-07-12 PROCEDURE — 97535 SELF CARE MNGMENT TRAINING: CPT

## 2018-07-12 PROCEDURE — C1776: CPT

## 2018-07-12 PROCEDURE — 97530 THERAPEUTIC ACTIVITIES: CPT

## 2018-07-12 PROCEDURE — 88305 TISSUE EXAM BY PATHOLOGIST: CPT

## 2018-07-12 PROCEDURE — 85027 COMPLETE CBC AUTOMATED: CPT

## 2018-07-12 PROCEDURE — 85014 HEMATOCRIT: CPT

## 2018-07-12 PROCEDURE — 85730 THROMBOPLASTIN TIME PARTIAL: CPT

## 2018-07-12 PROCEDURE — 94640 AIRWAY INHALATION TREATMENT: CPT

## 2018-07-12 PROCEDURE — 99239 HOSP IP/OBS DSCHRG MGMT >30: CPT

## 2018-07-12 PROCEDURE — 73562 X-RAY EXAM OF KNEE 3: CPT

## 2018-07-12 PROCEDURE — 97110 THERAPEUTIC EXERCISES: CPT

## 2018-07-12 PROCEDURE — 36415 COLL VENOUS BLD VENIPUNCTURE: CPT

## 2018-07-12 PROCEDURE — 97165 OT EVAL LOW COMPLEX 30 MIN: CPT

## 2018-07-12 PROCEDURE — 97161 PT EVAL LOW COMPLEX 20 MIN: CPT

## 2018-07-12 PROCEDURE — 80048 BASIC METABOLIC PNL TOTAL CA: CPT

## 2018-07-12 RX ORDER — WARFARIN SODIUM 2.5 MG/1
1 TABLET ORAL
Qty: 0 | Refills: 0 | COMMUNITY
Start: 2018-07-12

## 2018-07-12 RX ORDER — ACETAMINOPHEN 500 MG
2 TABLET ORAL
Qty: 0 | Refills: 0 | DISCHARGE
Start: 2018-07-12

## 2018-07-12 RX ORDER — WARFARIN SODIUM 2.5 MG/1
7.5 TABLET ORAL ONCE
Qty: 0 | Refills: 0 | Status: DISCONTINUED | OUTPATIENT
Start: 2018-07-12 | End: 2018-07-12

## 2018-07-12 RX ORDER — OXYCODONE HYDROCHLORIDE 5 MG/1
1 TABLET ORAL
Qty: 0 | Refills: 0 | DISCHARGE
Start: 2018-07-12

## 2018-07-12 RX ORDER — WARFARIN SODIUM 2.5 MG/1
0 TABLET ORAL
Qty: 0 | Refills: 0 | COMMUNITY

## 2018-07-12 RX ORDER — ENOXAPARIN SODIUM 100 MG/ML
100 INJECTION SUBCUTANEOUS
Qty: 0 | Refills: 0 | DISCHARGE
Start: 2018-07-12

## 2018-07-12 RX ORDER — WARFARIN SODIUM 2.5 MG/1
5 TABLET ORAL ONCE
Qty: 0 | Refills: 0 | Status: DISCONTINUED | OUTPATIENT
Start: 2018-07-12 | End: 2018-07-12

## 2018-07-12 RX ORDER — WARFARIN SODIUM 2.5 MG/1
1 TABLET ORAL
Qty: 0 | Refills: 0 | DISCHARGE
Start: 2018-07-12

## 2018-07-12 RX ORDER — ENOXAPARIN SODIUM 100 MG/ML
100 INJECTION SUBCUTANEOUS
Qty: 0 | Refills: 0 | COMMUNITY
Start: 2018-07-12

## 2018-07-12 RX ADMIN — AMLODIPINE BESYLATE 5 MILLIGRAM(S): 2.5 TABLET ORAL at 06:27

## 2018-07-12 RX ADMIN — Medication 1000 MILLIGRAM(S): at 06:28

## 2018-07-12 RX ADMIN — Medication 100 MILLIGRAM(S): at 06:27

## 2018-07-12 RX ADMIN — BUDESONIDE AND FORMOTEROL FUMARATE DIHYDRATE 2 PUFF(S): 160; 4.5 AEROSOL RESPIRATORY (INHALATION) at 06:27

## 2018-07-12 RX ADMIN — PANTOPRAZOLE SODIUM 40 MILLIGRAM(S): 20 TABLET, DELAYED RELEASE ORAL at 12:42

## 2018-07-12 RX ADMIN — OXYCODONE HYDROCHLORIDE 10 MILLIGRAM(S): 5 TABLET ORAL at 09:02

## 2018-07-12 RX ADMIN — OXYCODONE HYDROCHLORIDE 10 MILLIGRAM(S): 5 TABLET ORAL at 01:56

## 2018-07-12 RX ADMIN — OXYCODONE HYDROCHLORIDE 10 MILLIGRAM(S): 5 TABLET ORAL at 09:32

## 2018-07-12 RX ADMIN — ENOXAPARIN SODIUM 100 MILLIGRAM(S): 100 INJECTION SUBCUTANEOUS at 09:01

## 2018-07-12 RX ADMIN — Medication 1000 MILLIGRAM(S): at 06:27

## 2018-07-12 RX ADMIN — Medication 325 MILLIGRAM(S): at 12:42

## 2018-07-12 RX ADMIN — OXYCODONE HYDROCHLORIDE 10 MILLIGRAM(S): 5 TABLET ORAL at 02:26

## 2018-07-12 RX ADMIN — Medication 150 MILLIGRAM(S): at 06:28

## 2018-07-12 NOTE — PROGRESS NOTE ADULT - PROBLEM SELECTOR PLAN 2
heparin drip for 24hrs then lovenox 100mg bid. start coumadin on 7/11.
started on lovenox, start heparin drip for 24hrs after surgery for 24 hrs then lovenox 100mg bid. start coumadin on 7/11.
s/p heparin drip for 24 hrs.  lovenox 100mg bid. started coumadin on 7/11.  coumadin as per pt/inr.

## 2018-07-12 NOTE — PROGRESS NOTE ADULT - SUBJECTIVE AND OBJECTIVE BOX
Patient is a 72y old  Female who presents with a chief complaint of pain right knee (09 Jul 2018 18:33)      INTERVAL HPI/OVERNIGHT EVENTS:  Pt is seen and examined.  pain is controlled.  Pain Location & Control:     MEDICATIONS  (STANDING):  acetaminophen   Tablet. 1000 milliGRAM(s) Oral every 12 hours  amLODIPine   Tablet 5 milliGRAM(s) Oral <User Schedule>  buDESOnide  80 MICROgram(s)/formoterol 4.5 MICROgram(s) Inhaler 2 Puff(s) Inhalation two times a day  docusate sodium 100 milliGRAM(s) Oral three times a day  enoxaparin Injectable 100 milliGRAM(s) SubCutaneous every 12 hours  ferrous    sulfate 325 milliGRAM(s) Oral daily  heparin  Infusion.  Unit(s)/Hr (10 mL/Hr) IV Continuous <Continuous>  pantoprazole    Tablet 40 milliGRAM(s) Oral daily  pregabalin 150 milliGRAM(s) Oral two times a day  senna 2 Tablet(s) Oral at bedtime  sodium chloride 0.9%. 1000 milliLiter(s) (75 mL/Hr) IV Continuous <Continuous>    MEDICATIONS  (PRN):  ALBUTerol    90 MICROgram(s) HFA Inhaler 2 Puff(s) Inhalation every 6 hours PRN Shortness of Breath and/or Wheezing  aluminum hydroxide/magnesium hydroxide/simethicone Suspension 30 milliLiter(s) Oral four times a day PRN Indigestion  benzocaine 15 mG/menthol 3.6 mG Lozenge 1 Lozenge Oral every 6 hours PRN Sore Throat  bisacodyl Suppository 10 milliGRAM(s) Rectal daily PRN If no bowel movement by postoperative day #2  HYDROmorphone  Injectable 0.5 milliGRAM(s) IV Push every 3 hours PRN Severe Pain (7 - 10)  magnesium hydroxide Suspension 30 milliLiter(s) Oral daily PRN Constipation  ondansetron Injectable 4 milliGRAM(s) IV Push every 6 hours PRN Nausea and/or Vomiting  oxyCODONE    IR 5 milliGRAM(s) Oral every 3 hours PRN Mild Pain (1 - 3)  oxyCODONE    IR 10 milliGRAM(s) Oral every 3 hours PRN Moderate Pain (4 - 6)  polyethylene glycol 3350 17 Gram(s) Oral daily PRN Constipation      Allergies    Iodides (Short breath)    Intolerances            Vital Signs Last 24 Hrs  T(C): 36.6 (12 Jul 2018 08:10), Max: 36.6 (11 Jul 2018 15:30)  T(F): 97.8 (12 Jul 2018 08:10), Max: 97.8 (11 Jul 2018 15:30)  HR: 70 (12 Jul 2018 08:10) (70 - 75)  BP: 125/86 (12 Jul 2018 08:10) (106/68 - 125/86)  BP(mean): --  RR: 18 (12 Jul 2018 08:10) (16 - 18)  SpO2: 98% (12 Jul 2018 08:10) (95% - 98%)        LABS:                        9.6    7.70  )-----------( 282      ( 12 Jul 2018 07:13 )             29.0     12 Jul 2018 07:13    133    |  100    |  14     ----------------------------<  92     4.0     |  28     |  0.81     Ca    8.6        12 Jul 2018 07:13      PT/INR - ( 12 Jul 2018 08:59 )   PT: 11.8 sec;   INR: 1.08 ratio         PTT - ( 11 Jul 2018 04:53 )  PTT:84.4 sec

## 2018-07-12 NOTE — PROGRESS NOTE ADULT - PROBLEM SELECTOR PROBLEM 4
Asthma, unspecified asthma severity, unspecified whether complicated, unspecified whether persistent

## 2018-07-12 NOTE — PROGRESS NOTE ADULT - PROBLEM SELECTOR PROBLEM 3
History of DVT (deep vein thrombosis)

## 2018-07-12 NOTE — PROGRESS NOTE ADULT - SUBJECTIVE AND OBJECTIVE BOX
ORTHOPEDIC PA PROGRESS NOTE  JAIDA THACKER      72y Female                                                                                                                               POD #    3    STATUS POST:               Pre-Op Dx: Primary localized osteoarthritis of right knee    Post-Op Dx:  Primary localized osteoarthritis of right knee    Procedure: Right total knee arthroplasty                                                Pain (0-10):  Pt reports 3/10 pain controlled with medication. Pt denies any CP, SOB, fever, chills, numbness/tingling. pt is positive void.   Current Pain Management:  [x ] Po Analgesics [x ] IM /IV Analgesics     T(F): 97.8  HR: 70  BP: 125/86  RR: 18  SpO2: 98%                         9.6    7.70  )-----------( 282      ( 12 Jul 2018 07:13 )             29.0         07-12    133<L>  |  100  |  14  ----------------------------<  92  4.0   |  28  |  0.81    Ca    8.6      12 Jul 2018 07:13      Physical Exam :    -   Dressing  C/D/I.   -   Distal Neurvascular status intact grossly.   -   Warm well perfused; capillary refill <3 seconds   -   (+)EHL/FHL   -   (+) Sensation to light touch  -   (-) Calf tenderness Bilaterally    A/P: 72y Female s/p Right total knee arthroplasty     -   Ortho Stable  -  Continue pt/ot  -   Pain control   -   Medicine to follow  -   DVT ppx:     [ x]SCDs     [ x] Lovenox -> Coumadin  -   Weight bearing status:  WBAT    -  Dispo:     Home when medically cleared ORTHOPEDIC PA PROGRESS NOTE  JAIDA THACKER      72y Female                                                                                                                               POD #    3    STATUS POST:               Pre-Op Dx: Primary localized osteoarthritis of right knee    Post-Op Dx:  Primary localized osteoarthritis of right knee    Procedure: Right total knee arthroplasty                                                Pain (0-10):  Pt reports 3/10 pain controlled with medication. Pt denies any CP, SOB, fever, chills, numbness/tingling. pt is positive void.   Current Pain Management:  [x ] Po Analgesics [x ] IM /IV Analgesics     T(F): 97.8  HR: 70  BP: 125/86  RR: 18  SpO2: 98%                         9.6    7.70  )-----------( 282      ( 12 Jul 2018 07:13 )             29.0         07-12    133<L>  |  100  |  14  ----------------------------<  92  4.0   |  28  |  0.81    Ca    8.6      12 Jul 2018 07:13      Physical Exam :    -   Dressing  C/D/I.   -   Distal Neurvascular status intact grossly.   -   Warm well perfused; capillary refill <3 seconds   -   (+)EHL/FHL   -   (+) Sensation to light touch  -   (-) Calf tenderness Bilaterally    A/P: 72y Female s/p Right total knee arthroplasty     -   Ortho Stable  -  Continue pt/ot  -   Pain control   -   Medicine to follow  -   DVT ppx:     [ x]SCDs     [ x] Lovenox -> Coumadin  -   Weight bearing status:  WBAT    -  Dispo:     Rehab when medically cleared

## 2018-07-12 NOTE — PROGRESS NOTE ADULT - ATTENDING COMMENTS
Plan of care was discuss with patient, all questions were answered, seems understand and in agreement.  Patient is medically optimized for discharge today pending PT/ortho clearance.

## 2018-07-12 NOTE — PROGRESS NOTE ADULT - PROBLEM SELECTOR PLAN 6
will check cbc 4pm and in am.  post op anemia.  feso4 1 tab daily.
will check cbc 4pm and in am.  post op anemia.  feso4 1 tab daily.

## 2018-07-12 NOTE — PROGRESS NOTE ADULT - PROBLEM SELECTOR PLAN 1
pain meds -Dilaudid, oxycodone. monitor for respiratory depression and lethargy.  PT/OT.  DVT prophylaxis.  DVT ppx: [ ]ASA81 [ ] [id number] [ x] Lovenox [x] Coumadin   [ ] Eliquis [  ] Heparin  Dispo:     Home [ ]     Acute Rehab [ ]     PARMINDER [ x]     TBD [ ].
pain meds -Dilaudid, oxycodone. monitor for respiratory depression and lethargy.  PT/OT.  DVT prophylaxis.  DVT ppx: [ ]ASA81 [ ] [id number] [ x] Lovenox [x] Coumadin   [ ] Eliquis [  ] Heparin  Dispo:     Home [ ]     Acute Rehab [ ]     PARMINDER [ x]     TBD [ ].
pain meds -Dilaudid, oxycodone. monitor for respiratory depression and lethargy.  PT/OT.  DVT prophylaxis.  DVT ppx: [ ]ASA81 [ ] [id number] [ x] Lovenox [x] Coumadin   [ ] Eliquis [  ] Heparin  Dispo:     Home [ ]     Acute Rehab [ ]     PARMINDER [ x]     TBD [ ].

## 2018-07-16 PROBLEM — D68.59 OTHER PRIMARY THROMBOPHILIA: Chronic | Status: INACTIVE | Noted: 2017-08-21 | Resolved: 2018-06-20

## 2018-07-16 PROBLEM — M79.671 PAIN IN RIGHT FOOT: Chronic | Status: INACTIVE | Noted: 2017-08-21 | Resolved: 2018-06-20

## 2018-07-16 PROBLEM — M35.3 POLYMYALGIA RHEUMATICA: Chronic | Status: INACTIVE | Noted: 2017-07-04 | Resolved: 2018-06-20

## 2018-07-23 ENCOUNTER — APPOINTMENT (OUTPATIENT)
Dept: ORTHOPEDIC SURGERY | Facility: CLINIC | Age: 73
End: 2018-07-23
Payer: MEDICARE

## 2018-07-23 VITALS
DIASTOLIC BLOOD PRESSURE: 77 MMHG | WEIGHT: 230 LBS | HEART RATE: 73 BPM | SYSTOLIC BLOOD PRESSURE: 120 MMHG | HEIGHT: 62 IN | BODY MASS INDEX: 42.33 KG/M2

## 2018-07-23 PROCEDURE — 73562 X-RAY EXAM OF KNEE 3: CPT | Mod: RT

## 2018-07-23 PROCEDURE — 99024 POSTOP FOLLOW-UP VISIT: CPT

## 2018-07-26 PROBLEM — Z86.711 PERSONAL HISTORY OF PULMONARY EMBOLISM: Chronic | Status: ACTIVE | Noted: 2018-06-20

## 2018-07-26 PROBLEM — M17.11 UNILATERAL PRIMARY OSTEOARTHRITIS, RIGHT KNEE: Chronic | Status: ACTIVE | Noted: 2018-06-20

## 2018-07-26 PROBLEM — M35.3 POLYMYALGIA RHEUMATICA: Chronic | Status: ACTIVE | Noted: 2018-06-20

## 2018-07-26 PROBLEM — R91.1 SOLITARY PULMONARY NODULE: Chronic | Status: ACTIVE | Noted: 2018-06-20

## 2018-07-26 PROBLEM — G62.9 POLYNEUROPATHY, UNSPECIFIED: Chronic | Status: ACTIVE | Noted: 2017-08-21

## 2018-07-26 PROBLEM — M25.471 EFFUSION, RIGHT ANKLE: Chronic | Status: ACTIVE | Noted: 2018-06-20

## 2018-07-26 PROBLEM — Z86.718 PERSONAL HISTORY OF OTHER VENOUS THROMBOSIS AND EMBOLISM: Chronic | Status: ACTIVE | Noted: 2018-06-20

## 2018-07-26 PROBLEM — M43.10 SPONDYLOLISTHESIS, SITE UNSPECIFIED: Chronic | Status: ACTIVE | Noted: 2017-08-21

## 2018-07-26 PROBLEM — Z95.828 PRESENCE OF OTHER VASCULAR IMPLANTS AND GRAFTS: Chronic | Status: ACTIVE | Noted: 2018-06-20

## 2018-07-26 PROBLEM — E66.01 MORBID (SEVERE) OBESITY DUE TO EXCESS CALORIES: Chronic | Status: ACTIVE | Noted: 2018-06-20

## 2018-07-26 PROBLEM — M19.90 UNSPECIFIED OSTEOARTHRITIS, UNSPECIFIED SITE: Chronic | Status: ACTIVE | Noted: 2017-08-21

## 2018-07-26 PROBLEM — M21.41 FLAT FOOT [PES PLANUS] (ACQUIRED), RIGHT FOOT: Chronic | Status: ACTIVE | Noted: 2017-08-21

## 2018-07-30 PROBLEM — N95.2 POSTMENOPAUSAL ATROPHIC VAGINITIS: Status: ACTIVE | Noted: 2018-03-09

## 2018-07-31 ENCOUNTER — LABORATORY RESULT (OUTPATIENT)
Age: 73
End: 2018-07-31

## 2018-07-31 ENCOUNTER — APPOINTMENT (OUTPATIENT)
Dept: PULMONOLOGY | Facility: CLINIC | Age: 73
End: 2018-07-31
Payer: MEDICARE

## 2018-07-31 VITALS — DIASTOLIC BLOOD PRESSURE: 88 MMHG | OXYGEN SATURATION: 99 % | SYSTOLIC BLOOD PRESSURE: 155 MMHG | HEART RATE: 67 BPM

## 2018-07-31 VITALS — DIASTOLIC BLOOD PRESSURE: 80 MMHG | SYSTOLIC BLOOD PRESSURE: 132 MMHG

## 2018-07-31 LAB — INR PPP: 1.8 RATIO

## 2018-07-31 PROCEDURE — 99213 OFFICE O/P EST LOW 20 MIN: CPT | Mod: 25

## 2018-07-31 PROCEDURE — 85610 PROTHROMBIN TIME: CPT | Mod: QW

## 2018-07-31 RX ORDER — ENOXAPARIN SODIUM 100 MG/ML
100 INJECTION SUBCUTANEOUS
Qty: 10 | Refills: 1 | Status: DISCONTINUED | COMMUNITY
Start: 2018-06-18 | End: 2018-07-31

## 2018-08-01 LAB
ALBUMIN SERPL ELPH-MCNC: 4.1 G/DL
ALP BLD-CCNC: 116 U/L
ALT SERPL-CCNC: 10 U/L
ANION GAP SERPL CALC-SCNC: 17 MMOL/L
AST SERPL-CCNC: 18 U/L
BASOPHILS # BLD AUTO: 0.1 K/UL
BASOPHILS NFR BLD AUTO: 1.7 %
BILIRUB SERPL-MCNC: 0.3 MG/DL
BUN SERPL-MCNC: 13 MG/DL
CALCIUM SERPL-MCNC: 9.3 MG/DL
CHLORIDE SERPL-SCNC: 91 MMOL/L
CO2 SERPL-SCNC: 25 MMOL/L
CREAT SERPL-MCNC: 0.68 MG/DL
EOSINOPHIL # BLD AUTO: 0.16 K/UL
EOSINOPHIL NFR BLD AUTO: 2.6 %
GLUCOSE SERPL-MCNC: 80 MG/DL
HCT VFR BLD CALC: 31.5 %
HGB BLD-MCNC: 9.9 G/DL
LYMPHOCYTES # BLD AUTO: 2.2 K/UL
LYMPHOCYTES NFR BLD AUTO: 36.5 %
MAN DIFF?: NORMAL
MCHC RBC-ENTMCNC: 21.7 PG
MCHC RBC-ENTMCNC: 31.4 GM/DL
MCV RBC AUTO: 68.9 FL
MONOCYTES # BLD AUTO: 0.47 K/UL
MONOCYTES NFR BLD AUTO: 7.8 %
NEUTROPHILS # BLD AUTO: 3.1 K/UL
NEUTROPHILS NFR BLD AUTO: 51.4 %
PLATELET # BLD AUTO: 508 K/UL
POTASSIUM SERPL-SCNC: 4 MMOL/L
PROT SERPL-MCNC: 6.9 G/DL
RBC # BLD: 4.57 M/UL
RBC # FLD: 18.6 %
SODIUM SERPL-SCNC: 133 MMOL/L
WBC # FLD AUTO: 6.04 K/UL

## 2018-08-09 ENCOUNTER — APPOINTMENT (OUTPATIENT)
Dept: PULMONOLOGY | Facility: CLINIC | Age: 73
End: 2018-08-09
Payer: MEDICARE

## 2018-08-09 ENCOUNTER — RECORD ABSTRACTING (OUTPATIENT)
Age: 73
End: 2018-08-09

## 2018-08-09 VITALS
WEIGHT: 227 LBS | HEIGHT: 62 IN | OXYGEN SATURATION: 98 % | RESPIRATION RATE: 16 BRPM | DIASTOLIC BLOOD PRESSURE: 81 MMHG | SYSTOLIC BLOOD PRESSURE: 139 MMHG | BODY MASS INDEX: 41.77 KG/M2 | TEMPERATURE: 97.9 F | HEART RATE: 69 BPM

## 2018-08-09 DIAGNOSIS — M17.10 UNILATERAL PRIMARY OSTEOARTHRITIS, UNSPECIFIED KNEE: ICD-10-CM

## 2018-08-09 DIAGNOSIS — I44.0 ATRIOVENTRICULAR BLOCK, FIRST DEGREE: ICD-10-CM

## 2018-08-09 DIAGNOSIS — M19.012 PRIMARY OSTEOARTHRITIS, RIGHT SHOULDER: ICD-10-CM

## 2018-08-09 DIAGNOSIS — M19.011 PRIMARY OSTEOARTHRITIS, RIGHT SHOULDER: ICD-10-CM

## 2018-08-09 DIAGNOSIS — Z95.828 PRESENCE OF OTHER VASCULAR IMPLANTS AND GRAFTS: ICD-10-CM

## 2018-08-09 LAB — INR PPP: 2.5 RATIO

## 2018-08-09 PROCEDURE — 85610 PROTHROMBIN TIME: CPT | Mod: QW

## 2018-08-09 PROCEDURE — 99212 OFFICE O/P EST SF 10 MIN: CPT | Mod: 25

## 2018-08-16 ENCOUNTER — APPOINTMENT (OUTPATIENT)
Dept: PULMONOLOGY | Facility: CLINIC | Age: 73
End: 2018-08-16
Payer: MEDICARE

## 2018-08-16 VITALS
DIASTOLIC BLOOD PRESSURE: 71 MMHG | SYSTOLIC BLOOD PRESSURE: 126 MMHG | HEART RATE: 67 BPM | OXYGEN SATURATION: 98 % | RESPIRATION RATE: 12 BRPM | TEMPERATURE: 97.4 F

## 2018-08-16 LAB — INR PPP: 2.3 RATIO

## 2018-08-16 PROCEDURE — 85610 PROTHROMBIN TIME: CPT | Mod: QW

## 2018-08-16 PROCEDURE — 99212 OFFICE O/P EST SF 10 MIN: CPT | Mod: 25

## 2018-08-30 ENCOUNTER — APPOINTMENT (OUTPATIENT)
Dept: PULMONOLOGY | Facility: CLINIC | Age: 73
End: 2018-08-30
Payer: MEDICARE

## 2018-08-30 VITALS
TEMPERATURE: 98.7 F | DIASTOLIC BLOOD PRESSURE: 73 MMHG | HEART RATE: 71 BPM | SYSTOLIC BLOOD PRESSURE: 124 MMHG | RESPIRATION RATE: 16 BRPM | OXYGEN SATURATION: 94 %

## 2018-08-30 LAB — INR PPP: 2.8 RATIO

## 2018-08-30 PROCEDURE — 99212 OFFICE O/P EST SF 10 MIN: CPT | Mod: 25

## 2018-08-30 PROCEDURE — 85610 PROTHROMBIN TIME: CPT | Mod: QW

## 2018-08-31 RX ORDER — IRON/IRON ASP GLY/FA/MV-MIN 38 125-25-1MG
TABLET ORAL
Refills: 0 | Status: DISCONTINUED | COMMUNITY

## 2018-09-18 ENCOUNTER — APPOINTMENT (OUTPATIENT)
Dept: ORTHOPEDIC SURGERY | Facility: CLINIC | Age: 73
End: 2018-09-18
Payer: MEDICARE

## 2018-09-18 VITALS — BODY MASS INDEX: 41.77 KG/M2 | WEIGHT: 227 LBS | HEIGHT: 62 IN

## 2018-09-18 DIAGNOSIS — Z98.890 OTHER SPECIFIED POSTPROCEDURAL STATES: ICD-10-CM

## 2018-09-18 DIAGNOSIS — Z96.651 PRESENCE OF RIGHT ARTIFICIAL KNEE JOINT: ICD-10-CM

## 2018-09-18 PROCEDURE — 99024 POSTOP FOLLOW-UP VISIT: CPT

## 2018-09-18 PROCEDURE — 73610 X-RAY EXAM OF ANKLE: CPT | Mod: RT

## 2018-09-20 ENCOUNTER — FORM ENCOUNTER (OUTPATIENT)
Age: 73
End: 2018-09-20

## 2018-09-20 ENCOUNTER — LABORATORY RESULT (OUTPATIENT)
Age: 73
End: 2018-09-20

## 2018-09-20 ENCOUNTER — APPOINTMENT (OUTPATIENT)
Dept: PULMONOLOGY | Facility: CLINIC | Age: 73
End: 2018-09-20
Payer: MEDICARE

## 2018-09-20 VITALS
HEART RATE: 85 BPM | RESPIRATION RATE: 16 BRPM | DIASTOLIC BLOOD PRESSURE: 82 MMHG | SYSTOLIC BLOOD PRESSURE: 144 MMHG | OXYGEN SATURATION: 90 %

## 2018-09-20 LAB
INR PPP: 2.2 RATIO
QUALITY CONTROL: YES

## 2018-09-20 PROCEDURE — 36415 COLL VENOUS BLD VENIPUNCTURE: CPT

## 2018-09-20 PROCEDURE — 85610 PROTHROMBIN TIME: CPT | Mod: QW

## 2018-09-20 PROCEDURE — 99214 OFFICE O/P EST MOD 30 MIN: CPT | Mod: 25

## 2018-09-21 ENCOUNTER — APPOINTMENT (OUTPATIENT)
Dept: ULTRASOUND IMAGING | Facility: CLINIC | Age: 73
End: 2018-09-21
Payer: MEDICARE

## 2018-09-21 ENCOUNTER — OUTPATIENT (OUTPATIENT)
Dept: OUTPATIENT SERVICES | Facility: HOSPITAL | Age: 73
LOS: 1 days | End: 2018-09-21
Payer: MEDICARE

## 2018-09-21 DIAGNOSIS — I83.93 ASYMPTOMATIC VARICOSE VEINS OF BILATERAL LOWER EXTREMITIES: Chronic | ICD-10-CM

## 2018-09-21 DIAGNOSIS — Z98.890 OTHER SPECIFIED POSTPROCEDURAL STATES: Chronic | ICD-10-CM

## 2018-09-21 DIAGNOSIS — Z98.89 OTHER SPECIFIED POSTPROCEDURAL STATES: Chronic | ICD-10-CM

## 2018-09-21 DIAGNOSIS — Z98.1 ARTHRODESIS STATUS: Chronic | ICD-10-CM

## 2018-09-21 DIAGNOSIS — Z96.651 PRESENCE OF RIGHT ARTIFICIAL KNEE JOINT: Chronic | ICD-10-CM

## 2018-09-21 DIAGNOSIS — Z90.49 ACQUIRED ABSENCE OF OTHER SPECIFIED PARTS OF DIGESTIVE TRACT: Chronic | ICD-10-CM

## 2018-09-21 DIAGNOSIS — Z00.8 ENCOUNTER FOR OTHER GENERAL EXAMINATION: ICD-10-CM

## 2018-09-21 LAB
ALBUMIN SERPL ELPH-MCNC: 4.4 G/DL
ALP BLD-CCNC: 94 U/L
ALT SERPL-CCNC: 10 U/L
ANION GAP SERPL CALC-SCNC: 14 MMOL/L
AST SERPL-CCNC: 16 U/L
BASOPHILS # BLD AUTO: 0.06 K/UL
BASOPHILS NFR BLD AUTO: 1 %
BILIRUB SERPL-MCNC: 0.2 MG/DL
BUN SERPL-MCNC: 19 MG/DL
CALCIUM SERPL-MCNC: 9.3 MG/DL
CHLORIDE SERPL-SCNC: 94 MMOL/L
CO2 SERPL-SCNC: 27 MMOL/L
CREAT SERPL-MCNC: 0.84 MG/DL
EOSINOPHIL # BLD AUTO: 0.17 K/UL
EOSINOPHIL NFR BLD AUTO: 2.8 %
GLUCOSE SERPL-MCNC: 87 MG/DL
HCT VFR BLD CALC: 32.6 %
HGB BLD-MCNC: 10.9 G/DL
IMM GRANULOCYTES NFR BLD AUTO: 0.6 %
LYMPHOCYTES # BLD AUTO: 1.99 K/UL
LYMPHOCYTES NFR BLD AUTO: 32.2 %
MAN DIFF?: NORMAL
MCHC RBC-ENTMCNC: 22.3 PG
MCHC RBC-ENTMCNC: 33.4 GM/DL
MCV RBC AUTO: 66.7 FL
MONOCYTES # BLD AUTO: 0.64 K/UL
MONOCYTES NFR BLD AUTO: 10.4 %
NEUTROPHILS # BLD AUTO: 3.28 K/UL
NEUTROPHILS NFR BLD AUTO: 53 %
PLATELET # BLD AUTO: 386 K/UL
POTASSIUM SERPL-SCNC: 3.5 MMOL/L
PROT SERPL-MCNC: 7.2 G/DL
RBC # BLD: 4.89 M/UL
RBC # FLD: 17.2 %
SODIUM SERPL-SCNC: 135 MMOL/L
WBC # FLD AUTO: 6.18 K/UL

## 2018-09-21 PROCEDURE — 93970 EXTREMITY STUDY: CPT | Mod: 26

## 2018-09-21 PROCEDURE — 93970 EXTREMITY STUDY: CPT

## 2018-10-18 ENCOUNTER — LABORATORY RESULT (OUTPATIENT)
Age: 73
End: 2018-10-18

## 2018-10-18 ENCOUNTER — APPOINTMENT (OUTPATIENT)
Dept: PULMONOLOGY | Facility: CLINIC | Age: 73
End: 2018-10-18
Payer: MEDICARE

## 2018-10-18 VITALS
OXYGEN SATURATION: 97 % | BODY MASS INDEX: 41.77 KG/M2 | WEIGHT: 227 LBS | DIASTOLIC BLOOD PRESSURE: 84 MMHG | HEIGHT: 62 IN | TEMPERATURE: 97.4 F | HEART RATE: 70 BPM | RESPIRATION RATE: 16 BRPM | SYSTOLIC BLOOD PRESSURE: 133 MMHG

## 2018-10-18 LAB — INR PPP: 2.3 RATIO

## 2018-10-18 PROCEDURE — 99213 OFFICE O/P EST LOW 20 MIN: CPT | Mod: 25

## 2018-10-18 PROCEDURE — G0008: CPT

## 2018-10-18 PROCEDURE — 85610 PROTHROMBIN TIME: CPT | Mod: QW

## 2018-10-18 PROCEDURE — 90662 IIV NO PRSV INCREASED AG IM: CPT

## 2018-10-22 ENCOUNTER — EMERGENCY (EMERGENCY)
Facility: HOSPITAL | Age: 73
LOS: 1 days | Discharge: HOME | End: 2018-10-22
Admitting: EMERGENCY MEDICINE
Payer: COMMERCIAL

## 2018-10-22 VITALS
OXYGEN SATURATION: 99 % | DIASTOLIC BLOOD PRESSURE: 71 MMHG | HEART RATE: 69 BPM | RESPIRATION RATE: 16 BRPM | TEMPERATURE: 98 F | SYSTOLIC BLOOD PRESSURE: 149 MMHG

## 2018-10-22 DIAGNOSIS — Z98.890 OTHER SPECIFIED POSTPROCEDURAL STATES: Chronic | ICD-10-CM

## 2018-10-22 DIAGNOSIS — I83.93 ASYMPTOMATIC VARICOSE VEINS OF BILATERAL LOWER EXTREMITIES: Chronic | ICD-10-CM

## 2018-10-22 DIAGNOSIS — Z98.1 ARTHRODESIS STATUS: Chronic | ICD-10-CM

## 2018-10-22 DIAGNOSIS — Z96.651 PRESENCE OF RIGHT ARTIFICIAL KNEE JOINT: Chronic | ICD-10-CM

## 2018-10-22 DIAGNOSIS — Z98.89 OTHER SPECIFIED POSTPROCEDURAL STATES: Chronic | ICD-10-CM

## 2018-10-22 DIAGNOSIS — Z90.49 ACQUIRED ABSENCE OF OTHER SPECIFIED PARTS OF DIGESTIVE TRACT: Chronic | ICD-10-CM

## 2018-10-22 PROCEDURE — 73562 X-RAY EXAM OF KNEE 3: CPT | Mod: 26,RT

## 2018-10-22 PROCEDURE — 99053 MED SERV 10PM-8AM 24 HR FAC: CPT

## 2018-10-22 PROCEDURE — 99283 EMERGENCY DEPT VISIT LOW MDM: CPT | Mod: 25

## 2018-10-22 RX ORDER — ACETAMINOPHEN 500 MG
650 TABLET ORAL ONCE
Qty: 0 | Refills: 0 | Status: COMPLETED | OUTPATIENT
Start: 2018-10-22 | End: 2018-10-22

## 2018-10-22 RX ADMIN — Medication 650 MILLIGRAM(S): at 01:09

## 2018-10-22 NOTE — ED PROVIDER NOTE - CARE PLAN
Principal Discharge DX:	Knee pain  Assessment and plan of treatment:	pls rest, drink plenty of fluids, tylenol for pain, use ace wrap as much as possible, f/u with  yoruorthopedist, return for any worsneing symptoms or any other concerning symptosm

## 2018-10-22 NOTE — ED ADULT NURSE NOTE - PMH
Asthma  well controlled, no recent hospitalizations  Flat feet, bilateral    [place] filter in place  2005  History of DVT (deep vein thrombosis)  2005, 2014, 2015  History of pulmonary embolism  2004  HTN (hypertension)    Lung nodule    Neuropathy  right foot  Obesity, morbid, BMI 40.0-49.9    Osteoarthritis    Osteoarthritis of right knee    PE (pulmonary embolism)  s/p [place] Filter- 2005  Polymyalgia rheumatica    Spondylisthesis    Swollen ankles    Thalassemia, beta

## 2018-10-22 NOTE — ED ADULT NURSE REASSESSMENT NOTE - GENERAL PATIENT STATE
improvement verbalized/family/SO at bedside/comfortable appearance/cooperative/resting/sleeping/smiling/interactive

## 2018-10-22 NOTE — ED PROVIDER NOTE - PMH
Asthma  well controlled, no recent hospitalizations  Flat feet, bilateral    [place] filter in place  2005  History of DVT (deep vein thrombosis)  2005, 2014, 2015  History of pulmonary embolism  2004  HTN (hypertension)    Lung nodule    Neuropathy  right foot  Obesity, morbid, BMI 40.0-49.9    Osteoarthritis    Osteoarthritis of right knee    PE (pulmonary embolism)  s/p [place] Filter- 2005  Polymyalgia rheumatica    Spondylisthesis    Swollen ankles    Thalassemia, beta
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
## 2018-10-22 NOTE — ED ADULT NURSE NOTE - INTERVENTIONS DEFINITIONS
Monitor gait and stability/Physically safe environment: no spills, clutter or unnecessary equipment/Non-slip footwear when patient is off stretcher/Call bell, personal items and telephone within reach/Stretcher in lowest position, wheels locked, appropriate side rails in place

## 2018-10-22 NOTE — ED ADULT NURSE REASSESSMENT NOTE - NS ED NURSE REASSESS COMMENT FT1
Pt resting comfortably in bed, states pain has improved, awaiting xray results, will continue to monitor.

## 2018-10-22 NOTE — ED ADULT NURSE NOTE - PSH
[place] filter in place  2005  H/O eye surgery  left retinal tear, 2012  History of ankle fusion  right, 2017  History of appendectomy  2015  History of appendectomy    History of hernia surgery  2013  History of total right knee replacement    S/P hip replacement  bilateral, 2008, 2009  S/P hysterectomy  1999  Varicose vein of leg  cauterization left leg vein 1989
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
## 2018-10-22 NOTE — ED PROVIDER NOTE - PSH
[place] filter in place  2005  H/O eye surgery  left retinal tear, 2012  History of ankle fusion  right, 2017  History of appendectomy  2015  History of appendectomy    History of hernia surgery  2013  History of total right knee replacement    S/P hip replacement  bilateral, 2008, 2009  S/P hysterectomy  1999  Varicose vein of leg  cauterization left leg vein 1989

## 2018-10-22 NOTE — ED PROVIDER NOTE - PLAN OF CARE
pls rest, drink plenty of fluids, tylenol for pain, use ace wrap as much as possible, f/u with  yoruorthopedist, return for any worsneing symptoms or any other concerning symptosm

## 2018-10-30 LAB
ANION GAP SERPL CALC-SCNC: 20 MMOL/L
BASOPHILS # BLD AUTO: 0.05 K/UL
BASOPHILS NFR BLD AUTO: 0.8 %
BUN SERPL-MCNC: 14 MG/DL
CALCIUM SERPL-MCNC: 9.6 MG/DL
CHLORIDE SERPL-SCNC: 93 MMOL/L
CO2 SERPL-SCNC: 22 MMOL/L
CREAT SERPL-MCNC: 0.7 MG/DL
EOSINOPHIL # BLD AUTO: 0.09 K/UL
EOSINOPHIL NFR BLD AUTO: 1.5 %
GLUCOSE SERPL-MCNC: 84 MG/DL
HCT VFR BLD CALC: 32.5 %
HGB BLD-MCNC: 10.5 G/DL
IMM GRANULOCYTES NFR BLD AUTO: 0.5 %
LYMPHOCYTES # BLD AUTO: 2 K/UL
LYMPHOCYTES NFR BLD AUTO: 32.6 %
MAN DIFF?: NORMAL
MCHC RBC-ENTMCNC: 21 PG
MCHC RBC-ENTMCNC: 32.3 GM/DL
MCV RBC AUTO: 64.9 FL
MONOCYTES # BLD AUTO: 0.59 K/UL
MONOCYTES NFR BLD AUTO: 9.6 %
NEUTROPHILS # BLD AUTO: 3.37 K/UL
NEUTROPHILS NFR BLD AUTO: 55 %
PLATELET # BLD AUTO: 418 K/UL
POTASSIUM SERPL-SCNC: 3.8 MMOL/L
RBC # BLD: 5.01 M/UL
RBC # FLD: 16.9 %
SODIUM SERPL-SCNC: 136 MMOL/L
WBC # FLD AUTO: 6.13 K/UL

## 2018-11-05 ENCOUNTER — FORM ENCOUNTER (OUTPATIENT)
Age: 73
End: 2018-11-05

## 2018-11-06 ENCOUNTER — APPOINTMENT (OUTPATIENT)
Dept: CT IMAGING | Facility: IMAGING CENTER | Age: 73
End: 2018-11-06
Payer: MEDICARE

## 2018-11-06 ENCOUNTER — OUTPATIENT (OUTPATIENT)
Dept: OUTPATIENT SERVICES | Facility: HOSPITAL | Age: 73
LOS: 1 days | End: 2018-11-06
Payer: COMMERCIAL

## 2018-11-06 DIAGNOSIS — Z98.890 OTHER SPECIFIED POSTPROCEDURAL STATES: Chronic | ICD-10-CM

## 2018-11-06 DIAGNOSIS — Z98.89 OTHER SPECIFIED POSTPROCEDURAL STATES: Chronic | ICD-10-CM

## 2018-11-06 DIAGNOSIS — I83.93 ASYMPTOMATIC VARICOSE VEINS OF BILATERAL LOWER EXTREMITIES: Chronic | ICD-10-CM

## 2018-11-06 DIAGNOSIS — Z90.49 ACQUIRED ABSENCE OF OTHER SPECIFIED PARTS OF DIGESTIVE TRACT: Chronic | ICD-10-CM

## 2018-11-06 DIAGNOSIS — Z98.1 ARTHRODESIS STATUS: Chronic | ICD-10-CM

## 2018-11-06 DIAGNOSIS — Z96.651 PRESENCE OF RIGHT ARTIFICIAL KNEE JOINT: Chronic | ICD-10-CM

## 2018-11-06 DIAGNOSIS — R91.8 OTHER NONSPECIFIC ABNORMAL FINDING OF LUNG FIELD: ICD-10-CM

## 2018-11-06 PROCEDURE — 71250 CT THORAX DX C-: CPT | Mod: 26

## 2018-11-06 PROCEDURE — 71250 CT THORAX DX C-: CPT

## 2018-11-08 ENCOUNTER — RX RENEWAL (OUTPATIENT)
Age: 73
End: 2018-11-08

## 2018-11-13 ENCOUNTER — APPOINTMENT (OUTPATIENT)
Dept: PULMONOLOGY | Facility: CLINIC | Age: 73
End: 2018-11-13
Payer: MEDICARE

## 2018-11-13 VITALS
SYSTOLIC BLOOD PRESSURE: 114 MMHG | BODY MASS INDEX: 39.75 KG/M2 | DIASTOLIC BLOOD PRESSURE: 76 MMHG | WEIGHT: 216 LBS | TEMPERATURE: 97.6 F | HEIGHT: 62 IN | HEART RATE: 61 BPM | OXYGEN SATURATION: 94 % | RESPIRATION RATE: 16 BRPM

## 2018-11-13 DIAGNOSIS — Z82.49 FAMILY HISTORY OF ISCHEMIC HEART DISEASE AND OTHER DISEASES OF THE CIRCULATORY SYSTEM: ICD-10-CM

## 2018-11-13 LAB — INR PPP: 2.6 RATIO

## 2018-11-13 PROCEDURE — 85610 PROTHROMBIN TIME: CPT | Mod: QW

## 2018-11-13 PROCEDURE — 99213 OFFICE O/P EST LOW 20 MIN: CPT | Mod: 25

## 2018-11-14 PROBLEM — Z82.49 FAMILY HISTORY OF PULMONARY EMBOLISM: Status: ACTIVE | Noted: 2018-11-14

## 2018-11-14 PROBLEM — Z82.49 FAMILY HISTORY OF PULMONARY HYPERTENSION: Status: ACTIVE | Noted: 2018-11-14

## 2018-11-28 ENCOUNTER — OTHER (OUTPATIENT)
Age: 73
End: 2018-11-28

## 2018-11-29 ENCOUNTER — OTHER (OUTPATIENT)
Age: 73
End: 2018-11-29

## 2018-11-30 ENCOUNTER — RX RENEWAL (OUTPATIENT)
Age: 73
End: 2018-11-30

## 2018-12-11 ENCOUNTER — APPOINTMENT (OUTPATIENT)
Dept: PULMONOLOGY | Facility: CLINIC | Age: 73
End: 2018-12-11
Payer: MEDICARE

## 2018-12-11 VITALS
OXYGEN SATURATION: 99 % | SYSTOLIC BLOOD PRESSURE: 134 MMHG | RESPIRATION RATE: 16 BRPM | HEART RATE: 59 BPM | DIASTOLIC BLOOD PRESSURE: 80 MMHG

## 2018-12-11 DIAGNOSIS — R07.89 OTHER CHEST PAIN: ICD-10-CM

## 2018-12-11 DIAGNOSIS — L29.8 OTHER PRURITUS: ICD-10-CM

## 2018-12-11 LAB
INR PPP: 2.2 RATIO
QUALITY CONTROL: YES

## 2018-12-11 PROCEDURE — 85610 PROTHROMBIN TIME: CPT | Mod: QW

## 2018-12-11 PROCEDURE — 99213 OFFICE O/P EST LOW 20 MIN: CPT

## 2018-12-12 PROBLEM — R07.89 ATYPICAL CHEST PAIN: Status: ACTIVE | Noted: 2018-12-12

## 2018-12-12 NOTE — PATIENT PROFILE ADULT. - NS PREOP MEDICATION GIVEN
Patient's O2 saturation noted to be 85 % on 3LNC after patient eating breakfast. Patient encouraged to cough, patient states she feels like she has a lot of phlegm that she cannot bring up. yes

## 2018-12-26 ENCOUNTER — APPOINTMENT (OUTPATIENT)
Dept: RHEUMATOLOGY | Facility: CLINIC | Age: 73
End: 2018-12-26

## 2019-01-10 ENCOUNTER — APPOINTMENT (OUTPATIENT)
Dept: RHEUMATOLOGY | Facility: CLINIC | Age: 74
End: 2019-01-10

## 2019-01-11 ENCOUNTER — APPOINTMENT (OUTPATIENT)
Dept: PULMONOLOGY | Facility: CLINIC | Age: 74
End: 2019-01-11
Payer: MEDICARE

## 2019-01-11 VITALS
OXYGEN SATURATION: 100 % | DIASTOLIC BLOOD PRESSURE: 87 MMHG | RESPIRATION RATE: 16 BRPM | TEMPERATURE: 97.8 F | HEART RATE: 58 BPM | SYSTOLIC BLOOD PRESSURE: 157 MMHG

## 2019-01-11 LAB — INR PPP: 2.2 RATIO

## 2019-01-11 PROCEDURE — 99212 OFFICE O/P EST SF 10 MIN: CPT | Mod: 25

## 2019-01-11 PROCEDURE — 85610 PROTHROMBIN TIME: CPT | Mod: QW

## 2019-01-25 ENCOUNTER — RX RENEWAL (OUTPATIENT)
Age: 74
End: 2019-01-25

## 2019-01-28 ENCOUNTER — LABORATORY RESULT (OUTPATIENT)
Age: 74
End: 2019-01-28

## 2019-01-28 ENCOUNTER — APPOINTMENT (OUTPATIENT)
Dept: RHEUMATOLOGY | Facility: CLINIC | Age: 74
End: 2019-01-28
Payer: MEDICARE

## 2019-01-28 VITALS
SYSTOLIC BLOOD PRESSURE: 150 MMHG | OXYGEN SATURATION: 98 % | RESPIRATION RATE: 16 BRPM | HEIGHT: 62 IN | WEIGHT: 215 LBS | HEART RATE: 60 BPM | DIASTOLIC BLOOD PRESSURE: 90 MMHG | TEMPERATURE: 97.9 F | BODY MASS INDEX: 39.56 KG/M2

## 2019-01-28 DIAGNOSIS — M25.571 PAIN IN RIGHT ANKLE AND JOINTS OF RIGHT FOOT: ICD-10-CM

## 2019-01-28 PROCEDURE — 99213 OFFICE O/P EST LOW 20 MIN: CPT

## 2019-01-29 ENCOUNTER — OTHER (OUTPATIENT)
Age: 74
End: 2019-01-29

## 2019-01-29 NOTE — HISTORY OF PRESENT ILLNESS
[FreeTextEntry1] : fuv \par s/p R ankle and R knee surgery \par still having pain\par anticipating L side as well \par \par lyrica 75mg bid \par c/o occasion hand pain\par \par on coumadin so avoids nsaids

## 2019-01-29 NOTE — PHYSICAL EXAM
[General Appearance - Alert] : alert [General Appearance - In No Acute Distress] : in no acute distress [FreeTextEntry1] : no tender or swollen joints   unchanged

## 2019-01-29 NOTE — ASSESSMENT
[FreeTextEntry1] : see HPI\par continue lyrica \par advised she may obtain Rx from PMD in the future \par labs\par fuv 6 mo, PRN\par patient is aware of my assessment and plans\par

## 2019-01-31 LAB
25(OH)D3 SERPL-MCNC: 35.1 NG/ML
ALBUMIN SERPL ELPH-MCNC: 4.4 G/DL
ALP BLD-CCNC: 105 U/L
ALT SERPL-CCNC: 16 U/L
ANION GAP SERPL CALC-SCNC: 12 MMOL/L
AST SERPL-CCNC: 21 U/L
BASOPHILS # BLD AUTO: 0.07 K/UL
BASOPHILS NFR BLD AUTO: 1.2 %
BILIRUB SERPL-MCNC: 0.3 MG/DL
BUN SERPL-MCNC: 15 MG/DL
CALCIUM SERPL-MCNC: 9.4 MG/DL
CHLORIDE SERPL-SCNC: 96 MMOL/L
CO2 SERPL-SCNC: 29 MMOL/L
CREAT SERPL-MCNC: 0.65 MG/DL
CRP SERPL-MCNC: 0.4 MG/DL
EOSINOPHIL # BLD AUTO: 0.11 K/UL
EOSINOPHIL NFR BLD AUTO: 1.9 %
ERYTHROCYTE [SEDIMENTATION RATE] IN BLOOD BY WESTERGREN METHOD: 74 MM/HR
GLUCOSE SERPL-MCNC: 80 MG/DL
HCT VFR BLD CALC: 34.9 %
HGB BLD-MCNC: 11.5 G/DL
IMM GRANULOCYTES NFR BLD AUTO: 0.9 %
LYMPHOCYTES # BLD AUTO: 2.3 K/UL
LYMPHOCYTES NFR BLD AUTO: 40 %
MAN DIFF?: NORMAL
MCHC RBC-ENTMCNC: 22.1 PG
MCHC RBC-ENTMCNC: 33 GM/DL
MCV RBC AUTO: 67 FL
MONOCYTES # BLD AUTO: 0.63 K/UL
MONOCYTES NFR BLD AUTO: 11 %
NEUTROPHILS # BLD AUTO: 2.59 K/UL
NEUTROPHILS NFR BLD AUTO: 45 %
PLATELET # BLD AUTO: 405 K/UL
POTASSIUM SERPL-SCNC: 3.9 MMOL/L
PROT SERPL-MCNC: 7.2 G/DL
RBC # BLD: 5.21 M/UL
RBC # FLD: 17.4 %
SODIUM SERPL-SCNC: 137 MMOL/L
WBC # FLD AUTO: 5.75 K/UL

## 2019-02-07 ENCOUNTER — APPOINTMENT (OUTPATIENT)
Dept: ORTHOPEDIC SURGERY | Facility: CLINIC | Age: 74
End: 2019-02-07
Payer: MEDICARE

## 2019-02-07 VITALS
HEART RATE: 63 BPM | WEIGHT: 218 LBS | SYSTOLIC BLOOD PRESSURE: 132 MMHG | DIASTOLIC BLOOD PRESSURE: 76 MMHG | BODY MASS INDEX: 40.12 KG/M2 | HEIGHT: 62 IN

## 2019-02-07 DIAGNOSIS — M25.561 PAIN IN RIGHT KNEE: ICD-10-CM

## 2019-02-07 DIAGNOSIS — Z96.651 PRESENCE OF RIGHT ARTIFICIAL KNEE JOINT: ICD-10-CM

## 2019-02-07 DIAGNOSIS — M17.12 UNILATERAL PRIMARY OSTEOARTHRITIS, LEFT KNEE: ICD-10-CM

## 2019-02-07 PROCEDURE — 99213 OFFICE O/P EST LOW 20 MIN: CPT

## 2019-02-07 PROCEDURE — 73562 X-RAY EXAM OF KNEE 3: CPT | Mod: 50

## 2019-02-07 PROCEDURE — 72170 X-RAY EXAM OF PELVIS: CPT

## 2019-02-11 PROBLEM — M17.12 PRIMARY LOCALIZED OSTEOARTHRITIS OF LEFT KNEE: Status: ACTIVE | Noted: 2019-02-11

## 2019-02-11 PROBLEM — Z96.651 STATUS POST TOTAL RIGHT KNEE REPLACEMENT: Status: ACTIVE | Noted: 2019-02-11

## 2019-02-11 NOTE — PHYSICAL EXAM
[Antalgic] : antalgic [LE] : Sensory: Intact in bilateral lower extremities [Knee] : patellar 2+ and symmetric bilaterally [Ankle] : ankle 2+ and symmetric bilaterally [DP] : dorsalis pedis 2+ and symmetric bilaterally [PT] : posterior tibial 2+ and symmetric bilaterally [de-identified] : On general examination the patient is adequately groomed and nourished. The patient is obese. The vital parameters are as recorded. \par There is bilateral LE lymphedema and diffuse swelling, most prominent around the ankles, and more severe on the LLE. There are varicose veins, no skin warmth/erythema/scars/swelling, no ulcers and no palpable lymph nodes or masses in both lower extremities. There is a rash along the lateral aspect of the right lateral incision, 2cm x 2cm. There is darkening of the skin, the rash is raised and dry, consistent with dermatitis. Bilateral pedal pulses are well palpable.\par Upper Extremity:\par Both right and left upper extremities are unremarkable in terms of skin rash, lesions, pigmentation, redness, tenderness, swelling, joint instability, abnormal deformity or crepitus. The overall range of motion, sensation, motor tone and strength testing are normal.\par \par Knee Exam\par The gait is left stiff knee antalgic.\par Knee alignment:            Right 5 degrees varus with no flexion deformity. \par Left 3 degrees valgus with mild flexion deformity.\par The right knee demonstrates well healed scar, with a rash along the lateral aspect of the distal incision, consistent with dermatitis. \par Both knees have a range of motion of\par Extension:                    Right 0 degrees             Left -5 degrees\par Flexion:                                   Right 110 degrees          Left 100 degrees\par Left Knee: There is lateral and medial joint line tenderness. There is mild effusion. \par Babita's test is positive. Madelyn test is positive.\par Lachman's test, Anterior/Posterior Drawer test and Pivot Shift Tests are negative. \par There is left knee grade 1 LCL mediolateral laxity and no anteroposterior instability. \par Patella compression test is negative and patellofemoral tracking is normal with no lateral subluxation, apprehension or instability. \par Right knee quadriceps and hamstrings power is 4+\par Left knee quadriceps and hamstrings power is 4+.\par \par Hip Exam:\par The gait and station is normal\par The patient has equal leg lengths and no pelvic tilt. Rosendo/Everardo test is 7 inches on the right and 7 inches on the left. Active SLR is 60 degrees on the right and 60 degrees on the left. Both hips demonstrate no scars and the skin has no signs of inflammation or tenderness. \par Both Hips have a normal range of motion of flexion to 100 degrees, abduction 40 degrees, adduction 20 degrees, external rotation 40 degrees, internal rotation 20 degrees with symmetrical motion in flexion and extension. There is no flexion contracture, deformity or instability. Labral impingement tests are negative.\par Both hips flexor, abductor and extensor power is normal.\par \par Neurology:\par The patient is alert and oriented in person, place and time. The mood is calm and affect is normal.\par Testing for coordination including Rhomberg's Test and Finger-Nose Test, sensation, motor tone and power and deep tendon reflexes in both lower extremities is normal.\par  [de-identified] : The following radiographs were ordered and read by me during this patients visit. I reviewed each radiograph in detail with the patient and discussed the findings as highlighted below. \par AP, lateral and skyline views of the bilateral knees confirm advanced degenerative joint disease with medial and lateral joint space narrowing and osteophyte formation of the left knee, with right knee status post right total knee replacement, with varus alignment \par AP view of the pelvis are within normal limits\par

## 2019-02-11 NOTE — CONSULT LETTER
[Dear  ___] : Dear  [unfilled], [Consult Letter:] : I had the pleasure of evaluating your patient, [unfilled]. [Please see my note below.] : Please see my note below. [Consult Closing:] : Thank you very much for allowing me to participate in the care of this patient.  If you have any questions, please do not hesitate to contact me. [Sincerely,] : Sincerely, [FreeTextEntry2] : ARYA BRICE\par \par  [FreeTextEntry3] : Dionicio Dick MD\par \par ______________________________________________\par [facility]: Hip/Knee Arthroplasty\par [address], [address], [place] [address]\par (t) [phone]\par (f) [phone]\par

## 2019-02-11 NOTE — HISTORY OF PRESENT ILLNESS
[Pain Location] : pain [Improving] : improving [___ yrs] : [unfilled] year(s) ago [0] : a current pain level of 0/10 [de-identified] : 73 year old female seen in this office approximately 2 years ago c/o right knee pain. Patient was told that she needed a TKR however she would need to loose weight prior to discuss surgery. She states that she wanted to have the knee surgery and did not want to wait until she lost weight so she went to another surgeon and had the knee replacement in July 2018. She notes she also underwent a triple\par Since that time she has joined weight watchers and has lost 50 pounds. She would like to be re evaluated for LTKR at this time. The pain is posterior left knee and worse with position changes and ambulation\par She currently ambulates with a walker.

## 2019-02-11 NOTE — DISCUSSION/SUMMARY
[de-identified] : Right total knee replacement, with dermatis on the right knee incision, consistent with possible metal allergy, left knee advanced degenerative joint disease, s/p triple arthrodesis right ankle with chronic lymphedema bilateral LE. \par The natural history and treatment of degenerative arthritis was discussed with the patient at length today. The spectrum of treatment including nonoperative modalities to surgical intervention was elucidated. Noninvasive and nonoperative treatment modalities include weight reduction, activity modification with low impact exercise,  as needed use of acetaminophen or anti-inflammatory medications if tolerated, glucosamine/chondroitin supplements, and physical therapy. Further treatments can include corticosteroid injection and the use of viscosupplementation with hyaluronic acid injections. Definitive surgical treatment can certainly include total joint arthroplasty also. The risks and benefits of each treatment options was discussed and all questions were answered. \par At this time we have discussed that the patient is not a candidate for left total knee replacement due to obesity, and chronic lymphedema bilateral LE. At this time we have discussed that she will need a a dermatology consult to rule out metal allergy due to a rash along the incision of the right knee.  I have recommended that she have a vascular evaluation due to the LE edema. Concerning the lymphedema, compression has been recommended, as well as elevation of the lower extremities. She  has been recommended for exercise, including foot pumps and walking.\par Concerning her ankle pain, I have recommended she continue follow up with Dr. Do regarding her continued symptoms. She has also been provided with a referral to Dr. Winston for a second opinion regarding her ankle.  I have recommended weight loss. The patient was recommended conservative management in the form of a home exercise program, activity modifications, stationary bicycling, swimming and weight loss program. A trial of Glucosamine and Chondroiten Sulphate was recommended.\par Follow-up appointment was recommended as needed. \par \par

## 2019-02-19 ENCOUNTER — APPOINTMENT (OUTPATIENT)
Dept: PULMONOLOGY | Facility: CLINIC | Age: 74
End: 2019-02-19
Payer: MEDICARE

## 2019-02-19 VITALS — HEART RATE: 63 BPM | OXYGEN SATURATION: 98 % | DIASTOLIC BLOOD PRESSURE: 83 MMHG | SYSTOLIC BLOOD PRESSURE: 137 MMHG

## 2019-02-19 LAB — INR PPP: 2.1 RATIO

## 2019-02-19 PROCEDURE — 85610 PROTHROMBIN TIME: CPT | Mod: QW

## 2019-02-19 PROCEDURE — 99212 OFFICE O/P EST SF 10 MIN: CPT | Mod: 25

## 2019-02-20 ENCOUNTER — RX RENEWAL (OUTPATIENT)
Age: 74
End: 2019-02-20

## 2019-02-21 NOTE — PHYSICAL EXAM
[General Appearance - Well Developed] : well developed [Normal Appearance] : normal appearance [Well Groomed] : well groomed [General Appearance - Well Nourished] : well nourished [No Deformities] : no deformities [General Appearance - In No Acute Distress] : no acute distress [Normal Conjunctiva] : the conjunctiva exhibited no abnormalities [Eyelids - No Xanthelasma] : the eyelids demonstrated no xanthelasmas [Normal Oropharynx] : normal oropharynx [Neck Appearance] : the appearance of the neck was normal [Neck Cervical Mass (___cm)] : no neck mass was observed [Jugular Venous Distention Increased] : there was no jugular-venous distention [Thyroid Diffuse Enlargement] : the thyroid was not enlarged [Thyroid Nodule] : there were no palpable thyroid nodules [Heart Rate And Rhythm] : heart rate and rhythm were normal [Heart Sounds] : normal S1 and S2 [Murmurs] : no murmurs present [Respiration, Rhythm And Depth] : normal respiratory rhythm and effort [Exaggerated Use Of Accessory Muscles For Inspiration] : no accessory muscle use [Auscultation Breath Sounds / Voice Sounds] : lungs were clear to auscultation bilaterally [Tenderness: ____] : tenderness [unfilled] [Abdomen Soft] : soft [Abdomen Tenderness] : non-tender [Abdomen Mass (___ Cm)] : no abdominal mass palpated [Nail Clubbing] : no clubbing of the fingernails [Cyanosis, Localized] : no localized cyanosis [Petechial Hemorrhages (___cm)] : no petechial hemorrhages [] : no ischemic changes [3+ Pitting] : 3+  pitting  [FreeTextEntry1] : exczema vs dry skim R hand [Deep Tendon Reflexes (DTR)] : deep tendon reflexes were 2+ and symmetric [Sensation] : the sensory exam was normal to light touch and pinprick [No Focal Deficits] : no focal deficits [Oriented To Time, Place, And Person] : oriented to person, place, and time [Impaired Insight] : insight and judgment were intact [Affect] : the affect was normal

## 2019-02-26 ENCOUNTER — APPOINTMENT (OUTPATIENT)
Dept: DERMATOLOGY | Facility: CLINIC | Age: 74
End: 2019-02-26
Payer: MEDICARE

## 2019-02-26 VITALS — SYSTOLIC BLOOD PRESSURE: 140 MMHG | DIASTOLIC BLOOD PRESSURE: 82 MMHG | WEIGHT: 210 LBS | BODY MASS INDEX: 38.41 KG/M2

## 2019-02-26 DIAGNOSIS — B35.2 TINEA MANUUM: ICD-10-CM

## 2019-02-26 DIAGNOSIS — L30.9 DERMATITIS, UNSPECIFIED: ICD-10-CM

## 2019-02-26 PROCEDURE — 99202 OFFICE O/P NEW SF 15 MIN: CPT

## 2019-02-26 NOTE — HISTORY OF PRESENT ILLNESS
[FreeTextEntry1] : Rash on right knee and left hand [de-identified] : 73 year old female is here for rash on right knee that started in July 2019. Patient had surgery on her right knee and her surgeon believes it might be related to the surgery.\par Pt also has a rash on left hand for months. Both have not been treated with precription medications.\par

## 2019-02-26 NOTE — ASSESSMENT
[FreeTextEntry1] : 1. Tinea Manuum - on left hand\par ketoconazole 2% cream twice daily for 4 weeks \par \par 2. Dermatitis \par dry skin care sheet given to patient, contact dermatitis likely related to topical agents used during or after surgery\par it has not been treated with topical steroids, betamethasone dipropionate ointment - thin film and rub into affected area for 2 weeks then switch over to a moisturizer listed on dry skin care sheet. Consider patch testing if not improved. \par \par If not improved in 3-4 weeks, return for reevaluation.

## 2019-02-26 NOTE — PHYSICAL EXAM
[FreeTextEntry3] : A&OX3, WDWN, Pleasant \par \par The following body parts were examined:\par  Face, Eyelids, Conjunctiva, Neck, Left Hand, Right lower anterior leg\par \par See assessment for physical description of findings\par

## 2019-03-19 ENCOUNTER — APPOINTMENT (OUTPATIENT)
Dept: PULMONOLOGY | Facility: CLINIC | Age: 74
End: 2019-03-19
Payer: MEDICARE

## 2019-03-19 VITALS
OXYGEN SATURATION: 98 % | RESPIRATION RATE: 14 BRPM | HEART RATE: 59 BPM | SYSTOLIC BLOOD PRESSURE: 131 MMHG | DIASTOLIC BLOOD PRESSURE: 82 MMHG | TEMPERATURE: 97.9 F

## 2019-03-19 LAB — INR PPP: 1.9 RATIO

## 2019-03-19 PROCEDURE — 99212 OFFICE O/P EST SF 10 MIN: CPT | Mod: 25

## 2019-03-19 PROCEDURE — 85610 PROTHROMBIN TIME: CPT | Mod: QW

## 2019-03-21 NOTE — HISTORY OF PRESENT ILLNESS
[FreeTextEntry1] : INR check\par \par Currently on 5 alt 7.5\par \par requested mammogram rx and gyn referral

## 2019-04-02 ENCOUNTER — APPOINTMENT (OUTPATIENT)
Dept: DERMATOLOGY | Facility: CLINIC | Age: 74
End: 2019-04-02

## 2019-04-02 ENCOUNTER — APPOINTMENT (OUTPATIENT)
Dept: PULMONOLOGY | Facility: CLINIC | Age: 74
End: 2019-04-02
Payer: MEDICARE

## 2019-04-02 VITALS
DIASTOLIC BLOOD PRESSURE: 79 MMHG | SYSTOLIC BLOOD PRESSURE: 143 MMHG | BODY MASS INDEX: 38.64 KG/M2 | OXYGEN SATURATION: 99 % | HEIGHT: 62 IN | HEART RATE: 61 BPM | RESPIRATION RATE: 16 BRPM | WEIGHT: 210 LBS

## 2019-04-02 DIAGNOSIS — J45.909 UNSPECIFIED ASTHMA, UNCOMPLICATED: ICD-10-CM

## 2019-04-02 PROCEDURE — 71046 X-RAY EXAM CHEST 2 VIEWS: CPT

## 2019-04-02 PROCEDURE — 99214 OFFICE O/P EST MOD 30 MIN: CPT | Mod: 25

## 2019-04-02 PROCEDURE — 94060 EVALUATION OF WHEEZING: CPT

## 2019-04-03 PROBLEM — J45.909 UNSPECIFIED ASTHMA, UNCOMPLICATED: Status: ACTIVE | Noted: 2018-08-09

## 2019-04-03 LAB
ALBUMIN SERPL ELPH-MCNC: 4.7 G/DL
ALP BLD-CCNC: 87 U/L
ALT SERPL-CCNC: 16 U/L
ANION GAP SERPL CALC-SCNC: 12 MMOL/L
AST SERPL-CCNC: 24 U/L
BASOPHILS # BLD AUTO: 0.06 K/UL
BASOPHILS NFR BLD AUTO: 1 %
BILIRUB SERPL-MCNC: 0.2 MG/DL
BUN SERPL-MCNC: 19 MG/DL
CALCIUM SERPL-MCNC: 9.6 MG/DL
CHLORIDE SERPL-SCNC: 90 MMOL/L
CO2 SERPL-SCNC: 29 MMOL/L
CREAT SERPL-MCNC: 0.75 MG/DL
EOSINOPHIL # BLD AUTO: 0.05 K/UL
EOSINOPHIL NFR BLD AUTO: 0.8 %
GLUCOSE SERPL-MCNC: 91 MG/DL
HCT VFR BLD CALC: 37.7 %
HGB BLD-MCNC: 11.9 G/DL
IMM GRANULOCYTES NFR BLD AUTO: 0.3 %
LYMPHOCYTES # BLD AUTO: 2.34 K/UL
LYMPHOCYTES NFR BLD AUTO: 38.9 %
MAN DIFF?: NORMAL
MCHC RBC-ENTMCNC: 22.1 PG
MCHC RBC-ENTMCNC: 31.6 GM/DL
MCV RBC AUTO: 70.1 FL
MONOCYTES # BLD AUTO: 0.69 K/UL
MONOCYTES NFR BLD AUTO: 11.5 %
NEUTROPHILS # BLD AUTO: 2.85 K/UL
NEUTROPHILS NFR BLD AUTO: 47.5 %
PLATELET # BLD AUTO: 391 K/UL
POTASSIUM SERPL-SCNC: 3.8 MMOL/L
PROT SERPL-MCNC: 7.1 G/DL
RAPID RVP RESULT: DETECTED
RBC # BLD: 5.38 M/UL
RBC # FLD: 17.3 %
RV+EV RNA SPEC QL NAA+PROBE: DETECTED
SODIUM SERPL-SCNC: 131 MMOL/L
WBC # FLD AUTO: 6.01 K/UL

## 2019-04-03 NOTE — HISTORY OF PRESENT ILLNESS
[FreeTextEntry1] : Patient reports an acute illness last week with cough congestion and fever. She had an episode of fecal incontinence. Acute symptoms largely resolved however has persistent cough.

## 2019-04-03 NOTE — PROCEDURE
[FreeTextEntry1] : Chest x-ray demonstrates no acute findings\par Spirometry normal with mild interval decline compared to prior

## 2019-04-03 NOTE — REVIEW OF SYSTEMS
[Fever] : fever [Chills] : chills [Fatigue] : fatigue [Cough] : cough [Dyspnea] : no dyspnea [Negative] : Sleep Disorder

## 2019-04-11 ENCOUNTER — RX RENEWAL (OUTPATIENT)
Age: 74
End: 2019-04-11

## 2019-04-11 RX ORDER — BETAMETHASONE DIPROPIONATE 0.5 MG/G
0.05 OINTMENT TOPICAL
Qty: 1 | Refills: 0 | Status: ACTIVE | COMMUNITY
Start: 2019-02-26 | End: 1900-01-01

## 2019-04-16 ENCOUNTER — APPOINTMENT (OUTPATIENT)
Dept: PULMONOLOGY | Facility: CLINIC | Age: 74
End: 2019-04-16
Payer: MEDICARE

## 2019-04-16 VITALS
RESPIRATION RATE: 16 BRPM | DIASTOLIC BLOOD PRESSURE: 74 MMHG | OXYGEN SATURATION: 97 % | TEMPERATURE: 98 F | SYSTOLIC BLOOD PRESSURE: 120 MMHG | HEART RATE: 59 BPM

## 2019-04-16 LAB — INR PPP: 1.8 RATIO

## 2019-04-16 PROCEDURE — 99212 OFFICE O/P EST SF 10 MIN: CPT

## 2019-04-16 PROCEDURE — 85610 PROTHROMBIN TIME: CPT | Mod: QW

## 2019-05-14 ENCOUNTER — APPOINTMENT (OUTPATIENT)
Dept: PULMONOLOGY | Facility: CLINIC | Age: 74
End: 2019-05-14
Payer: MEDICARE

## 2019-05-14 VITALS
TEMPERATURE: 98 F | DIASTOLIC BLOOD PRESSURE: 85 MMHG | SYSTOLIC BLOOD PRESSURE: 127 MMHG | RESPIRATION RATE: 16 BRPM | HEART RATE: 58 BPM | OXYGEN SATURATION: 100 %

## 2019-05-14 LAB — INR PPP: 2 RATIO

## 2019-05-14 PROCEDURE — 85610 PROTHROMBIN TIME: CPT | Mod: QW

## 2019-05-14 PROCEDURE — 94060 EVALUATION OF WHEEZING: CPT

## 2019-05-14 PROCEDURE — 99214 OFFICE O/P EST MOD 30 MIN: CPT | Mod: 25

## 2019-05-14 NOTE — REVIEW OF SYSTEMS
[Fever] : fever [Chills] : chills [Fatigue] : fatigue [Cough] : cough [Negative] : Sleep Disorder [Dyspnea] : no dyspnea

## 2019-05-14 NOTE — PHYSICAL EXAM
[General Appearance - Well Developed] : well developed [Normal Appearance] : normal appearance [Well Groomed] : well groomed [General Appearance - Well Nourished] : well nourished [No Deformities] : no deformities [Normal Conjunctiva] : the conjunctiva exhibited no abnormalities [General Appearance - In No Acute Distress] : no acute distress [Eyelids - No Xanthelasma] : the eyelids demonstrated no xanthelasmas [Neck Appearance] : the appearance of the neck was normal [Normal Oropharynx] : normal oropharynx [Neck Cervical Mass (___cm)] : no neck mass was observed [Jugular Venous Distention Increased] : there was no jugular-venous distention [Thyroid Diffuse Enlargement] : the thyroid was not enlarged [Heart Rate And Rhythm] : heart rate and rhythm were normal [Thyroid Nodule] : there were no palpable thyroid nodules [Heart Sounds] : normal S1 and S2 [Murmurs] : no murmurs present [Respiration, Rhythm And Depth] : normal respiratory rhythm and effort [Exaggerated Use Of Accessory Muscles For Inspiration] : no accessory muscle use [Auscultation Breath Sounds / Voice Sounds] : lungs were clear to auscultation bilaterally [Abdomen Soft] : soft [Tenderness: ____] : tenderness [unfilled] [Abdomen Tenderness] : non-tender [Abdomen Mass (___ Cm)] : no abdominal mass palpated [Nail Clubbing] : no clubbing of the fingernails [Cyanosis, Localized] : no localized cyanosis [Petechial Hemorrhages (___cm)] : no petechial hemorrhages [] : no ischemic changes [3+ Pitting] : 3+  pitting  [Deep Tendon Reflexes (DTR)] : deep tendon reflexes were 2+ and symmetric [No Focal Deficits] : no focal deficits [Sensation] : the sensory exam was normal to light touch and pinprick [Oriented To Time, Place, And Person] : oriented to person, place, and time [Impaired Insight] : insight and judgment were intact [Affect] : the affect was normal [FreeTextEntry1] : R knee incision healing well

## 2019-05-20 ENCOUNTER — RESULT CHARGE (OUTPATIENT)
Age: 74
End: 2019-05-20

## 2019-05-21 ENCOUNTER — LABORATORY RESULT (OUTPATIENT)
Age: 74
End: 2019-05-21

## 2019-05-21 ENCOUNTER — APPOINTMENT (OUTPATIENT)
Dept: PULMONOLOGY | Facility: CLINIC | Age: 74
End: 2019-05-21
Payer: MEDICARE

## 2019-05-21 VITALS
BODY MASS INDEX: 38.64 KG/M2 | RESPIRATION RATE: 16 BRPM | HEIGHT: 62 IN | TEMPERATURE: 98.4 F | WEIGHT: 210 LBS | SYSTOLIC BLOOD PRESSURE: 118 MMHG | DIASTOLIC BLOOD PRESSURE: 76 MMHG | OXYGEN SATURATION: 97 % | HEART RATE: 64 BPM

## 2019-05-21 DIAGNOSIS — R26.81 UNSTEADINESS ON FEET: ICD-10-CM

## 2019-05-21 PROCEDURE — 99214 OFFICE O/P EST MOD 30 MIN: CPT | Mod: 25

## 2019-05-21 PROCEDURE — 94060 EVALUATION OF WHEEZING: CPT

## 2019-05-21 PROCEDURE — 36415 COLL VENOUS BLD VENIPUNCTURE: CPT

## 2019-05-21 PROCEDURE — 95012 NITRIC OXIDE EXP GAS DETER: CPT

## 2019-05-22 LAB
BASOPHILS # BLD AUTO: 0.05 K/UL
BASOPHILS NFR BLD AUTO: 1 %
EOSINOPHIL # BLD AUTO: 0.11 K/UL
EOSINOPHIL NFR BLD AUTO: 2.1 %
HCT VFR BLD CALC: 33.2 %
HGB BLD-MCNC: 10.5 G/DL
IMM GRANULOCYTES NFR BLD AUTO: 0.4 %
LYMPHOCYTES # BLD AUTO: 1.91 K/UL
LYMPHOCYTES NFR BLD AUTO: 36.7 %
MAN DIFF?: NORMAL
MCHC RBC-ENTMCNC: 22.2 PG
MCHC RBC-ENTMCNC: 31.6 GM/DL
MCV RBC AUTO: 70 FL
MONOCYTES # BLD AUTO: 0.62 K/UL
MONOCYTES NFR BLD AUTO: 11.9 %
NEUTROPHILS # BLD AUTO: 2.49 K/UL
NEUTROPHILS NFR BLD AUTO: 47.9 %
PLATELET # BLD AUTO: 309 K/UL
RBC # BLD: 4.74 M/UL
RBC # FLD: 16.9 %
WBC # FLD AUTO: 5.2 K/UL

## 2019-05-28 LAB
A ALTERNATA IGE QN: 1.57 KUA/L
A FUMIGATUS IGE QN: 1.49 KUA/L
BERMUDA GRASS IGE QN: <0.1 KUA/L
BOXELDER IGE QN: <0.1 KUA/L
C HERBARUM IGE QN: 0.13 KUA/L
CALIF WALNUT IGE QN: <0.1 KUA/L
CAT DANDER IGE QN: <0.1 KUA/L
CMN PIGWEED IGE QN: <0.1 KUA/L
COMMON RAGWEED IGE QN: <0.1 KUA/L
COTTONWOOD IGE QN: <0.1 KUA/L
D FARINAE IGE QN: 0.56 KUA/L
D PTERONYSS IGE QN: 0.66 KUA/L
DEPRECATED A ALTERNATA IGE RAST QL: 2
DEPRECATED A FUMIGATUS IGE RAST QL: 2
DEPRECATED BERMUDA GRASS IGE RAST QL: 0
DEPRECATED BOXELDER IGE RAST QL: 0
DEPRECATED C HERBARUM IGE RAST QL: NORMAL
DEPRECATED CAT DANDER IGE RAST QL: 0
DEPRECATED COMMON PIGWEED IGE RAST QL: 0
DEPRECATED COMMON RAGWEED IGE RAST QL: 0
DEPRECATED COTTONWOOD IGE RAST QL: 0
DEPRECATED D FARINAE IGE RAST QL: 1
DEPRECATED D PTERONYSS IGE RAST QL: 1
DEPRECATED DOG DANDER IGE RAST QL: 0
DEPRECATED GOOSEFOOT IGE RAST QL: 0
DEPRECATED LONDON PLANE IGE RAST QL: 0
DEPRECATED MUGWORT IGE RAST QL: 0
DEPRECATED P NOTATUM IGE RAST QL: NORMAL
DEPRECATED RED CEDAR IGE RAST QL: 0
DEPRECATED ROACH IGE RAST QL: 0
DEPRECATED SHEEP SORREL IGE RAST QL: 0
DEPRECATED SILVER BIRCH IGE RAST QL: 0
DEPRECATED TIMOTHY IGE RAST QL: NORMAL
DEPRECATED WHITE ASH IGE RAST QL: 0
DEPRECATED WHITE OAK IGE RAST QL: 0
DOG DANDER IGE QN: <0.1 KUA/L
GOOSEFOOT IGE QN: <0.1 KUA/L
LONDON PLANE IGE QN: <0.1 KUA/L
MUGWORT IGE QN: <0.1 KUA/L
MULBERRY (T70) CLASS: 0
MULBERRY (T70) CONC: <0.1 KUA/L
P NOTATUM IGE QN: 0.31 KUA/L
RED CEDAR IGE QN: <0.1 KUA/L
ROACH IGE QN: <0.1 KUA/L
SHEEP SORREL IGE QN: <0.1 KUA/L
SILVER BIRCH IGE QN: <0.1 KUA/L
TIMOTHY IGE QN: 0.11 KUA/L
TREE ALLERG MIX1 IGE QL: 0
WHITE ASH IGE QN: <0.1 KUA/L
WHITE ELM IGE QN: 0
WHITE ELM IGE QN: <0.1 KUA/L
WHITE OAK IGE QN: <0.1 KUA/L

## 2019-05-28 NOTE — ASSESSMENT
[FreeTextEntry1] : Ongoing symptoms of cough and congestion. Asthma appears better. Suspect symptoms related to postnasal drip.

## 2019-05-28 NOTE — HISTORY OF PRESENT ILLNESS
[FreeTextEntry1] : Complaining of increasing cough congestion. Similar symptoms earlier this month treated for asthma.

## 2019-05-28 NOTE — PHYSICAL EXAM
[General Appearance - Well Developed] : well developed [Normal Appearance] : normal appearance [Well Groomed] : well groomed [General Appearance - Well Nourished] : well nourished [No Deformities] : no deformities [General Appearance - In No Acute Distress] : no acute distress [Normal Conjunctiva] : the conjunctiva exhibited no abnormalities [Eyelids - No Xanthelasma] : the eyelids demonstrated no xanthelasmas [Neck Appearance] : the appearance of the neck was normal [Neck Cervical Mass (___cm)] : no neck mass was observed [Jugular Venous Distention Increased] : there was no jugular-venous distention [Thyroid Diffuse Enlargement] : the thyroid was not enlarged [Thyroid Nodule] : there were no palpable thyroid nodules [Heart Rate And Rhythm] : heart rate and rhythm were normal [Heart Sounds] : normal S1 and S2 [Murmurs] : no murmurs present [Respiration, Rhythm And Depth] : normal respiratory rhythm and effort [Exaggerated Use Of Accessory Muscles For Inspiration] : no accessory muscle use [Auscultation Breath Sounds / Voice Sounds] : lungs were clear to auscultation bilaterally [Tenderness: ____] : tenderness [unfilled] [Abdomen Soft] : soft [Abdomen Tenderness] : non-tender [Abdomen Mass (___ Cm)] : no abdominal mass palpated [Nail Clubbing] : no clubbing of the fingernails [Cyanosis, Localized] : no localized cyanosis [Petechial Hemorrhages (___cm)] : no petechial hemorrhages [] : no ischemic changes [3+ Pitting] : 3+  pitting  [Deep Tendon Reflexes (DTR)] : deep tendon reflexes were 2+ and symmetric [Sensation] : the sensory exam was normal to light touch and pinprick [No Focal Deficits] : no focal deficits [Oriented To Time, Place, And Person] : oriented to person, place, and time [Impaired Insight] : insight and judgment were intact [Affect] : the affect was normal [Normal Oropharynx] : abnormal oropharynx [FreeTextEntry1] : exczema vs dry skim R hand

## 2019-05-28 NOTE — PROCEDURE
[FreeTextEntry1] : Spirometry demonstrates obstructive pattern with borderline positive bronchodilator response and model interval decline compared to prior\par \par NIOX 17

## 2019-05-30 ENCOUNTER — RX RENEWAL (OUTPATIENT)
Age: 74
End: 2019-05-30

## 2019-05-31 ENCOUNTER — RX RENEWAL (OUTPATIENT)
Age: 74
End: 2019-05-31

## 2019-06-04 ENCOUNTER — RX RENEWAL (OUTPATIENT)
Age: 74
End: 2019-06-04

## 2019-06-10 ENCOUNTER — FORM ENCOUNTER (OUTPATIENT)
Age: 74
End: 2019-06-10

## 2019-06-11 ENCOUNTER — OUTPATIENT (OUTPATIENT)
Dept: OUTPATIENT SERVICES | Facility: HOSPITAL | Age: 74
LOS: 1 days | End: 2019-06-11
Payer: COMMERCIAL

## 2019-06-11 ENCOUNTER — APPOINTMENT (OUTPATIENT)
Dept: CT IMAGING | Facility: IMAGING CENTER | Age: 74
End: 2019-06-11
Payer: MEDICARE

## 2019-06-11 DIAGNOSIS — I83.93 ASYMPTOMATIC VARICOSE VEINS OF BILATERAL LOWER EXTREMITIES: Chronic | ICD-10-CM

## 2019-06-11 DIAGNOSIS — Z90.49 ACQUIRED ABSENCE OF OTHER SPECIFIED PARTS OF DIGESTIVE TRACT: Chronic | ICD-10-CM

## 2019-06-11 DIAGNOSIS — Z96.651 PRESENCE OF RIGHT ARTIFICIAL KNEE JOINT: Chronic | ICD-10-CM

## 2019-06-11 DIAGNOSIS — R91.8 OTHER NONSPECIFIC ABNORMAL FINDING OF LUNG FIELD: ICD-10-CM

## 2019-06-11 DIAGNOSIS — Z98.1 ARTHRODESIS STATUS: Chronic | ICD-10-CM

## 2019-06-11 DIAGNOSIS — Z98.890 OTHER SPECIFIED POSTPROCEDURAL STATES: Chronic | ICD-10-CM

## 2019-06-11 DIAGNOSIS — Z98.89 OTHER SPECIFIED POSTPROCEDURAL STATES: Chronic | ICD-10-CM

## 2019-06-11 PROCEDURE — 71250 CT THORAX DX C-: CPT

## 2019-06-11 PROCEDURE — 71250 CT THORAX DX C-: CPT | Mod: 26

## 2019-06-14 ENCOUNTER — APPOINTMENT (OUTPATIENT)
Dept: PULMONOLOGY | Facility: CLINIC | Age: 74
End: 2019-06-14
Payer: MEDICARE

## 2019-06-14 VITALS
HEIGHT: 62 IN | SYSTOLIC BLOOD PRESSURE: 133 MMHG | TEMPERATURE: 98 F | WEIGHT: 210 LBS | HEART RATE: 90 BPM | BODY MASS INDEX: 38.64 KG/M2 | RESPIRATION RATE: 15 BRPM | DIASTOLIC BLOOD PRESSURE: 77 MMHG | OXYGEN SATURATION: 97 %

## 2019-06-14 LAB — INR PPP: 1.9 RATIO

## 2019-06-14 PROCEDURE — 85610 PROTHROMBIN TIME: CPT | Mod: QW

## 2019-06-14 PROCEDURE — 94060 EVALUATION OF WHEEZING: CPT

## 2019-06-14 PROCEDURE — 99214 OFFICE O/P EST MOD 30 MIN: CPT | Mod: 25

## 2019-06-15 NOTE — PHYSICAL EXAM
[General Appearance - Well Developed] : well developed [Normal Appearance] : normal appearance [Well Groomed] : well groomed [General Appearance - Well Nourished] : well nourished [No Deformities] : no deformities [General Appearance - In No Acute Distress] : no acute distress [Normal Conjunctiva] : the conjunctiva exhibited no abnormalities [Eyelids - No Xanthelasma] : the eyelids demonstrated no xanthelasmas [Normal Oropharynx] : abnormal oropharynx [Neck Appearance] : the appearance of the neck was normal [Neck Cervical Mass (___cm)] : no neck mass was observed [Jugular Venous Distention Increased] : there was no jugular-venous distention [Thyroid Diffuse Enlargement] : the thyroid was not enlarged [Thyroid Nodule] : there were no palpable thyroid nodules [Heart Rate And Rhythm] : heart rate and rhythm were normal [Heart Sounds] : normal S1 and S2 [Murmurs] : no murmurs present [Respiration, Rhythm And Depth] : normal respiratory rhythm and effort [Exaggerated Use Of Accessory Muscles For Inspiration] : no accessory muscle use [Auscultation Breath Sounds / Voice Sounds] : lungs were clear to auscultation bilaterally [Tenderness: ____] : tenderness [unfilled] [Abdomen Soft] : soft [Abdomen Tenderness] : non-tender [Abdomen Mass (___ Cm)] : no abdominal mass palpated [Nail Clubbing] : no clubbing of the fingernails [Cyanosis, Localized] : no localized cyanosis [Petechial Hemorrhages (___cm)] : no petechial hemorrhages [] : no ischemic changes [3+ Pitting] : 3+  pitting  [FreeTextEntry1] : exczema vs dry skim R hand [Deep Tendon Reflexes (DTR)] : deep tendon reflexes were 2+ and symmetric [Sensation] : the sensory exam was normal to light touch and pinprick [No Focal Deficits] : no focal deficits [Oriented To Time, Place, And Person] : oriented to person, place, and time [Impaired Insight] : insight and judgment were intact [Affect] : the affect was normal

## 2019-06-15 NOTE — ASSESSMENT
[FreeTextEntry1] : \par \par \par Having worsening shortness of breath. Will refer for cardiology evaluation.\par Pulmonary nodules noted on CT scan likely post infectious in origin. We'll continue to follow.\par Anticoagulation slightly low Will increase Coumadin to 7.5 mg 3 times weekly and 5 mg 4 times weekly\par

## 2019-06-15 NOTE — HISTORY OF PRESENT ILLNESS
[FreeTextEntry1] : Here for INR check\par CT f/u\par Notes worsening shortness of breath with reduced exercise to\par

## 2019-06-17 ENCOUNTER — RX RENEWAL (OUTPATIENT)
Age: 74
End: 2019-06-17

## 2019-06-18 ENCOUNTER — RX RENEWAL (OUTPATIENT)
Age: 74
End: 2019-06-18

## 2019-07-16 ENCOUNTER — APPOINTMENT (OUTPATIENT)
Dept: PULMONOLOGY | Facility: CLINIC | Age: 74
End: 2019-07-16
Payer: MEDICARE

## 2019-07-16 VITALS
OXYGEN SATURATION: 100 % | HEART RATE: 63 BPM | SYSTOLIC BLOOD PRESSURE: 145 MMHG | DIASTOLIC BLOOD PRESSURE: 81 MMHG | RESPIRATION RATE: 16 BRPM

## 2019-07-16 LAB — INR PPP: 2.1 RATIO

## 2019-07-16 PROCEDURE — 99212 OFFICE O/P EST SF 10 MIN: CPT | Mod: 25

## 2019-07-16 PROCEDURE — 85610 PROTHROMBIN TIME: CPT | Mod: QW

## 2019-07-18 ENCOUNTER — APPOINTMENT (OUTPATIENT)
Dept: OBGYN | Facility: CLINIC | Age: 74
End: 2019-07-18
Payer: MEDICARE

## 2019-07-18 VITALS
SYSTOLIC BLOOD PRESSURE: 145 MMHG | WEIGHT: 214.5 LBS | HEIGHT: 62 IN | BODY MASS INDEX: 39.47 KG/M2 | HEART RATE: 60 BPM | DIASTOLIC BLOOD PRESSURE: 84 MMHG

## 2019-07-18 DIAGNOSIS — Z01.419 ENCOUNTER FOR GYNECOLOGICAL EXAMINATION (GENERAL) (ROUTINE) W/OUT ABNORMAL FINDINGS: ICD-10-CM

## 2019-07-18 PROCEDURE — 99397 PER PM REEVAL EST PAT 65+ YR: CPT

## 2019-07-18 NOTE — PHYSICAL EXAM
[Awake] : awake [Alert] : alert [Soft] : soft [Oriented x3] : oriented to person, place, and time [Normal] : cervix [No Bleeding] : there was no active vaginal bleeding [Absent] : absent [Uterine Adnexae] : were not tender and not enlarged [Acute Distress] : no acute distress [Mass] : no breast mass [Nipple Discharge] : no nipple discharge [Axillary LAD] : no axillary lymphadenopathy [Tender] : non tender

## 2019-07-19 NOTE — HISTORY OF PRESENT ILLNESS
[FreeTextEntry1] : Followup for long-term management of anticoagulation INR check. No new complaints

## 2019-07-23 ENCOUNTER — RX RENEWAL (OUTPATIENT)
Age: 74
End: 2019-07-23

## 2019-07-25 ENCOUNTER — RX RENEWAL (OUTPATIENT)
Age: 74
End: 2019-07-25

## 2019-08-05 ENCOUNTER — RX RENEWAL (OUTPATIENT)
Age: 74
End: 2019-08-05

## 2019-08-07 ENCOUNTER — RX RENEWAL (OUTPATIENT)
Age: 74
End: 2019-08-07

## 2019-08-19 ENCOUNTER — RX RENEWAL (OUTPATIENT)
Age: 74
End: 2019-08-19

## 2019-08-20 ENCOUNTER — APPOINTMENT (OUTPATIENT)
Dept: PULMONOLOGY | Facility: CLINIC | Age: 74
End: 2019-08-20

## 2019-08-22 ENCOUNTER — APPOINTMENT (OUTPATIENT)
Dept: PULMONOLOGY | Facility: CLINIC | Age: 74
End: 2019-08-22
Payer: MEDICARE

## 2019-08-22 VITALS — DIASTOLIC BLOOD PRESSURE: 69 MMHG | OXYGEN SATURATION: 99 % | SYSTOLIC BLOOD PRESSURE: 119 MMHG | HEART RATE: 67 BPM

## 2019-08-22 LAB — INR PPP: 2 RATIO

## 2019-08-22 PROCEDURE — 85610 PROTHROMBIN TIME: CPT | Mod: QW

## 2019-08-22 PROCEDURE — 99212 OFFICE O/P EST SF 10 MIN: CPT | Mod: 25

## 2019-09-03 ENCOUNTER — CHART COPY (OUTPATIENT)
Age: 74
End: 2019-09-03

## 2019-09-03 ENCOUNTER — RX RENEWAL (OUTPATIENT)
Age: 74
End: 2019-09-03

## 2019-09-13 ENCOUNTER — RX RENEWAL (OUTPATIENT)
Age: 74
End: 2019-09-13

## 2019-09-20 ENCOUNTER — APPOINTMENT (OUTPATIENT)
Dept: PULMONOLOGY | Facility: CLINIC | Age: 74
End: 2019-09-20
Payer: MEDICARE

## 2019-09-20 VITALS
HEART RATE: 59 BPM | RESPIRATION RATE: 15 BRPM | OXYGEN SATURATION: 100 % | SYSTOLIC BLOOD PRESSURE: 146 MMHG | WEIGHT: 213 LBS | HEIGHT: 62 IN | BODY MASS INDEX: 39.2 KG/M2 | DIASTOLIC BLOOD PRESSURE: 81 MMHG

## 2019-09-20 DIAGNOSIS — Z23 ENCOUNTER FOR IMMUNIZATION: ICD-10-CM

## 2019-09-20 LAB — INR PPP: 1.8 RATIO

## 2019-09-20 PROCEDURE — 90662 IIV NO PRSV INCREASED AG IM: CPT

## 2019-09-20 PROCEDURE — 85610 PROTHROMBIN TIME: CPT | Mod: QW

## 2019-09-20 PROCEDURE — 99214 OFFICE O/P EST MOD 30 MIN: CPT | Mod: 25

## 2019-09-20 PROCEDURE — G0008: CPT

## 2019-09-22 NOTE — ASSESSMENT
[FreeTextEntry1] : no change in antivoagulation\par Can increase Lyrica to 150 mg three times per day\par F/u with rheumatology\par will need f/u CT chest

## 2019-09-22 NOTE — HISTORY OF PRESENT ILLNESS
[FreeTextEntry1] : Followup for long-term management of anticoagulation INR check. No new complaints\par asking about ct f/u for nodules\par having more pain, asking about lyrica

## 2019-09-22 NOTE — PHYSICAL EXAM
[General Appearance - Well Developed] : well developed [Normal Appearance] : normal appearance [Well Groomed] : well groomed [General Appearance - Well Nourished] : well nourished [No Deformities] : no deformities [General Appearance - In No Acute Distress] : no acute distress [Normal Conjunctiva] : the conjunctiva exhibited no abnormalities [Eyelids - No Xanthelasma] : the eyelids demonstrated no xanthelasmas [Normal Oropharynx] : abnormal oropharynx [Neck Appearance] : the appearance of the neck was normal [Neck Cervical Mass (___cm)] : no neck mass was observed [Thyroid Diffuse Enlargement] : the thyroid was not enlarged [Jugular Venous Distention Increased] : there was no jugular-venous distention [Heart Rate And Rhythm] : heart rate and rhythm were normal [Thyroid Nodule] : there were no palpable thyroid nodules [Heart Sounds] : normal S1 and S2 [Murmurs] : no murmurs present [Respiration, Rhythm And Depth] : normal respiratory rhythm and effort [Exaggerated Use Of Accessory Muscles For Inspiration] : no accessory muscle use [Tenderness: ____] : tenderness [unfilled] [Auscultation Breath Sounds / Voice Sounds] : lungs were clear to auscultation bilaterally [Abdomen Soft] : soft [Abdomen Tenderness] : non-tender [Abdomen Mass (___ Cm)] : no abdominal mass palpated [Cyanosis, Localized] : no localized cyanosis [Nail Clubbing] : no clubbing of the fingernails [Petechial Hemorrhages (___cm)] : no petechial hemorrhages [] : no ischemic changes [3+ Pitting] : 3+  pitting  [FreeTextEntry1] : exczema vs dry skim R hand [Sensation] : the sensory exam was normal to light touch and pinprick [Deep Tendon Reflexes (DTR)] : deep tendon reflexes were 2+ and symmetric [Oriented To Time, Place, And Person] : oriented to person, place, and time [No Focal Deficits] : no focal deficits [Impaired Insight] : insight and judgment were intact [Affect] : the affect was normal

## 2019-09-26 ENCOUNTER — APPOINTMENT (OUTPATIENT)
Dept: DERMATOLOGY | Facility: CLINIC | Age: 74
End: 2019-09-26
Payer: MEDICARE

## 2019-09-26 VITALS
HEART RATE: 59 BPM | WEIGHT: 213 LBS | HEIGHT: 62 IN | BODY MASS INDEX: 39.2 KG/M2 | DIASTOLIC BLOOD PRESSURE: 83 MMHG | SYSTOLIC BLOOD PRESSURE: 136 MMHG | OXYGEN SATURATION: 99 %

## 2019-09-26 PROCEDURE — 99213 OFFICE O/P EST LOW 20 MIN: CPT

## 2019-09-26 RX ORDER — LOSARTAN POTASSIUM AND HYDROCHLOROTHIAZIDE 25; 100 MG/1; MG/1
100-25 TABLET ORAL DAILY
Refills: 0 | Status: DISCONTINUED | COMMUNITY
End: 2019-09-26

## 2019-09-26 RX ORDER — DICLOFENAC SODIUM 10 MG/G
1 GEL TOPICAL DAILY
Qty: 1 | Refills: 1 | Status: DISCONTINUED | COMMUNITY
Start: 2019-01-28 | End: 2019-09-26

## 2019-09-26 RX ORDER — METHYLPREDNISOLONE 4 MG/1
4 TABLET ORAL
Qty: 1 | Refills: 0 | Status: DISCONTINUED | COMMUNITY
Start: 2019-04-02 | End: 2019-09-26

## 2019-10-02 ENCOUNTER — FORM ENCOUNTER (OUTPATIENT)
Age: 74
End: 2019-10-02

## 2019-10-03 ENCOUNTER — OUTPATIENT (OUTPATIENT)
Dept: OUTPATIENT SERVICES | Facility: HOSPITAL | Age: 74
LOS: 1 days | End: 2019-10-03
Payer: COMMERCIAL

## 2019-10-03 ENCOUNTER — APPOINTMENT (OUTPATIENT)
Dept: CT IMAGING | Facility: IMAGING CENTER | Age: 74
End: 2019-10-03
Payer: MEDICARE

## 2019-10-03 DIAGNOSIS — Z98.890 OTHER SPECIFIED POSTPROCEDURAL STATES: Chronic | ICD-10-CM

## 2019-10-03 DIAGNOSIS — Z98.89 OTHER SPECIFIED POSTPROCEDURAL STATES: Chronic | ICD-10-CM

## 2019-10-03 DIAGNOSIS — I83.93 ASYMPTOMATIC VARICOSE VEINS OF BILATERAL LOWER EXTREMITIES: Chronic | ICD-10-CM

## 2019-10-03 DIAGNOSIS — Z96.651 PRESENCE OF RIGHT ARTIFICIAL KNEE JOINT: Chronic | ICD-10-CM

## 2019-10-03 DIAGNOSIS — R91.8 OTHER NONSPECIFIC ABNORMAL FINDING OF LUNG FIELD: ICD-10-CM

## 2019-10-03 DIAGNOSIS — Z98.1 ARTHRODESIS STATUS: Chronic | ICD-10-CM

## 2019-10-03 DIAGNOSIS — Z90.49 ACQUIRED ABSENCE OF OTHER SPECIFIED PARTS OF DIGESTIVE TRACT: Chronic | ICD-10-CM

## 2019-10-03 PROCEDURE — 71250 CT THORAX DX C-: CPT

## 2019-10-03 PROCEDURE — 71250 CT THORAX DX C-: CPT | Mod: 26

## 2019-10-15 NOTE — PRE-OP CHECKLIST - SKIN PREP
For possible fever or pain-relief after vaccines:  Children's Tylenol -- 5 ml every 4 hours as needed    Can resume Children's Cetirizine ONCE DAILY, as needed, for dry cough, runny nose, sneezing    START Prednisolone Syrup ONCE DAILY, EVERYDAY, for 1 WEEK (safe to give WITH Cetirizine)    RECHECK in 1 WEEK if cough is persisting    Next well-check at 2 [facility]             Child's Well Visit, 18 Months: Care Instructions  Your Care Instructions    You may be wondering where your cooperative baby went. Children at this age are quick to say \"No!\" and slow to do what is asked. Your child is learning how to make decisions and how far he or she can push limits. This same bossy child may be quick to climb up in your lap with a favorite stuffed animal. Give your child kindness and love. It will pay off soon. At 18 months, your child may be ready to throw balls and walk quickly or run. He or she may say several words, listen to stories, and look at pictures. Your child may know how to use a spoon and cup. Follow-up care is a key part of your child's treatment and safety. Be sure to make and go to all appointments, and call your doctor if your child is having problems. It's also a good idea to know your child's test results and keep a list of the medicines your child takes. How can you care for your child at home? Safety  · Help prevent your child from choking by offering the right kinds of foods and watching out for choking hazards. · Watch your child at all times near the street or in a parking lot. Drivers may not be able to see small children. Know where your child is and check carefully before backing your car out of the driveway. · Watch your child at all times when he or she is near water, including pools, hot tubs, buckets, bathtubs, and toilets. · For every ride in a car, secure your child into a properly installed car seat that meets all current safety standards.  For questions about car seats, call the [employer] at 0-222.435.4944. · Make sure your child cannot get burned. Keep hot pots, curling irons, irons, and coffee cups out of his or her reach. Put plastic plugs in all electrical sockets. Put in smoke detectors and check the batteries regularly. · Put locks or guards on all windows above the first floor. Watch your child at all times near play equipment and stairs. If your child is climbing out of his or her crib, change to a toddler bed. · Keep cleaning products and medicines in locked cabinets out of your child's reach. Keep the number for Poison Control (7-261.450.4638) in or near your phone. · Tell your doctor if your child spends a lot of time in a house built before 1978. The paint could have lead in it, which can be harmful. · Help your child brush his or her teeth every day. For children this age, use a tiny amount of toothpaste with fluoride (the size of a grain of rice). Discipline  · Teach your child good behavior. Catch your child being good and respond to that behavior. · Use your body language, such as looking sad, to let your child know you do not like his or her behavior. A child this age [de-identified] misbehave 27 times a day. · Do not spank your child. · If you are having problems with discipline, talk to your doctor to find out what you can do to help your child. Feeding  · Offer a variety of healthy foods each day, including fruits, well-cooked vegetables, low-sugar cereal, yogurt, whole-grain breads and crackers, lean meat, fish, and tofu. Kids need to eat at least every 3 or 4 hours. · Do not give your child foods that may cause choking, such as nuts, whole grapes, hard or sticky candy, or popcorn. · Give your child healthy snacks. Even if your child does not seem to like them at first, keep trying.  Buy snack foods made from wheat, corn, rice, oats, or other grains, such as breads, cereals, tortillas, noodles, crackers, and roney. Immunizations  · Make sure your baby gets all the recommended childhood vaccines. They will help keep your baby healthy and prevent the spread of disease. When should you call for help? Watch closely for changes in your child's health, and be sure to contact your doctor if:    · You are concerned that your child is not growing or developing normally.     · You are worried about your child's behavior.     · You need more information about how to care for your child, or you have questions or concerns. Where can you learn more? Go to http://kaley-bharti.info/. Enter X902 in the search box to learn more about \"Child's Well Visit, 18 Months: Care Instructions. \"  Current as of: December 12, 2018  Content Version: 12.2  © 3809-8877 [email or web address]. Care instructions adapted under license by [employer] (which disclaims liability or warranty for this information). If you have questions about a medical condition or this instruction, always ask your healthcare professional. [place] [address] any warranty or liability for your use of this information. Influenza (Flu) Vaccine (Inactivated or Recombinant): What You Need to Know  Why get vaccinated? Influenza (\"flu\") is a contagious disease that spreads around the [place] [facility] every winter, usually between October and May. Flu is caused by influenza viruses and is spread mainly by coughing, sneezing, and close contact. Anyone can get flu. Flu strikes suddenly and can last several days. Symptoms vary by age, but can include:  · Fever/chills. · Sore throat. · Muscle aches. · Fatigue. · Cough. · Headache. · Runny or stuffy nose. Flu can also lead to pneumonia and blood infections, and cause diarrhea and seizures in children. If you have a medical condition, such as heart or lung disease, flu can make it worse. Flu is more dangerous for some people.  Infants and young children, people 72 years of age and older, pregnant women, and people with certain health conditions or a weakened immune system are at greatest risk. Each year thousands of people in the [facility] die from flu, and many more are hospitalized. Flu vaccine can:  · Keep you from getting flu. · Make flu less severe if you do get it. · Keep you from spreading flu to your family and other people. Inactivated and recombinant flu vaccines  A dose of flu vaccine is recommended every flu season. Children 6 months through 6years of age may need two doses during the same flu season. Everyone else needs only one dose each flu season. Some inactivated flu vaccines contain a very small amount of a mercury-based preservative called thimerosal. Studies have not shown thimerosal in vaccines to be harmful, but flu vaccines that do not contain thimerosal are available. There is no live flu virus in flu shots. They cannot cause the flu. There are many flu viruses, and they are always changing. Each year a new flu vaccine is made to protect against three or four viruses that are likely to cause disease in the upcoming flu season. But even when the vaccine doesn't exactly match these viruses, it may still provide some protection. Flu vaccine cannot prevent:  · Flu that is caused by a virus not covered by the vaccine. · Illnesses that look like flu but are not. Some people should not get this vaccine  Tell the person who is giving you the vaccine:  · If you have any severe (life-threatening) allergies. If you ever had a life-threatening allergic reaction after a dose of flu vaccine, or have a severe allergy to any part of this vaccine, you may be advised not to get vaccinated. Most, but not all, types of flu vaccine contain a small amount of egg protein. · If you ever had Guillain-Barré syndrome (also called GBS) Some people with a history of GBS should not get this vaccine. This should be discussed with your doctor. · If you are not feeling well. It is usually okay to get flu vaccine when you have a mild illness, but you might be asked to come back when you feel better. Risks of a vaccine reaction  With any medicine, including vaccines, there is a chance of reactions. These are usually mild and go away on their own, but serious reactions are also possible. Most people who get a flu shot do not have any problems with it. Minor problems following a flu shot include:  · Soreness, redness, or swelling where the shot was given  · Hoarseness  · Sore, red or itchy eyes  · Cough  · Fever  · Aches  · Headache  · Itching  · Fatigue  If these problems occur, they usually begin soon after the shot and last 1 or 2 days. More serious problems following a flu shot can include the following:  · There may be a small increased risk of Guillain-Barré Syndrome (GBS) after inactivated flu vaccine. This risk has been estimated at 1 or 2 additional cases per million people vaccinated. This is much lower than the risk of severe complications from flu, which can be prevented by flu vaccine. · Aneesh Commander children who get the flu shot along with pneumococcal vaccine (PCV13) and/or DTaP vaccine at the same time might be slightly more likely to have a seizure caused by fever. Ask your doctor for more information. Tell your doctor if a child who is getting flu vaccine has ever had a seizure  Problems that could happen after any injected vaccine:  · People sometimes faint after a medical procedure, including vaccination. Sitting or lying down for about 15 minutes can help prevent fainting, and injuries caused by a fall. Tell your doctor if you feel dizzy, or have vision changes or ringing in the ears. · Some people get severe pain in the shoulder and have difficulty moving the arm where a shot was given. This happens very rarely. · Any medication can cause a severe allergic reaction.  Such reactions from a vaccine are very rare, estimated at about 1 in a million doses, and would happen within a few minutes to a few hours after the vaccination. As with any medicine, there is a very remote chance of a vaccine causing a serious injury or death. The safety of vaccines is always being monitored. For more information, visit: www.cdc.gov/vaccinesafety/. What if there is a serious reaction? What should I look for? · Look for anything that concerns you, such as signs of a severe allergic reaction, very high fever, or unusual behavior. Signs of a severe allergic reaction can include hives, swelling of the face and throat, difficulty breathing, a fast heartbeat, dizziness, and weakness - usually within a few minutes to a few hours after the vaccination. What should I do? · If you think it is a severe allergic reaction or other emergency that can't wait, call 9-1-1 and get the person to the nearest hospital. Otherwise, call your doctor. · Reactions should be reported to the \"Vaccine Adverse Event Reporting System\" (VAERS). Your doctor should file this report, or you can do it yourself through the VAERS website at www.vaers. [facility].gov, or by calling 9-472.693.4596. VAERS does not give medical advice. The National Vaccine Injury Compensation Program  The National Vaccine Injury Compensation Program (VICP) is a federal program that was created to compensate people who may have been injured by certain vaccines. Persons who believe they may have been injured by a vaccine can learn about the program and about filing a claim by calling 4-444.313.6552 or visiting the [employer] website at www.[facility].gov/vaccinecompensation. There is a time limit to file a claim for compensation. How can I learn more? · Ask your healthcare provider. He or she can give you the vaccine package insert or suggest other sources of information. · Call your local or state health department.   · Contact the Centers for Disease Control and Prevention (CDC):  ¨ Call 4-856.918.3861 (1-800-CDC-INFO) or  ¨ Visit CDC's website at www.cdc.gov/flu  Vaccine Information Statement  Inactivated Influenza Vaccine  8/7/2015)  42 YENNIFER Jackson Miguel [id number]  Department of Health and Human Services  Centers for Disease Control and Prevention  Many Vaccine Information Statements are available in Wolof and other languages. See www.immunize.org/vis. Muchas hojas de información sobre vacunas están disponibles en español y en otros idiomas. Visite www.immunize.org/vis. Care instructions adapted under license by [employer] (which disclaims liability or warranty for this information). If you have questions about a medical condition or this instruction, always ask your healthcare professional. [place] [address] any warranty or liability for your use of this information.       DTaP (Diphtheria, Tetanus, Pertussis) Vaccine: What You Need to Know  Why get vaccinated? Diphtheria, tetanus, and pertussis are serious diseases caused by bacteria. Diphtheria and pertussis are spread from person to person. Tetanus enters the body through cuts or wounds. DIPHTHERIA causes a thick covering in the back of the throat. · It can lead to breathing problems, paralysis, heart failure, and even death. TETANUS (Lockjaw) causes painful tightening of the muscles, usually all over the body. · It can lead to \"locking\" of the jaw so the victim cannot open his mouth or swallow. Tetanus leads to death in up to 2 out of 10 cases. PERTUSSIS (Whooping Cough) causes coughing spells so bad that it is hard for infants to eat, drink, or breathe. These spells can last for weeks. · It can lead to pneumonia, seizures (jerking and staring spells), brain damage, and death. Diphtheria, tetanus, and pertussis vaccine (DTaP) can help prevent these diseases. Most children who are vaccinated with DTaP will be protected throughout childhood. Many more children would get these diseases if we stopped vaccinating. DTaP is a safer version of an older vaccine called DTP.  DTP is no longer used in the [place]. Who should get DTaP vaccine and when? Children should get 5 doses of DTaP vaccine, one dose at each of the following ages:  · 2 months  · 4 months  · 6 months  · 15-18 months  · 4-6 years  DTaP may be given at the same time as other vaccines. Some children should not get DTaP vaccine or should wait. · Children with minor illnesses, such as a cold, may be vaccinated. But children who are moderately or severely ill should usually wait until they recover before getting DTaP vaccine. · Any child who had a life-threatening allergic reaction after a dose of DTaP should not get another dose. · Any child who suffered a brain or nervous system disease within 7 days after a dose of DTaP should not get another dose. · Talk with your doctor if your child:  Judith Ray Had a seizure or collapsed after a dose of DTaP. ¨ Cried non-stop for 3 hours or more after a dose of DTaP. ¨ Had a fever over 105°F after a dose of DTaP. Ask your doctor for more information. Some of these children should not get another dose of pertussis vaccine, but may get a vaccine without pertussis, called DT. Older children and adults  DTaP is not licensed for adolescents, adults, or children 9years of age and older. But older people still need protection. A vaccine called Tdap is similar to DTaP. A single dose of Tdap is recommended for people 11 through 59years of age. Another vaccine, called Td, protects against tetanus and diphtheria, but not pertussis. It is recommended every 10 years. There are separate Vaccine Information Statements for these vaccines. What are the risks from DTaP vaccine? Getting diphtheria, tetanus, or pertussis disease is much riskier than getting DTaP vaccine. However, a vaccine, like any medicine, is capable of causing serious problems, such as severe allergic reactions. The risk of DTaP vaccine causing serious harm, or death, is extremely small.   Mild Problems (Common)  · Fever (up to about 1 child in 4)  · Redness or swelling where the shot was given (up to about 1 child in 4)  · Soreness or tenderness where the shot was given (up to about 1 child in 4)  These problems occur more often after the 4th and 5th doses of the DTaP series than after earlier doses. Sometimes the 4th or 5th dose of DTaP vaccine is followed by swelling of the entire arm or leg in which the shot was given, lasting 1-7 days (up to about 1 child in 27). Other mild problems include:  · Fussiness (up to about 1 child in 3)  · Tiredness or poor appetite (up to about 1 child in 10)  · Vomiting (up to about 1 child in 48)  These problems generally occur 1-3 days after the shot. Moderate Problems (Uncommon)  · Seizure (jerking or staring) (about 1 child out of 14,000)  · Non-stop crying, for 3 hours or more (up to about 1 child out of 1,000)  · High fever, over 105°F (about 1 child out of 16,000)  Severe Problems (Very Rare)  · Serious allergic reaction (less than 1 out of a million doses)  · Several other severe problems have been reported after DTaP vaccine. These include:  ¨ Long-term seizures, coma, or lowered consciousness. ¨ Permanent brain damage. These are so rare it is hard to tell if they are caused by the vaccine. Controlling fever is especially important for children who have had seizures, for any reason. It is also important if another family member has had seizures. You can reduce fever and pain by giving your child an aspirin-free pain reliever when the shot is given, and for the next 24 hours, following the package instructions. What if there is a serious reaction? What should I look for? · Look for anything that concerns you, such as signs of a severe allergic reaction, very high fever, or behavior changes. Signs of a severe allergic reaction can include hives, swelling of the face and throat, difficulty breathing, a fast heartbeat, dizziness, and weakness.  These would start a few minutes to a few hours after the vaccination. What should I do? · If you think it is a severe allergic reaction or other emergency that can't wait, call 9-1-1 or get the person to the nearest hospital. Otherwise, call your doctor. · Afterward, the reaction should be reported to the Vaccine Adverse Event Reporting System (VAERS). Your doctor might file this report, or you can do it yourself through the VAERS web site at www.vaers. [facility].gov, or by calling 4-871.611.1630. VAERS is only for reporting reactions. They do not give medical advice. The National Vaccine Injury Compensation Program  The National Vaccine Injury Compensation Program (VICP) is a federal program that was created to compensate people who may have been injured by certain vaccines. Persons who believe they may have been injured by a vaccine can learn about the program and about filing a claim by calling 6-642.308.3174 or visiting the [employer] website at www.[employer].gov/vaccinecompensation. How can I learn more? · Ask your doctor. · Call your local or state health department. · Contact the Centers for Disease Control and Prevention (CDC):  ¨ Call 0-349.649.4575 (1-800-CDC-INFO) or  ¨ Visit CDC's website at www.cdc.gov/vaccines  Vaccine Information Statement  DTaP (Tetanus, Diphtheria, Pertussis ) Vaccine  (5/17/2007)  42 YENNIFER Deutsch [id number]  Department of Health and Human Services  Centers for Disease Control and Prevention  Many Vaccine Information Statements are available in Taiwanese and other languages. See www.immunize.org/vis. Muchas hojas de información sobre vacunas están disponibles en español y en otros idiomas. Visite www.immunize.org/vis. Care instructions adapted under license by [employer] (which disclaims liability or warranty for this information).  If you have questions about a medical condition or this instruction, always ask your healthcare professional. [address] any warranty or liability for your use of this information.    done

## 2019-10-28 ENCOUNTER — APPOINTMENT (OUTPATIENT)
Dept: PULMONOLOGY | Facility: CLINIC | Age: 74
End: 2019-10-28
Payer: MEDICARE

## 2019-10-28 VITALS
SYSTOLIC BLOOD PRESSURE: 123 MMHG | OXYGEN SATURATION: 99 % | WEIGHT: 213 LBS | HEART RATE: 66 BPM | RESPIRATION RATE: 14 BRPM | HEIGHT: 62 IN | BODY MASS INDEX: 39.2 KG/M2 | DIASTOLIC BLOOD PRESSURE: 79 MMHG

## 2019-10-28 DIAGNOSIS — L60.8 OTHER NAIL DISORDERS: ICD-10-CM

## 2019-10-28 LAB — INR PPP: 2.2 RATIO

## 2019-10-28 PROCEDURE — 85610 PROTHROMBIN TIME: CPT | Mod: QW

## 2019-10-28 PROCEDURE — 36415 COLL VENOUS BLD VENIPUNCTURE: CPT

## 2019-10-28 PROCEDURE — 99213 OFFICE O/P EST LOW 20 MIN: CPT | Mod: 25

## 2019-10-29 LAB
ALBUMIN SERPL ELPH-MCNC: 4 G/DL
ALP BLD-CCNC: 92 U/L
ALT SERPL-CCNC: 15 U/L
ANION GAP SERPL CALC-SCNC: 14 MMOL/L
AST SERPL-CCNC: 21 U/L
BASOPHILS # BLD AUTO: 0.05 K/UL
BASOPHILS NFR BLD AUTO: 1.1 %
BILIRUB SERPL-MCNC: 0.3 MG/DL
BUN SERPL-MCNC: 18 MG/DL
CALCIUM SERPL-MCNC: 9.3 MG/DL
CHLORIDE SERPL-SCNC: 95 MMOL/L
CHOLEST SERPL-MCNC: 151 MG/DL
CHOLEST/HDLC SERPL: 1.9 RATIO
CO2 SERPL-SCNC: 28 MMOL/L
CREAT SERPL-MCNC: 0.61 MG/DL
EOSINOPHIL # BLD AUTO: 0.09 K/UL
EOSINOPHIL NFR BLD AUTO: 1.9 %
FERRITIN SERPL-MCNC: 118 NG/ML
GLUCOSE SERPL-MCNC: 72 MG/DL
HCT VFR BLD CALC: 34.7 %
HDLC SERPL-MCNC: 80 MG/DL
HGB BLD-MCNC: 10.7 G/DL
IMM GRANULOCYTES NFR BLD AUTO: 0.2 %
IRON SATN MFR SERPL: 24 %
IRON SERPL-MCNC: 72 UG/DL
LDLC SERPL CALC-MCNC: 58 MG/DL
LYMPHOCYTES # BLD AUTO: 1.75 K/UL
LYMPHOCYTES NFR BLD AUTO: 36.8 %
MAN DIFF?: NORMAL
MCHC RBC-ENTMCNC: 22.4 PG
MCHC RBC-ENTMCNC: 30.8 GM/DL
MCV RBC AUTO: 72.6 FL
MONOCYTES # BLD AUTO: 0.49 K/UL
MONOCYTES NFR BLD AUTO: 10.3 %
NEUTROPHILS # BLD AUTO: 2.36 K/UL
NEUTROPHILS NFR BLD AUTO: 49.7 %
PLATELET # BLD AUTO: 317 K/UL
POTASSIUM SERPL-SCNC: 3.9 MMOL/L
PROT SERPL-MCNC: 6.7 G/DL
RBC # BLD: 4.78 M/UL
RBC # FLD: 18.6 %
SODIUM SERPL-SCNC: 137 MMOL/L
TIBC SERPL-MCNC: 299 UG/DL
TRIGL SERPL-MCNC: 66 MG/DL
UIBC SERPL-MCNC: 227 UG/DL
WBC # FLD AUTO: 4.75 K/UL

## 2019-11-21 ENCOUNTER — RX RENEWAL (OUTPATIENT)
Age: 74
End: 2019-11-21

## 2019-11-25 ENCOUNTER — APPOINTMENT (OUTPATIENT)
Dept: PULMONOLOGY | Facility: CLINIC | Age: 74
End: 2019-11-25
Payer: MEDICARE

## 2019-11-25 VITALS
HEART RATE: 64 BPM | DIASTOLIC BLOOD PRESSURE: 82 MMHG | RESPIRATION RATE: 15 BRPM | OXYGEN SATURATION: 98 % | TEMPERATURE: 97.7 F | SYSTOLIC BLOOD PRESSURE: 129 MMHG | WEIGHT: 213 LBS | BODY MASS INDEX: 39.2 KG/M2 | HEIGHT: 62 IN

## 2019-11-25 DIAGNOSIS — H92.09 OTALGIA, UNSPECIFIED EAR: ICD-10-CM

## 2019-11-25 LAB — INR PPP: 2.8 RATIO

## 2019-11-25 PROCEDURE — 85610 PROTHROMBIN TIME: CPT | Mod: QW

## 2019-11-25 PROCEDURE — 99213 OFFICE O/P EST LOW 20 MIN: CPT | Mod: 25

## 2019-11-26 ENCOUNTER — RX RENEWAL (OUTPATIENT)
Age: 74
End: 2019-11-26

## 2019-11-26 PROBLEM — H92.09 UNILATERAL OTALGIA: Status: ACTIVE | Noted: 2019-11-26

## 2019-11-26 NOTE — HISTORY OF PRESENT ILLNESS
[FreeTextEntry1] : Here for INR check.\par  Notes trouble hearing out of left ear-did have water inside and inserted q tip

## 2019-12-06 ENCOUNTER — RX RENEWAL (OUTPATIENT)
Age: 74
End: 2019-12-06

## 2019-12-17 ENCOUNTER — RX RENEWAL (OUTPATIENT)
Age: 74
End: 2019-12-17

## 2019-12-18 ENCOUNTER — RX RENEWAL (OUTPATIENT)
Age: 74
End: 2019-12-18

## 2019-12-18 RX ORDER — WARFARIN SODIUM 2.5 MG/1
2.5 TABLET ORAL
Qty: 180 | Refills: 3 | Status: ACTIVE | COMMUNITY
Start: 2019-06-17 | End: 1900-01-01

## 2019-12-18 NOTE — ASU PREOP CHECKLIST - MEDICAL/PEDIATRIC CLEARANCE ON MEDICAL RECORD
Hourly rounding completed. Patients needs addressed and updated on plan of care.     medical,vascular,hematology,cardiac

## 2019-12-23 ENCOUNTER — APPOINTMENT (OUTPATIENT)
Dept: PULMONOLOGY | Facility: CLINIC | Age: 74
End: 2019-12-23
Payer: MEDICARE

## 2019-12-23 VITALS
HEART RATE: 61 BPM | DIASTOLIC BLOOD PRESSURE: 82 MMHG | RESPIRATION RATE: 12 BRPM | OXYGEN SATURATION: 94 % | SYSTOLIC BLOOD PRESSURE: 138 MMHG

## 2019-12-23 LAB — INR PPP: 2.3 RATIO

## 2019-12-23 PROCEDURE — 99212 OFFICE O/P EST SF 10 MIN: CPT | Mod: 25

## 2019-12-23 PROCEDURE — 85610 PROTHROMBIN TIME: CPT | Mod: QW

## 2020-01-21 ENCOUNTER — APPOINTMENT (OUTPATIENT)
Dept: PULMONOLOGY | Facility: CLINIC | Age: 75
End: 2020-01-21
Payer: MEDICARE

## 2020-01-21 VITALS — HEART RATE: 61 BPM | OXYGEN SATURATION: 100 % | DIASTOLIC BLOOD PRESSURE: 74 MMHG | SYSTOLIC BLOOD PRESSURE: 124 MMHG

## 2020-01-21 LAB — INR PPP: 2.2 RATIO

## 2020-01-21 PROCEDURE — 99212 OFFICE O/P EST SF 10 MIN: CPT | Mod: 25

## 2020-01-21 PROCEDURE — 85610 PROTHROMBIN TIME: CPT | Mod: QW

## 2020-01-21 NOTE — ASSESSMENT
[FreeTextEntry1] : INR in range-f/u 1 mo\par Having some increased joint pains-recommend followup with rheumatology\par Full labs next month

## 2020-02-01 ENCOUNTER — RX RENEWAL (OUTPATIENT)
Age: 75
End: 2020-02-01

## 2020-02-09 ENCOUNTER — RX RENEWAL (OUTPATIENT)
Age: 75
End: 2020-02-09

## 2020-02-21 ENCOUNTER — APPOINTMENT (OUTPATIENT)
Dept: PULMONOLOGY | Facility: CLINIC | Age: 75
End: 2020-02-21
Payer: MEDICARE

## 2020-02-21 VITALS
RESPIRATION RATE: 14 BRPM | OXYGEN SATURATION: 99 % | DIASTOLIC BLOOD PRESSURE: 82 MMHG | TEMPERATURE: 98.1 F | HEART RATE: 65 BPM | SYSTOLIC BLOOD PRESSURE: 148 MMHG

## 2020-02-21 DIAGNOSIS — R05 COUGH: ICD-10-CM

## 2020-02-21 LAB
FLUAV SPEC QL CULT: NEGATIVE
FLUBV AG SPEC QL IA: NEGATIVE
INR PPP: 2.7 RATIO
QUALITY CONTROL: YES

## 2020-02-21 PROCEDURE — 71046 X-RAY EXAM CHEST 2 VIEWS: CPT

## 2020-02-21 PROCEDURE — 87804 INFLUENZA ASSAY W/OPTIC: CPT | Mod: QW

## 2020-02-21 PROCEDURE — 99214 OFFICE O/P EST MOD 30 MIN: CPT

## 2020-02-21 PROCEDURE — 85610 PROTHROMBIN TIME: CPT | Mod: QW

## 2020-02-22 NOTE — HISTORY OF PRESENT ILLNESS
[Never] : never [TextBox_4] : Here for INR check and labs\par needs refill on lyrica\par also has cough, subjective fever, body aches\par \par

## 2020-02-23 LAB
24R-OH-CALCIDIOL SERPL-MCNC: 60.4 PG/ML
25(OH)D3 SERPL-MCNC: 35.2 NG/ML
ALBUMIN SERPL ELPH-MCNC: 4.4 G/DL
ALP BLD-CCNC: 103 U/L
ALT SERPL-CCNC: 10 U/L
ANION GAP SERPL CALC-SCNC: 15 MMOL/L
AST SERPL-CCNC: 18 U/L
BASOPHILS # BLD AUTO: 0.05 K/UL
BASOPHILS NFR BLD AUTO: 0.7 %
BILIRUB SERPL-MCNC: 0.4 MG/DL
BUN SERPL-MCNC: 11 MG/DL
CALCIUM SERPL-MCNC: 9.6 MG/DL
CHLORIDE SERPL-SCNC: 96 MMOL/L
CHOLEST SERPL-MCNC: 155 MG/DL
CHOLEST/HDLC SERPL: 1.9 RATIO
CO2 SERPL-SCNC: 27 MMOL/L
CREAT SERPL-MCNC: 0.63 MG/DL
EOSINOPHIL # BLD AUTO: 0.06 K/UL
EOSINOPHIL NFR BLD AUTO: 0.8 %
GLUCOSE SERPL-MCNC: 94 MG/DL
HCT VFR BLD CALC: 38.7 %
HDLC SERPL-MCNC: 81 MG/DL
HGB BLD-MCNC: 12.3 G/DL
IMM GRANULOCYTES NFR BLD AUTO: 0.4 %
LDLC SERPL CALC-MCNC: 59 MG/DL
LYMPHOCYTES # BLD AUTO: 1.74 K/UL
LYMPHOCYTES NFR BLD AUTO: 23.9 %
MAN DIFF?: NORMAL
MCHC RBC-ENTMCNC: 22.5 PG
MCHC RBC-ENTMCNC: 31.8 GM/DL
MCV RBC AUTO: 70.7 FL
MONOCYTES # BLD AUTO: 0.78 K/UL
MONOCYTES NFR BLD AUTO: 10.7 %
NEUTROPHILS # BLD AUTO: 4.61 K/UL
NEUTROPHILS NFR BLD AUTO: 63.5 %
PLATELET # BLD AUTO: 359 K/UL
POTASSIUM SERPL-SCNC: 3.8 MMOL/L
PROT SERPL-MCNC: 7 G/DL
RAPID RVP RESULT: NOT DETECTED
RBC # BLD: 5.47 M/UL
RBC # FLD: 16.4 %
SODIUM SERPL-SCNC: 138 MMOL/L
TRIGL SERPL-MCNC: 77 MG/DL
TSH SERPL-ACNC: 1.67 UIU/ML
WBC # FLD AUTO: 7.27 K/UL

## 2020-03-09 ENCOUNTER — RX RENEWAL (OUTPATIENT)
Age: 75
End: 2020-03-09

## 2020-03-20 ENCOUNTER — TRANSCRIPTION ENCOUNTER (OUTPATIENT)
Age: 75
End: 2020-03-20

## 2020-03-23 ENCOUNTER — APPOINTMENT (OUTPATIENT)
Dept: PULMONOLOGY | Facility: CLINIC | Age: 75
End: 2020-03-23

## 2020-03-24 LAB
INR PPP: 2.46 RATIO
PT BLD: 28.6 SEC

## 2020-04-08 NOTE — H&P ADULT - DOES THIS PATIENT HAVE A HISTORY OF OR HAS BEEN DX WITH HEART FAILURE?
Called patient as follow up to diabetes basics class.  Left message with spouse, she can call with any questions or concerns.  Her A1c is significantly improved   no

## 2020-04-13 LAB
CYTOLOGY CVX/VAG DOC THIN PREP: NORMAL
HPV HIGH+LOW RISK DNA PNL CVX: NOT DETECTED

## 2020-04-15 ENCOUNTER — RX RENEWAL (OUTPATIENT)
Age: 75
End: 2020-04-15

## 2020-05-04 NOTE — ED PROVIDER NOTE - GASTROINTESTINAL, MLM
"Initial /80 (BP Location: Right arm, Patient Position: Chair, Cuff Size: Adult Large)   Pulse 91   Temp 98.2  F (36.8  C) (Tympanic)   Resp 18   Ht 1.803 m (5' 11\")   Wt 104.3 kg (230 lb)   BMI 32.08 kg/m   Estimated body mass index is 32.08 kg/m  as calculated from the following:    Height as of this encounter: 1.803 m (5' 11\").    Weight as of this encounter: 104.3 kg (230 lb). .      "
Abdomen soft, non-tender, no guarding.

## 2020-06-08 ENCOUNTER — APPOINTMENT (OUTPATIENT)
Dept: RHEUMATOLOGY | Facility: CLINIC | Age: 75
End: 2020-06-08
Payer: MEDICARE

## 2020-06-08 PROCEDURE — 99442: CPT

## 2020-06-09 ENCOUNTER — APPOINTMENT (OUTPATIENT)
Dept: VASCULAR SURGERY | Facility: CLINIC | Age: 75
End: 2020-06-09
Payer: MEDICARE

## 2020-06-09 VITALS
BODY MASS INDEX: 43.43 KG/M2 | HEART RATE: 65 BPM | HEIGHT: 62 IN | SYSTOLIC BLOOD PRESSURE: 145 MMHG | DIASTOLIC BLOOD PRESSURE: 89 MMHG | WEIGHT: 236 LBS

## 2020-06-09 PROCEDURE — 99202 OFFICE O/P NEW SF 15 MIN: CPT

## 2020-06-09 PROCEDURE — 93971 EXTREMITY STUDY: CPT

## 2020-06-22 ENCOUNTER — APPOINTMENT (OUTPATIENT)
Dept: RADIOLOGY | Facility: IMAGING CENTER | Age: 75
End: 2020-06-22
Payer: MEDICARE

## 2020-06-22 ENCOUNTER — RESULT REVIEW (OUTPATIENT)
Age: 75
End: 2020-06-22

## 2020-06-22 ENCOUNTER — OUTPATIENT (OUTPATIENT)
Dept: OUTPATIENT SERVICES | Facility: HOSPITAL | Age: 75
LOS: 1 days | End: 2020-06-22
Payer: COMMERCIAL

## 2020-06-22 DIAGNOSIS — Z98.89 OTHER SPECIFIED POSTPROCEDURAL STATES: Chronic | ICD-10-CM

## 2020-06-22 DIAGNOSIS — Z01.419 ENCOUNTER FOR GYNECOLOGICAL EXAMINATION (GENERAL) (ROUTINE) WITHOUT ABNORMAL FINDINGS: ICD-10-CM

## 2020-06-22 DIAGNOSIS — Z90.49 ACQUIRED ABSENCE OF OTHER SPECIFIED PARTS OF DIGESTIVE TRACT: Chronic | ICD-10-CM

## 2020-06-22 DIAGNOSIS — I83.93 ASYMPTOMATIC VARICOSE VEINS OF BILATERAL LOWER EXTREMITIES: Chronic | ICD-10-CM

## 2020-06-22 DIAGNOSIS — Z98.890 OTHER SPECIFIED POSTPROCEDURAL STATES: Chronic | ICD-10-CM

## 2020-06-22 DIAGNOSIS — Z96.651 PRESENCE OF RIGHT ARTIFICIAL KNEE JOINT: Chronic | ICD-10-CM

## 2020-06-22 DIAGNOSIS — Z98.1 ARTHRODESIS STATUS: Chronic | ICD-10-CM

## 2020-06-22 LAB
25(OH)D3 SERPL-MCNC: 31.5 NG/ML
ALBUMIN SERPL ELPH-MCNC: 4.4 G/DL
ALP BLD-CCNC: 83 U/L
ALT SERPL-CCNC: 13 U/L
ANION GAP SERPL CALC-SCNC: 15 MMOL/L
AST SERPL-CCNC: 18 U/L
BASOPHILS # BLD AUTO: 0.04 K/UL
BASOPHILS NFR BLD AUTO: 0.9 %
BILIRUB SERPL-MCNC: 0.3 MG/DL
BUN SERPL-MCNC: 19 MG/DL
CALCIUM SERPL-MCNC: 9.5 MG/DL
CHLORIDE SERPL-SCNC: 96 MMOL/L
CO2 SERPL-SCNC: 28 MMOL/L
CREAT SERPL-MCNC: 0.68 MG/DL
CRP SERPL-MCNC: 0.3 MG/DL
EOSINOPHIL # BLD AUTO: 0.06 K/UL
EOSINOPHIL NFR BLD AUTO: 1.4 %
ERYTHROCYTE [SEDIMENTATION RATE] IN BLOOD BY WESTERGREN METHOD: 38 MM/HR
GLUCOSE SERPL-MCNC: 96 MG/DL
HCT VFR BLD CALC: 36.1 %
HGB BLD-MCNC: 11.3 G/DL
IMM GRANULOCYTES NFR BLD AUTO: 0.2 %
LYMPHOCYTES # BLD AUTO: 1.37 K/UL
LYMPHOCYTES NFR BLD AUTO: 30.9 %
MAN DIFF?: NORMAL
MCHC RBC-ENTMCNC: 22.3 PG
MCHC RBC-ENTMCNC: 31.3 GM/DL
MCV RBC AUTO: 71.2 FL
MONOCYTES # BLD AUTO: 0.62 K/UL
MONOCYTES NFR BLD AUTO: 14 %
NEUTROPHILS # BLD AUTO: 2.33 K/UL
NEUTROPHILS NFR BLD AUTO: 52.6 %
PLATELET # BLD AUTO: 324 K/UL
POTASSIUM SERPL-SCNC: 4 MMOL/L
PROT SERPL-MCNC: 6.7 G/DL
RBC # BLD: 5.07 M/UL
RBC # FLD: 16.6 %
SODIUM SERPL-SCNC: 138 MMOL/L
WBC # FLD AUTO: 4.43 K/UL

## 2020-06-22 PROCEDURE — 77080 DXA BONE DENSITY AXIAL: CPT

## 2020-06-22 PROCEDURE — 77080 DXA BONE DENSITY AXIAL: CPT | Mod: 26

## 2020-07-07 NOTE — ED PROVIDER NOTE - EYES NEGATIVE STATEMENT, MLM
Requested Prescriptions     Pending Prescriptions Disp Refills    latanoprost (XALATAN) 0.005 % ophthalmic solution 1 Bottle 6     Sig: INT 1 GTT IN OU HS   Patient requesting a medication refill.   Ph[facility]  Next office visit: 8/27/20  Last regular office visit: 2/27/20 no discharge, no irritation, no pain, no redness, and no visual changes.

## 2020-07-07 NOTE — HISTORY OF PRESENT ILLNESS
[FreeTextEntry1] : A 74 year old woman with long standing history of bilateral DVT. she presents with one month history of left leg swelling. she has had an IVC filter in the past and is on lifelong AC for repeated DVT/PE. she denies shortness of breath. She currently wears 15-20 mmHg compression stockings

## 2020-07-10 ENCOUNTER — RX RENEWAL (OUTPATIENT)
Age: 75
End: 2020-07-10

## 2020-08-01 NOTE — H&P PST ADULT - VENOUS THROMBOEMBOLISM AGE
dressing equipment/shower chair/rolling walker (5 inch wheels)/bathing equipment/raised toilet seat
60-75 yrs

## 2020-08-10 ENCOUNTER — APPOINTMENT (OUTPATIENT)
Dept: ORTHOPEDIC SURGERY | Facility: CLINIC | Age: 75
End: 2020-08-10
Payer: MEDICARE

## 2020-08-10 VITALS — TEMPERATURE: 97.5 F

## 2020-08-10 VITALS
SYSTOLIC BLOOD PRESSURE: 145 MMHG | DIASTOLIC BLOOD PRESSURE: 89 MMHG | HEART RATE: 70 BPM | HEIGHT: 62 IN | BODY MASS INDEX: 44.16 KG/M2 | WEIGHT: 240 LBS

## 2020-08-10 DIAGNOSIS — R60.0 LOCALIZED EDEMA: ICD-10-CM

## 2020-08-10 DIAGNOSIS — M79.605 PAIN IN LEFT LEG: ICD-10-CM

## 2020-08-10 PROCEDURE — 73590 X-RAY EXAM OF LOWER LEG: CPT | Mod: LT

## 2020-08-10 PROCEDURE — 99214 OFFICE O/P EST MOD 30 MIN: CPT

## 2020-08-10 NOTE — DISCUSSION/SUMMARY
[de-identified] : The patient has left leg lymphedema and stiffness.  Her heel pain is secondary to plantar fasciitis.  I have discussed the pathology and natural history with her.  She is advised to perform calf stretching exercises.  I offered her a physical therapy referral but she declined.  She will return as needed.

## 2020-08-10 NOTE — PHYSICAL EXAM
[Slightly Antalgic] : slightly antalgic [Walker] : ambulates with walker [0] : right 0 [Normal] : Alert and in no acute distress [Poor Appearance] : well-appearing [Acute Distress] : not in acute distress [de-identified] : The patient has no respiratory distress. Mood and affect are normal. The patient is alert and oriented to person, place and time.\par The patient reports mild pain with motion of both knees.  There is no instability of the knees.  She has an anterior scar on her right knee.  She has lymphedema of the left lower extremity distal to the knee.  She has no tenderness of the calf.  The Achilles tendon is intact bilaterally.  There is no instability of the ankles.  There is mild pain with ankle motion. [de-identified] : AP and lateral x-rays of the left tibia and fibula demonstrate no fracture or dislocation.  At the edge of the image there is evidence of osteoarthritis of the left knee and left flatfoot [Obese] : obese

## 2020-08-10 NOTE — HISTORY OF PRESENT ILLNESS
[de-identified] : 74 year old female presents today c/o pain in the left calf radiating to her heel x 3 months. She has a history of chronic DVT/PE. In May 2020 she woke up with redness and swelling of the left lower extremity. She was evaluated by vascular and was diagnosed with DVT and lymphedema. She is currently wearing compression stocking and awaiting a lymphedema pump. She complains of pain in her calf radiating to her Achilles which is worse in the morning. She has difficulty walking and has to exercise her ankle before she can take a few steps. She is on Coumadin.

## 2020-08-17 ENCOUNTER — APPOINTMENT (OUTPATIENT)
Dept: RADIOLOGY | Facility: IMAGING CENTER | Age: 75
End: 2020-08-17
Payer: MEDICARE

## 2020-08-17 ENCOUNTER — OUTPATIENT (OUTPATIENT)
Dept: OUTPATIENT SERVICES | Facility: HOSPITAL | Age: 75
LOS: 1 days | End: 2020-08-17
Payer: COMMERCIAL

## 2020-08-17 DIAGNOSIS — Z96.651 PRESENCE OF RIGHT ARTIFICIAL KNEE JOINT: Chronic | ICD-10-CM

## 2020-08-17 DIAGNOSIS — Z98.89 OTHER SPECIFIED POSTPROCEDURAL STATES: Chronic | ICD-10-CM

## 2020-08-17 DIAGNOSIS — Z90.49 ACQUIRED ABSENCE OF OTHER SPECIFIED PARTS OF DIGESTIVE TRACT: Chronic | ICD-10-CM

## 2020-08-17 DIAGNOSIS — M54.9 DORSALGIA, UNSPECIFIED: ICD-10-CM

## 2020-08-17 DIAGNOSIS — Z98.890 OTHER SPECIFIED POSTPROCEDURAL STATES: Chronic | ICD-10-CM

## 2020-08-17 DIAGNOSIS — I83.93 ASYMPTOMATIC VARICOSE VEINS OF BILATERAL LOWER EXTREMITIES: Chronic | ICD-10-CM

## 2020-08-17 DIAGNOSIS — Z98.1 ARTHRODESIS STATUS: Chronic | ICD-10-CM

## 2020-08-17 PROCEDURE — 71046 X-RAY EXAM CHEST 2 VIEWS: CPT | Mod: 26

## 2020-08-17 PROCEDURE — 72070 X-RAY EXAM THORAC SPINE 2VWS: CPT | Mod: 26

## 2020-08-17 PROCEDURE — 71046 X-RAY EXAM CHEST 2 VIEWS: CPT

## 2020-08-17 PROCEDURE — 72050 X-RAY EXAM NECK SPINE 4/5VWS: CPT | Mod: 26

## 2020-08-17 PROCEDURE — 72050 X-RAY EXAM NECK SPINE 4/5VWS: CPT

## 2020-08-17 PROCEDURE — 72070 X-RAY EXAM THORAC SPINE 2VWS: CPT

## 2020-09-08 ENCOUNTER — APPOINTMENT (OUTPATIENT)
Dept: MRI IMAGING | Facility: CLINIC | Age: 75
End: 2020-09-08
Payer: MEDICARE

## 2020-09-08 ENCOUNTER — OUTPATIENT (OUTPATIENT)
Dept: OUTPATIENT SERVICES | Facility: HOSPITAL | Age: 75
LOS: 1 days | End: 2020-09-08
Payer: COMMERCIAL

## 2020-09-08 DIAGNOSIS — Z98.89 OTHER SPECIFIED POSTPROCEDURAL STATES: Chronic | ICD-10-CM

## 2020-09-08 DIAGNOSIS — Z00.8 ENCOUNTER FOR OTHER GENERAL EXAMINATION: ICD-10-CM

## 2020-09-08 DIAGNOSIS — Z90.49 ACQUIRED ABSENCE OF OTHER SPECIFIED PARTS OF DIGESTIVE TRACT: Chronic | ICD-10-CM

## 2020-09-08 DIAGNOSIS — Z98.1 ARTHRODESIS STATUS: Chronic | ICD-10-CM

## 2020-09-08 DIAGNOSIS — Z98.890 OTHER SPECIFIED POSTPROCEDURAL STATES: Chronic | ICD-10-CM

## 2020-09-08 DIAGNOSIS — Z96.651 PRESENCE OF RIGHT ARTIFICIAL KNEE JOINT: Chronic | ICD-10-CM

## 2020-09-08 DIAGNOSIS — I83.93 ASYMPTOMATIC VARICOSE VEINS OF BILATERAL LOWER EXTREMITIES: Chronic | ICD-10-CM

## 2020-09-08 PROCEDURE — 72141 MRI NECK SPINE W/O DYE: CPT

## 2020-09-08 PROCEDURE — 72141 MRI NECK SPINE W/O DYE: CPT | Mod: 26

## 2020-09-10 ENCOUNTER — APPOINTMENT (OUTPATIENT)
Dept: RHEUMATOLOGY | Facility: CLINIC | Age: 75
End: 2020-09-10
Payer: MEDICARE

## 2020-09-10 VITALS
OXYGEN SATURATION: 98 % | WEIGHT: 238 LBS | HEIGHT: 62 IN | BODY MASS INDEX: 43.79 KG/M2 | DIASTOLIC BLOOD PRESSURE: 88 MMHG | SYSTOLIC BLOOD PRESSURE: 153 MMHG | HEART RATE: 68 BPM | TEMPERATURE: 97.3 F

## 2020-09-10 DIAGNOSIS — R35.8 XXOTHER POLYURIA: ICD-10-CM

## 2020-09-10 DIAGNOSIS — R35.1 NOCTURIA: ICD-10-CM

## 2020-09-10 DIAGNOSIS — Z87.898 PERSONAL HISTORY OF OTHER SPECIFIED CONDITIONS: ICD-10-CM

## 2020-09-10 PROCEDURE — 99214 OFFICE O/P EST MOD 30 MIN: CPT

## 2020-09-16 LAB
ALBUMIN SERPL ELPH-MCNC: 4.4 G/DL
ALP BLD-CCNC: 131 U/L
ALT SERPL-CCNC: 13 U/L
ANION GAP SERPL CALC-SCNC: 12 MMOL/L
APPEARANCE: CLEAR
AST SERPL-CCNC: 19 U/L
BACTERIA UR CULT: NORMAL
BACTERIA: NEGATIVE
BASOPHILS # BLD AUTO: 0.07 K/UL
BASOPHILS NFR BLD AUTO: 1.1 %
BILIRUB SERPL-MCNC: 0.2 MG/DL
BILIRUBIN URINE: NEGATIVE
BLOOD URINE: NEGATIVE
BUN SERPL-MCNC: 19 MG/DL
CALCIUM SERPL-MCNC: 9.3 MG/DL
CHLORIDE SERPL-SCNC: 93 MMOL/L
CO2 SERPL-SCNC: 31 MMOL/L
COLOR: NORMAL
CREAT SERPL-MCNC: 0.76 MG/DL
CRP SERPL-MCNC: 0.4 MG/DL
EOSINOPHIL # BLD AUTO: 0.1 K/UL
EOSINOPHIL NFR BLD AUTO: 1.6 %
ERYTHROCYTE [SEDIMENTATION RATE] IN BLOOD BY WESTERGREN METHOD: 83 MM/HR
ESTIMATED AVERAGE GLUCOSE: 114 MG/DL
GLUCOSE QUALITATIVE U: NEGATIVE
GLUCOSE SERPL-MCNC: 98 MG/DL
HBA1C MFR BLD HPLC: 5.6 %
HCT VFR BLD CALC: 37.8 %
HGB BLD-MCNC: 11.7 G/DL
HYALINE CASTS: 0 /LPF
IMM GRANULOCYTES NFR BLD AUTO: 0.5 %
KETONES URINE: NEGATIVE
LEUKOCYTE ESTERASE URINE: NEGATIVE
LYMPHOCYTES # BLD AUTO: 1.92 K/UL
LYMPHOCYTES NFR BLD AUTO: 30.2 %
MAN DIFF?: NORMAL
MCHC RBC-ENTMCNC: 22.6 PG
MCHC RBC-ENTMCNC: 31 GM/DL
MCV RBC AUTO: 73 FL
MICROSCOPIC-UA: NORMAL
MONOCYTES # BLD AUTO: 0.74 K/UL
MONOCYTES NFR BLD AUTO: 11.7 %
NEUTROPHILS # BLD AUTO: 3.49 K/UL
NEUTROPHILS NFR BLD AUTO: 54.9 %
NITRITE URINE: NEGATIVE
PH URINE: 7.5
PLATELET # BLD AUTO: 361 K/UL
POTASSIUM SERPL-SCNC: 4.1 MMOL/L
PROT SERPL-MCNC: 7 G/DL
PROTEIN URINE: NEGATIVE
RBC # BLD: 5.18 M/UL
RBC # FLD: 18.6 %
RED BLOOD CELLS URINE: 0 /HPF
SODIUM SERPL-SCNC: 136 MMOL/L
SPECIFIC GRAVITY URINE: 1.01
SQUAMOUS EPITHELIAL CELLS: 0 /HPF
UROBILINOGEN URINE: NORMAL
WBC # FLD AUTO: 6.35 K/UL
WHITE BLOOD CELLS URINE: 0 /HPF

## 2020-10-02 ENCOUNTER — RX RENEWAL (OUTPATIENT)
Age: 75
End: 2020-10-02

## 2020-11-03 ENCOUNTER — RX RENEWAL (OUTPATIENT)
Age: 75
End: 2020-11-03

## 2020-11-11 ENCOUNTER — TRANSCRIPTION ENCOUNTER (OUTPATIENT)
Age: 75
End: 2020-11-11

## 2020-11-14 ENCOUNTER — INPATIENT (INPATIENT)
Facility: HOSPITAL | Age: 75
LOS: 1 days | Discharge: HOME | End: 2020-11-16
Attending: INTERNAL MEDICINE | Admitting: INTERNAL MEDICINE
Payer: MEDICARE

## 2020-11-14 VITALS
TEMPERATURE: 98 F | HEIGHT: 62 IN | DIASTOLIC BLOOD PRESSURE: 60 MMHG | HEART RATE: 66 BPM | OXYGEN SATURATION: 100 % | SYSTOLIC BLOOD PRESSURE: 103 MMHG | RESPIRATION RATE: 17 BRPM

## 2020-11-14 DIAGNOSIS — Z98.89 OTHER SPECIFIED POSTPROCEDURAL STATES: Chronic | ICD-10-CM

## 2020-11-14 DIAGNOSIS — I10 ESSENTIAL (PRIMARY) HYPERTENSION: ICD-10-CM

## 2020-11-14 DIAGNOSIS — L03.90 CELLULITIS, UNSPECIFIED: ICD-10-CM

## 2020-11-14 DIAGNOSIS — Z90.49 ACQUIRED ABSENCE OF OTHER SPECIFIED PARTS OF DIGESTIVE TRACT: Chronic | ICD-10-CM

## 2020-11-14 DIAGNOSIS — Z86.711 PERSONAL HISTORY OF PULMONARY EMBOLISM: ICD-10-CM

## 2020-11-14 DIAGNOSIS — I83.93 ASYMPTOMATIC VARICOSE VEINS OF BILATERAL LOWER EXTREMITIES: Chronic | ICD-10-CM

## 2020-11-14 DIAGNOSIS — Z98.890 OTHER SPECIFIED POSTPROCEDURAL STATES: Chronic | ICD-10-CM

## 2020-11-14 DIAGNOSIS — Z98.1 ARTHRODESIS STATUS: Chronic | ICD-10-CM

## 2020-11-14 DIAGNOSIS — G62.9 POLYNEUROPATHY, UNSPECIFIED: ICD-10-CM

## 2020-11-14 DIAGNOSIS — Z96.651 PRESENCE OF RIGHT ARTIFICIAL KNEE JOINT: Chronic | ICD-10-CM

## 2020-11-14 DIAGNOSIS — J45.909 UNSPECIFIED ASTHMA, UNCOMPLICATED: ICD-10-CM

## 2020-11-14 LAB
ALBUMIN SERPL ELPH-MCNC: 4 G/DL — SIGNIFICANT CHANGE UP (ref 3.3–5)
ALP SERPL-CCNC: 76 U/L — SIGNIFICANT CHANGE UP (ref 40–120)
ALT FLD-CCNC: 12 U/L — SIGNIFICANT CHANGE UP (ref 4–33)
ANION GAP SERPL CALC-SCNC: 12 MMO/L — SIGNIFICANT CHANGE UP (ref 7–14)
APPEARANCE UR: CLEAR — SIGNIFICANT CHANGE UP
APTT BLD: 41.1 SEC — HIGH (ref 27–36.3)
AST SERPL-CCNC: 17 U/L — SIGNIFICANT CHANGE UP (ref 4–32)
BASOPHILS # BLD AUTO: 0.02 K/UL — SIGNIFICANT CHANGE UP (ref 0–0.2)
BASOPHILS NFR BLD AUTO: 0.2 % — SIGNIFICANT CHANGE UP (ref 0–2)
BILIRUB SERPL-MCNC: 0.5 MG/DL — SIGNIFICANT CHANGE UP (ref 0.2–1.2)
BILIRUB UR-MCNC: NEGATIVE — SIGNIFICANT CHANGE UP
BLOOD UR QL VISUAL: NEGATIVE — SIGNIFICANT CHANGE UP
BUN SERPL-MCNC: 22 MG/DL — SIGNIFICANT CHANGE UP (ref 7–23)
CALCIUM SERPL-MCNC: 9.1 MG/DL — SIGNIFICANT CHANGE UP (ref 8.4–10.5)
CHLORIDE SERPL-SCNC: 94 MMOL/L — LOW (ref 98–107)
CO2 SERPL-SCNC: 27 MMOL/L — SIGNIFICANT CHANGE UP (ref 22–31)
COLOR SPEC: YELLOW — SIGNIFICANT CHANGE UP
CREAT SERPL-MCNC: 0.81 MG/DL — SIGNIFICANT CHANGE UP (ref 0.5–1.3)
EOSINOPHIL # BLD AUTO: 0.01 K/UL — SIGNIFICANT CHANGE UP (ref 0–0.5)
EOSINOPHIL NFR BLD AUTO: 0.1 % — SIGNIFICANT CHANGE UP (ref 0–6)
GLUCOSE SERPL-MCNC: 95 MG/DL — SIGNIFICANT CHANGE UP (ref 70–99)
GLUCOSE UR-MCNC: NEGATIVE — SIGNIFICANT CHANGE UP
HCT VFR BLD CALC: 34.3 % — LOW (ref 34.5–45)
HGB BLD-MCNC: 10.9 G/DL — LOW (ref 11.5–15.5)
IMM GRANULOCYTES NFR BLD AUTO: 0.6 % — SIGNIFICANT CHANGE UP (ref 0–1.5)
INR BLD: 3.02 — HIGH (ref 0.88–1.16)
KETONES UR-MCNC: NEGATIVE — SIGNIFICANT CHANGE UP
LEUKOCYTE ESTERASE UR-ACNC: NEGATIVE — SIGNIFICANT CHANGE UP
LYMPHOCYTES # BLD AUTO: 1.66 K/UL — SIGNIFICANT CHANGE UP (ref 1–3.3)
LYMPHOCYTES # BLD AUTO: 15.7 % — SIGNIFICANT CHANGE UP (ref 13–44)
MAGNESIUM SERPL-MCNC: 2.1 MG/DL — SIGNIFICANT CHANGE UP (ref 1.6–2.6)
MCHC RBC-ENTMCNC: 22.2 PG — LOW (ref 27–34)
MCHC RBC-ENTMCNC: 31.8 % — LOW (ref 32–36)
MCV RBC AUTO: 69.7 FL — LOW (ref 80–100)
MONOCYTES # BLD AUTO: 0.83 K/UL — SIGNIFICANT CHANGE UP (ref 0–0.9)
MONOCYTES NFR BLD AUTO: 7.9 % — SIGNIFICANT CHANGE UP (ref 2–14)
NEUTROPHILS # BLD AUTO: 7.98 K/UL — HIGH (ref 1.8–7.4)
NEUTROPHILS NFR BLD AUTO: 75.5 % — SIGNIFICANT CHANGE UP (ref 43–77)
NITRITE UR-MCNC: NEGATIVE — SIGNIFICANT CHANGE UP
NRBC # FLD: 0 K/UL — SIGNIFICANT CHANGE UP (ref 0–0)
PH UR: 7.5 — SIGNIFICANT CHANGE UP (ref 5–8)
PHOSPHATE SERPL-MCNC: 3.4 MG/DL — SIGNIFICANT CHANGE UP (ref 2.5–4.5)
PLATELET # BLD AUTO: 298 K/UL — SIGNIFICANT CHANGE UP (ref 150–400)
PMV BLD: 9.6 FL — SIGNIFICANT CHANGE UP (ref 7–13)
POTASSIUM SERPL-MCNC: 3.3 MMOL/L — LOW (ref 3.5–5.3)
POTASSIUM SERPL-SCNC: 3.3 MMOL/L — LOW (ref 3.5–5.3)
PROT SERPL-MCNC: 7.1 G/DL — SIGNIFICANT CHANGE UP (ref 6–8.3)
PROT UR-MCNC: NEGATIVE — SIGNIFICANT CHANGE UP
PROTHROM AB SERPL-ACNC: 33 SEC — HIGH (ref 10.6–13.6)
RBC # BLD: 4.92 M/UL — SIGNIFICANT CHANGE UP (ref 3.8–5.2)
RBC # FLD: 16.3 % — HIGH (ref 10.3–14.5)
SARS-COV-2 RNA SPEC QL NAA+PROBE: SIGNIFICANT CHANGE UP
SODIUM SERPL-SCNC: 133 MMOL/L — LOW (ref 135–145)
SP GR SPEC: 1.02 — SIGNIFICANT CHANGE UP (ref 1–1.04)
UROBILINOGEN FLD QL: NORMAL — SIGNIFICANT CHANGE UP
WBC # BLD: 10.56 K/UL — HIGH (ref 3.8–10.5)
WBC # FLD AUTO: 10.56 K/UL — HIGH (ref 3.8–10.5)

## 2020-11-14 PROCEDURE — 71045 X-RAY EXAM CHEST 1 VIEW: CPT | Mod: 26

## 2020-11-14 PROCEDURE — 93970 EXTREMITY STUDY: CPT | Mod: 26

## 2020-11-14 PROCEDURE — 99285 EMERGENCY DEPT VISIT HI MDM: CPT

## 2020-11-14 RX ORDER — CEFAZOLIN SODIUM 1 G
VIAL (EA) INJECTION
Refills: 0 | Status: DISCONTINUED | OUTPATIENT
Start: 2020-11-14 | End: 2020-11-14

## 2020-11-14 RX ORDER — FLUTICASONE PROPIONATE 50 MCG
2 SPRAY, SUSPENSION NASAL
Refills: 0 | Status: DISCONTINUED | OUTPATIENT
Start: 2020-11-14 | End: 2020-11-16

## 2020-11-14 RX ORDER — CHLORTHALIDONE 50 MG
1 TABLET ORAL
Qty: 0 | Refills: 0 | DISCHARGE

## 2020-11-14 RX ORDER — ALBUTEROL 90 UG/1
2 AEROSOL, METERED ORAL EVERY 6 HOURS
Refills: 0 | Status: DISCONTINUED | OUTPATIENT
Start: 2020-11-14 | End: 2020-11-16

## 2020-11-14 RX ORDER — WARFARIN SODIUM 2.5 MG/1
2.5 TABLET ORAL ONCE
Refills: 0 | Status: COMPLETED | OUTPATIENT
Start: 2020-11-14 | End: 2020-11-14

## 2020-11-14 RX ORDER — HYDROCHLOROTHIAZIDE 25 MG
12.5 TABLET ORAL DAILY
Refills: 0 | Status: DISCONTINUED | OUTPATIENT
Start: 2020-11-14 | End: 2020-11-16

## 2020-11-14 RX ORDER — CEFAZOLIN SODIUM 1 G
1000 VIAL (EA) INJECTION EVERY 8 HOURS
Refills: 0 | Status: DISCONTINUED | OUTPATIENT
Start: 2020-11-14 | End: 2020-11-16

## 2020-11-14 RX ORDER — ALBUTEROL 90 UG/1
2 AEROSOL, METERED ORAL
Qty: 0 | Refills: 0 | DISCHARGE

## 2020-11-14 RX ORDER — FERROUS SULFATE 325(65) MG
325 TABLET ORAL
Qty: 0 | Refills: 0 | DISCHARGE

## 2020-11-14 RX ORDER — POTASSIUM CHLORIDE 20 MEQ
40 PACKET (EA) ORAL ONCE
Refills: 0 | Status: COMPLETED | OUTPATIENT
Start: 2020-11-14 | End: 2020-11-14

## 2020-11-14 RX ORDER — LORATADINE 10 MG/1
10 TABLET ORAL DAILY
Refills: 0 | Status: DISCONTINUED | OUTPATIENT
Start: 2020-11-14 | End: 2020-11-16

## 2020-11-14 RX ORDER — AMLODIPINE BESYLATE 2.5 MG/1
5 TABLET ORAL DAILY
Refills: 0 | Status: DISCONTINUED | OUTPATIENT
Start: 2020-11-14 | End: 2020-11-16

## 2020-11-14 RX ORDER — AMLODIPINE BESYLATE 2.5 MG/1
1 TABLET ORAL
Qty: 0 | Refills: 0 | DISCHARGE

## 2020-11-14 RX ORDER — FLUTICASONE PROPIONATE AND SALMETEROL 50; 250 UG/1; UG/1
1 POWDER ORAL; RESPIRATORY (INHALATION)
Qty: 0 | Refills: 0 | DISCHARGE

## 2020-11-14 RX ORDER — METOPROLOL TARTRATE 50 MG
25 TABLET ORAL
Refills: 0 | Status: DISCONTINUED | OUTPATIENT
Start: 2020-11-14 | End: 2020-11-16

## 2020-11-14 RX ORDER — CEFAZOLIN SODIUM 1 G
1000 VIAL (EA) INJECTION ONCE
Refills: 0 | Status: COMPLETED | OUTPATIENT
Start: 2020-11-14 | End: 2020-11-14

## 2020-11-14 RX ADMIN — Medication 150 MILLIGRAM(S): at 17:46

## 2020-11-14 RX ADMIN — Medication 40 MILLIEQUIVALENT(S): at 17:45

## 2020-11-14 RX ADMIN — Medication 100 MILLIGRAM(S): at 21:59

## 2020-11-14 RX ADMIN — Medication 100 MILLIGRAM(S): at 14:02

## 2020-11-14 NOTE — ED PROVIDER NOTE - ATTENDING CONTRIBUTION TO CARE
DR. BLOCH, ATTENDING MD-  I performed a face to face bedside interview with patient regarding history of present illness, review of symptoms and past medical history. I completed an independent physical exam.  I have discussed patient's plan of care with the resident.  Patient examined, well appearing NAd HEENT nml heart sounds s1s2, lungs clear, abd soft nontender, extrem bilateral pitting edema left much larger than rightwith erythema heat and tenderness.

## 2020-11-14 NOTE — H&P ADULT - NSHPREVIEWOFSYSTEMS_GEN_ALL_CORE
Gen: no loss of wt no loss of appetite  ENT: no dizziness no hearing loss  Ophth: no blurring of vision no loss of vision  Resp: No cough no sputum production  CVS: No chest pain no palpitations no orthopnea  GI: no nausea, vomiting or diarrhea   : no dysuria, hematuria  Endo: no polyuria no excessive sweating  Neuro: no weakness no paresthesias  Heme: No petechiae no easy bruising  Msk: No joint pain no swelling  Skin: see above HPI

## 2020-11-14 NOTE — H&P ADULT - NSHPLABSRESULTS_GEN_ALL_CORE
noted on [place], including lab results and radiology studies  bilateral venous duplex to r/o DVT negative

## 2020-11-14 NOTE — ED PROVIDER NOTE - PMH
Asthma  well controlled, no recent hospitalizations  Flat feet, bilateral    [place] filter in place  2005  History of DVT (deep vein thrombosis)  2005, 2014, 2015  History of pulmonary embolism  2004  HTN (hypertension)    Lung nodule    Neuropathy  right foot  Obesity, morbid, BMI 40.0-49.9    Osteoarthritis    Osteoarthritis of right knee    PE (pulmonary embolism)  s/p [place] Filter- 2005  Polymyalgia rheumatica    Spondylisthesis    Swollen ankles    Thalassemia, beta

## 2020-11-14 NOTE — ED ADULT NURSE NOTE - OBJECTIVE STATEMENT
Pt received spot 29.  AOx4.  Skin warm and dry.  Pt presents with c/o left lower leg edema.  Pt endorses hx of lymphoderma, uses compression stockings for same.  As per pt noted pain to left leg last night and when she removed the compression stocking she noted increased swelling with redness to the leg and some white spots.  Positive swelling noted to same.

## 2020-11-14 NOTE — H&P ADULT - HISTORY OF PRESENT ILLNESS
75 yo F with PMH of asthma, OA, DVT, PE and L leg lymphedema presented with rash on L leg. The patient stated that she had recurrent DVT and PE, currently on Coumadin. L leg lymphedema started in June, also had a rash back then but it resolved with compression stocking. Yesterday, she has sudden rash on L leg, which was painful on touch and pain on walking. Patient denied recent travel, chest pain, SOB, cough, abdominal pain, hematuria, blood in stool

## 2020-11-14 NOTE — H&P ADULT - PROBLEM SELECTOR PLAN 3
INR therapeutic 3.0 today  will give 2.5 mg today to avoid dipping INR dipping below 2 tomorrow   will continue warfarin

## 2020-11-14 NOTE — PATIENT PROFILE ADULT - SURGICAL SITE DESCRIPTION
hysterectomy, appendectomy, hip replacement bilateral, R knee replacement, R ankle triple arthrodesis

## 2020-11-14 NOTE — H&P ADULT - ASSESSMENT
73 yo F with PMH of asthma, OA, DVT, PE and L leg lymphedema presented with rash on L leg consistent with cellulitis likely streptococcal in view of the rapidity of spread, no reason to particularly suspect MRSA

## 2020-11-14 NOTE — H&P ADULT - NSICDXFAMILYHX_GEN_ALL_CORE_FT
FAMILY HISTORY:  Father  Still living? No  Family history of MI (myocardial infarction), Age at diagnosis: Age Unknown

## 2020-11-14 NOTE — H&P ADULT - PROBLEM SELECTOR PLAN 1
s/p Ancef in Emergency Department  will continue 1 gram q 8 for now  consider ID evaluation if not improved tomorrow   no evidence of sepsis

## 2020-11-14 NOTE — H&P ADULT - NSICDXPASTSURGICALHX_GEN_ALL_CORE_FT
PAST SURGICAL HISTORY:  [place] filter in place 2005    H/O eye surgery left retinal tear, 2012    History of ankle fusion right, 2017    History of appendectomy     History of appendectomy 2015    History of hernia surgery 2013    History of total right knee replacement     S/P hip replacement bilateral, 2008, 2009    S/P hysterectomy 1999    Varicose vein of leg cauterization left leg vein 1989

## 2020-11-14 NOTE — PHARMACOTHERAPY INTERVENTION NOTE - COMMENTS
Medication history is complete. Medication list updated in Outpatient Medication Record (OMR). Please call spectra [phone] if you have any questions.

## 2020-11-14 NOTE — ED PROVIDER NOTE - PSH
[place] filter in place  2005  H/O eye surgery  left retinal tear, 2012  History of ankle fusion  right, 2017  History of appendectomy  2015  History of appendectomy    History of hernia surgery  2013  History of total right knee replacement    S/P hip replacement  bilateral, 2008, 2009  S/P hysterectomy  1999  Varicose vein of leg  cauterization left leg vein 1989

## 2020-11-14 NOTE — H&P ADULT - NSHPPHYSICALEXAM_GEN_ALL_CORE
PHYSICAL EXAM: vital signs noted on [place]  in no apparent distress  HEENT: MOHSEN EOMI  Neck: Supple, no JVD, no thyromegaly  Lungs: no wheeze, no crackles  CVS: S1 S2 no M/R/G  Abdomen: no tenderness, no organomegaly, BS present  Neuro: AO x 3 no focal weakness, no sensory abnormalities  Psych: appropriate affect  Skin: extensive erythema and tenderness left leg below knee not fully circumferential   no lymphangitis noted  Ext: no cyanosis or clubbing, no edema  Msk: no joint swelling or deformities  Back: no CVA tenderness, no kyphosis/scoliosis

## 2020-11-14 NOTE — ED ADULT NURSE NOTE - PMH
Asthma  well controlled, no recent hospitalizations  Flat feet, bilateral    Donta filter in place  2005  History of DVT (deep vein thrombosis)  2005, 2014, 2015  History of pulmonary embolism  2004  HTN (hypertension)    Lung nodule    Neuropathy  right foot  Obesity, morbid, BMI 40.0-49.9    Osteoarthritis    Osteoarthritis of right knee    PE (pulmonary embolism)  s/p [place] Filter- 2005  Polymyalgia rheumatica    Spondylisthesis    Swollen ankles    Thalassemia, beta

## 2020-11-14 NOTE — H&P ADULT - NSHPOUTPATIENTPROVIDERS_GEN_ALL_CORE
Dr Hugo Rojas Saucerization Excision Additional Text (Leave Blank If You Do Not Want): The margin was drawn around the clinically apparent lesion.  Incisions were then made along these lines, in a tangential fashion, to the appropriate tissue plane and the lesion was extirpated.

## 2020-11-14 NOTE — H&P ADULT - NSICDXPASTMEDICALHX_GEN_ALL_CORE_FT
PAST MEDICAL HISTORY:  Asthma well controlled, no recent hospitalizations    Flat feet, bilateral     [place] filter in place 2005    History of DVT (deep vein thrombosis) 2005, 2014, 2015    History of pulmonary embolism 2004    HTN (hypertension)     Lung nodule     Neuropathy right foot    Obesity, morbid, BMI 40.0-49.9     Osteoarthritis     Osteoarthritis of right knee     PE (pulmonary embolism) s/p [place] Filter- 2005    Polymyalgia rheumatica     Spondylisthesis     Swollen ankles     Thalassemia, beta

## 2020-11-14 NOTE — ED PROVIDER NOTE - PHYSICAL EXAMINATION
T(C): 36.4 (11-14-20 @ 10:40), Max: 36.4 (11-14-20 @ 10:40)  HR: 66 (11-14-20 @ 10:40) (66 - 66)  BP: 103/60 (11-14-20 @ 10:40) (103/60 - 103/60)  RR: 17 (11-14-20 @ 10:40) (17 - 17)  SpO2: 100% (11-14-20 @ 10:40) (100% - 100%)  Wt(kg): --  GENERAL: NAD, well-developed  HEAD:  Atraumatic, Normocephalic  EYES: EOMI, PERRLA, conjunctiva and sclera clear  NECK: Supple, No JVD  CHEST/LUNG: Clear to auscultation bilaterally; No wheeze  HEART: Regular rate and rhythm; No murmurs, rubs, or gallops  ABDOMEN: Soft, Nontender, Nondistended; Bowel sounds present  EXTREMITIES:  2+ Peripheral Pulses, No clubbing, cyanosis, or edema  PSYCH: AAOx3  NEUROLOGY: non-focal  SKIN: Rash and erythema on L calf -- tender on touch.

## 2020-11-14 NOTE — ED PROVIDER NOTE - OBJECTIVE STATEMENT
75 yo F with pmh of asthma, OA, DVT, PE and L leg lymphedema presented with rash on L leg.   Patient stated that she had recurrent DVT and PE, currently on Coumadin. L leg lymphedema started in June, also had a rash back then but it resolved with compression stocking. Yesterday, she has sudden rash on L leg, which was painful on touch and pain on walking. Patient denied recent travel, chest pain, SOB, cough, abdominal pain, hematuria, blood in stool.   Last coumadin check was 1.92 two weeks prior to admission.

## 2020-11-14 NOTE — H&P ADULT - ATTENDING COMMENTS
discussed with patient in detail, expresses understanding of treatment plans.  discussed with daughter at bedside in detail

## 2020-11-14 NOTE — ED PROVIDER NOTE - CLINICAL SUMMARY MEDICAL DECISION MAKING FREE TEXT BOX
75 yo F with pmh of asthma, OA, DVT, PE and L leg lymphedema presented with rash on L leg.   Rash is likely cellulitis given tender on touch and erythema.   -CBC, CMP  -PT/ PTT/INR  -Doppler of leg  -Ancef  -Tylenol  -urinalysis, urine culture, blood culture

## 2020-11-14 NOTE — ED PROVIDER NOTE - NS ED ROS FT

## 2020-11-15 LAB
ANION GAP SERPL CALC-SCNC: 12 MMO/L — SIGNIFICANT CHANGE UP (ref 7–14)
BUN SERPL-MCNC: 17 MG/DL — SIGNIFICANT CHANGE UP (ref 7–23)
CALCIUM SERPL-MCNC: 9.3 MG/DL — SIGNIFICANT CHANGE UP (ref 8.4–10.5)
CHLORIDE SERPL-SCNC: 94 MMOL/L — LOW (ref 98–107)
CO2 SERPL-SCNC: 27 MMOL/L — SIGNIFICANT CHANGE UP (ref 22–31)
CREAT SERPL-MCNC: 0.72 MG/DL — SIGNIFICANT CHANGE UP (ref 0.5–1.3)
CULTURE RESULTS: SIGNIFICANT CHANGE UP
GLUCOSE SERPL-MCNC: 85 MG/DL — SIGNIFICANT CHANGE UP (ref 70–99)
HCT VFR BLD CALC: 35 % — SIGNIFICANT CHANGE UP (ref 34.5–45)
HCV AB S/CO SERPL IA: 0.15 S/CO — SIGNIFICANT CHANGE UP (ref 0–0.99)
HCV AB SERPL-IMP: SIGNIFICANT CHANGE UP
HGB BLD-MCNC: 11.1 G/DL — LOW (ref 11.5–15.5)
INR BLD: 2.73 — HIGH (ref 0.88–1.16)
MCHC RBC-ENTMCNC: 22.1 PG — LOW (ref 27–34)
MCHC RBC-ENTMCNC: 31.7 % — LOW (ref 32–36)
MCV RBC AUTO: 69.6 FL — LOW (ref 80–100)
NRBC # FLD: 0 K/UL — SIGNIFICANT CHANGE UP (ref 0–0)
PLATELET # BLD AUTO: 299 K/UL — SIGNIFICANT CHANGE UP (ref 150–400)
PMV BLD: 9.5 FL — SIGNIFICANT CHANGE UP (ref 7–13)
POTASSIUM SERPL-MCNC: 3.5 MMOL/L — SIGNIFICANT CHANGE UP (ref 3.5–5.3)
POTASSIUM SERPL-SCNC: 3.5 MMOL/L — SIGNIFICANT CHANGE UP (ref 3.5–5.3)
PROTHROM AB SERPL-ACNC: 29.9 SEC — HIGH (ref 10.6–13.6)
RBC # BLD: 5.03 M/UL — SIGNIFICANT CHANGE UP (ref 3.8–5.2)
RBC # FLD: 16.5 % — HIGH (ref 10.3–14.5)
SODIUM SERPL-SCNC: 133 MMOL/L — LOW (ref 135–145)
SPECIMEN SOURCE: SIGNIFICANT CHANGE UP
TSH SERPL-MCNC: 2.49 UIU/ML — SIGNIFICANT CHANGE UP (ref 0.27–4.2)
WBC # BLD: 7.9 K/UL — SIGNIFICANT CHANGE UP (ref 3.8–10.5)
WBC # FLD AUTO: 7.9 K/UL — SIGNIFICANT CHANGE UP (ref 3.8–10.5)

## 2020-11-15 RX ORDER — BUDESONIDE AND FORMOTEROL FUMARATE DIHYDRATE 160; 4.5 UG/1; UG/1
2 AEROSOL RESPIRATORY (INHALATION)
Refills: 0 | Status: DISCONTINUED | OUTPATIENT
Start: 2020-11-15 | End: 2020-11-16

## 2020-11-15 RX ORDER — WARFARIN SODIUM 2.5 MG/1
7.5 TABLET ORAL ONCE
Refills: 0 | Status: COMPLETED | OUTPATIENT
Start: 2020-11-15 | End: 2020-11-15

## 2020-11-15 RX ORDER — WARFARIN SODIUM 2.5 MG/1
5 TABLET ORAL ONCE
Refills: 0 | Status: DISCONTINUED | OUTPATIENT
Start: 2020-11-15 | End: 2020-11-15

## 2020-11-15 RX ADMIN — LORATADINE 10 MILLIGRAM(S): 10 TABLET ORAL at 13:09

## 2020-11-15 RX ADMIN — Medication 100 MILLIGRAM(S): at 06:27

## 2020-11-15 RX ADMIN — Medication 12.5 MILLIGRAM(S): at 06:27

## 2020-11-15 RX ADMIN — AMLODIPINE BESYLATE 5 MILLIGRAM(S): 2.5 TABLET ORAL at 06:27

## 2020-11-15 RX ADMIN — Medication 25 MILLIGRAM(S): at 18:54

## 2020-11-15 RX ADMIN — Medication 25 MILLIGRAM(S): at 06:27

## 2020-11-15 RX ADMIN — Medication 100 MILLIGRAM(S): at 22:10

## 2020-11-15 RX ADMIN — Medication 150 MILLIGRAM(S): at 06:26

## 2020-11-15 RX ADMIN — Medication 100 MILLIGRAM(S): at 13:09

## 2020-11-15 RX ADMIN — WARFARIN SODIUM 7.5 MILLIGRAM(S): 2.5 TABLET ORAL at 18:55

## 2020-11-15 RX ADMIN — Medication 150 MILLIGRAM(S): at 18:55

## 2020-11-15 RX ADMIN — BUDESONIDE AND FORMOTEROL FUMARATE DIHYDRATE 2 PUFF(S): 160; 4.5 AEROSOL RESPIRATORY (INHALATION) at 22:10

## 2020-11-15 RX ADMIN — Medication 2 SPRAY(S): at 13:38

## 2020-11-15 NOTE — PHYSICAL THERAPY INITIAL EVALUATION ADULT - CRITERIA FOR SKILLED THERAPEUTIC INTERVENTIONS
functional limitations in following categories/predicted duration of therapy intervention/impairments found/anticipated equipment needs at discharge/risk reduction/prevention/therapy frequency/anticipated discharge recommendation/rehab potential

## 2020-11-15 NOTE — PROGRESS NOTE ADULT - PROBLEM SELECTOR PLAN 1
s/p Ancef in Emergency Department  will continue 1 gram q 8 for now  satisfactory improvement overnight  subjectively improved  as well

## 2020-11-15 NOTE — PROGRESS NOTE ADULT - SUBJECTIVE AND OBJECTIVE BOX
Patient is a 74y old  Female who presents with a chief complaint of left leg swelling (2020 14:48)      SUBJECTIVE / OVERNIGHT EVENTS: overnight events noted    ROS:  Resp: No cough no sputum production  CVS: No chest pain no palpitations no orthopnea  GI: no N/V/D  : no dysuria, no hematuria  Neuro: no weakness no paresthesias  Heme: No petechiae no easy bruising  Msk: No joint pain no swelling  Skin: No rash no itching        MEDICATIONS  (STANDING):  amLODIPine   Tablet 5 milliGRAM(s) Oral daily  ceFAZolin   IVPB 1000 milliGRAM(s) IV Intermittent every 8 hours  fluticasone propionate 50 MICROgram(s)/spray Nasal Spray 2 Spray(s) Both Nostrils <User Schedule>  hydrochlorothiazide 12.5 milliGRAM(s) Oral daily  loratadine 10 milliGRAM(s) Oral daily  metoprolol tartrate 25 milliGRAM(s) Oral two times a day  pregabalin 150 milliGRAM(s) Oral two times a day  warfarin 7.5 milliGRAM(s) Oral once    MEDICATIONS  (PRN):  ALBUTerol    90 MICROgram(s) HFA Inhaler 2 Puff(s) Inhalation every 6 hours PRN Shortness of Breath and/or Wheezing        CAPILLARY BLOOD GLUCOSE        I&O's Summary    2020 07:01  -  15 Nov 2020 07:00  --------------------------------------------------------  IN: 0 mL / OUT: 500 mL / NET: -500 mL        Vital Signs Last 24 Hrs  T(C): 36.9 (15 Nov 2020 13:07), Max: 36.9 (2020 21:48)  T(F): 98.4 (15 Nov 2020 13:07), Max: 98.4 (2020 21:48)  HR: 69 (15 Nov 2020 13:07) (63 - 69)  BP: 115/66 (15 Nov 2020 13:07) (115/66 - 149/60)  BP(mean): --  RR: 18 (15 Nov 2020 13:07) (17 - 18)  SpO2: 100% (15 Nov 2020 13:07) (99% - 100%)    PHYSICAL EXAM:   HEENT: MOHSEN EOMI  Neck: Supple, no JVD  Lungs: no wheeze, no crackles  CVS: S1 S2 no M/R/G  Abdomen: no tenderness, BS present  Neuro: AO x 3 nonfocal  Psych: appropriate affect  Skin: erythema and tenderness much improved   tenderness less  no lymphangitis  Ext: no edema    LABS:                        11.1   7.90  )-----------( 299      ( 15 Nov 2020 05:35 )             35.0     11-15    133<L>  |  94<L>  |  17  ----------------------------<  85  3.5   |  27  |  0.72    Ca    9.3      15 Nov 2020 05:35  Phos  3.4     11-14  Mg     2.1     -14    TPro  7.1  /  Alb  4.0  /  TBili  0.5  /  DBili  x   /  AST  17  /  ALT  12  /  AlkPhos  76  11-14    PT/INR - ( 15 Nov 2020 05:35 )   PT: 29.9 SEC;   INR: 2.73          PTT - ( 2020 12:25 )  PTT:41.1 SEC      Urinalysis Basic - ( 2020 13:15 )    Color: YELLOW / Appearance: CLEAR / S.016 / pH: 7.5  Gluc: NEGATIVE / Ketone: NEGATIVE  / Bili: NEGATIVE / Urobili: NORMAL   Blood: NEGATIVE / Protein: NEGATIVE / Nitrite: NEGATIVE   Leuk Esterase: NEGATIVE / RBC: x / WBC x   Sq Epi: x / Non Sq Epi: x / Bacteria: x          All consultant(s) notes reviewed and care discussed with other providers        Contact Number, Dr Jimenez [phone]

## 2020-11-15 NOTE — PROGRESS NOTE ADULT - PROBLEM SELECTOR PLAN 4
no wheeze  continue home inhalers  her home inhaler is non-formulary will use budesonide inhaler instead while inpatient

## 2020-11-15 NOTE — PHYSICAL THERAPY INITIAL EVALUATION ADULT - PERTINENT HX OF CURRENT PROBLEM, REHAB EVAL
Patient is a 74 year old female admitted to [facility] for LLE cellulitis. PMH listed below

## 2020-11-16 ENCOUNTER — APPOINTMENT (OUTPATIENT)
Dept: RHEUMATOLOGY | Facility: CLINIC | Age: 75
End: 2020-11-16

## 2020-11-16 ENCOUNTER — TRANSCRIPTION ENCOUNTER (OUTPATIENT)
Age: 75
End: 2020-11-16

## 2020-11-16 VITALS
HEART RATE: 68 BPM | TEMPERATURE: 97 F | RESPIRATION RATE: 17 BRPM | DIASTOLIC BLOOD PRESSURE: 67 MMHG | OXYGEN SATURATION: 98 % | SYSTOLIC BLOOD PRESSURE: 113 MMHG

## 2020-11-16 LAB
ANION GAP SERPL CALC-SCNC: 10 MMO/L — SIGNIFICANT CHANGE UP (ref 7–14)
BUN SERPL-MCNC: 18 MG/DL — SIGNIFICANT CHANGE UP (ref 7–23)
CALCIUM SERPL-MCNC: 8.9 MG/DL — SIGNIFICANT CHANGE UP (ref 8.4–10.5)
CHLORIDE SERPL-SCNC: 96 MMOL/L — LOW (ref 98–107)
CO2 SERPL-SCNC: 28 MMOL/L — SIGNIFICANT CHANGE UP (ref 22–31)
CREAT SERPL-MCNC: 0.65 MG/DL — SIGNIFICANT CHANGE UP (ref 0.5–1.3)
GLUCOSE SERPL-MCNC: 109 MG/DL — HIGH (ref 70–99)
HCT VFR BLD CALC: 32.2 % — LOW (ref 34.5–45)
HGB BLD-MCNC: 10.2 G/DL — LOW (ref 11.5–15.5)
INR BLD: 2.62 — HIGH (ref 0.88–1.16)
MAGNESIUM SERPL-MCNC: 2 MG/DL — SIGNIFICANT CHANGE UP (ref 1.6–2.6)
MCHC RBC-ENTMCNC: 22.1 PG — LOW (ref 27–34)
MCHC RBC-ENTMCNC: 31.7 % — LOW (ref 32–36)
MCV RBC AUTO: 69.8 FL — LOW (ref 80–100)
NRBC # FLD: 0 K/UL — SIGNIFICANT CHANGE UP (ref 0–0)
PHOSPHATE SERPL-MCNC: 3.1 MG/DL — SIGNIFICANT CHANGE UP (ref 2.5–4.5)
PLATELET # BLD AUTO: 266 K/UL — SIGNIFICANT CHANGE UP (ref 150–400)
PMV BLD: 9.7 FL — SIGNIFICANT CHANGE UP (ref 7–13)
POTASSIUM SERPL-MCNC: 3.3 MMOL/L — LOW (ref 3.5–5.3)
POTASSIUM SERPL-SCNC: 3.3 MMOL/L — LOW (ref 3.5–5.3)
PROTHROM AB SERPL-ACNC: 28.8 SEC — HIGH (ref 10.6–13.6)
RBC # BLD: 4.61 M/UL — SIGNIFICANT CHANGE UP (ref 3.8–5.2)
RBC # FLD: 16 % — HIGH (ref 10.3–14.5)
SODIUM SERPL-SCNC: 134 MMOL/L — LOW (ref 135–145)
WBC # BLD: 6.84 K/UL — SIGNIFICANT CHANGE UP (ref 3.8–10.5)
WBC # FLD AUTO: 6.84 K/UL — SIGNIFICANT CHANGE UP (ref 3.8–10.5)

## 2020-11-16 RX ORDER — POTASSIUM CHLORIDE 20 MEQ
20 PACKET (EA) ORAL DAILY
Refills: 0 | Status: DISCONTINUED | OUTPATIENT
Start: 2020-11-17 | End: 2020-11-16

## 2020-11-16 RX ORDER — POTASSIUM CHLORIDE 20 MEQ
1 PACKET (EA) ORAL
Qty: 30 | Refills: 0
Start: 2020-11-16 | End: 2020-12-15

## 2020-11-16 RX ORDER — POTASSIUM CHLORIDE 20 MEQ
1 PACKET (EA) ORAL
Qty: 0 | Refills: 0 | DISCHARGE
Start: 2020-11-16

## 2020-11-16 RX ORDER — WARFARIN SODIUM 2.5 MG/1
5 TABLET ORAL ONCE
Refills: 0 | Status: DISCONTINUED | OUTPATIENT
Start: 2020-11-16 | End: 2020-11-16

## 2020-11-16 RX ORDER — CEPHALEXIN 500 MG
1 CAPSULE ORAL
Qty: 0 | Refills: 0 | DISCHARGE
Start: 2020-11-16

## 2020-11-16 RX ORDER — POTASSIUM CHLORIDE 20 MEQ
40 PACKET (EA) ORAL ONCE
Refills: 0 | Status: COMPLETED | OUTPATIENT
Start: 2020-11-16 | End: 2020-11-16

## 2020-11-16 RX ORDER — POTASSIUM CHLORIDE 20 MEQ
20 PACKET (EA) ORAL
Refills: 0 | Status: DISCONTINUED | OUTPATIENT
Start: 2020-11-16 | End: 2020-11-16

## 2020-11-16 RX ORDER — CEPHALEXIN 500 MG
500 CAPSULE ORAL
Refills: 0 | Status: DISCONTINUED | OUTPATIENT
Start: 2020-11-16 | End: 2020-11-16

## 2020-11-16 RX ADMIN — Medication 25 MILLIGRAM(S): at 06:21

## 2020-11-16 RX ADMIN — Medication 100 MILLIGRAM(S): at 06:29

## 2020-11-16 RX ADMIN — Medication 100 MILLIGRAM(S): at 12:37

## 2020-11-16 RX ADMIN — AMLODIPINE BESYLATE 5 MILLIGRAM(S): 2.5 TABLET ORAL at 11:50

## 2020-11-16 RX ADMIN — Medication 12.5 MILLIGRAM(S): at 06:21

## 2020-11-16 RX ADMIN — Medication 150 MILLIGRAM(S): at 06:29

## 2020-11-16 RX ADMIN — BUDESONIDE AND FORMOTEROL FUMARATE DIHYDRATE 2 PUFF(S): 160; 4.5 AEROSOL RESPIRATORY (INHALATION) at 09:12

## 2020-11-16 RX ADMIN — LORATADINE 10 MILLIGRAM(S): 10 TABLET ORAL at 11:50

## 2020-11-16 RX ADMIN — Medication 20 MILLIEQUIVALENT(S): at 09:12

## 2020-11-16 RX ADMIN — Medication 40 MILLIEQUIVALENT(S): at 11:50

## 2020-11-16 RX ADMIN — Medication 2 SPRAY(S): at 09:12

## 2020-11-16 NOTE — PROGRESS NOTE ADULT - PROBLEM SELECTOR PLAN 1
much improved   almost fully resolved   continue po antibiotics  change to po Keflex x 5 more days  2 PM dose of IV Ancef before discharge

## 2020-11-16 NOTE — PROGRESS NOTE ADULT - SUBJECTIVE AND OBJECTIVE BOX
Patient is a 74y old  Female who presents with a chief complaint of left leg swelling (15 Nov 2020 13:18)      SUBJECTIVE / OVERNIGHT EVENTS: overnight events noted    ROS:  Resp: No cough no sputum production  CVS: No chest pain no palpitations no orthopnea  GI: no N/V/D  : no dysuria, no hematuria  Neuro: no weakness no paresthesias  Heme: No petechiae no easy bruising  Msk: No joint pain no swelling  Skin: No rash no itching        MEDICATIONS  (STANDING):  amLODIPine   Tablet 5 milliGRAM(s) Oral daily  budesonide 160 MICROgram(s)/formoterol 4.5 MICROgram(s) Inhaler 2 Puff(s) Inhalation two times a day  ceFAZolin   IVPB 1000 milliGRAM(s) IV Intermittent every 8 hours  cephalexin 500 milliGRAM(s) Oral four times a day  fluticasone propionate 50 MICROgram(s)/spray Nasal Spray 2 Spray(s) Both Nostrils <User Schedule>  hydrochlorothiazide 12.5 milliGRAM(s) Oral daily  loratadine 10 milliGRAM(s) Oral daily  metoprolol tartrate 25 milliGRAM(s) Oral two times a day  potassium chloride    Tablet ER 40 milliEquivalent(s) Oral once  pregabalin 150 milliGRAM(s) Oral two times a day  warfarin 5 milliGRAM(s) Oral once    MEDICATIONS  (PRN):  ALBUTerol    90 MICROgram(s) HFA Inhaler 2 Puff(s) Inhalation every 6 hours PRN Shortness of Breath and/or Wheezing        CAPILLARY BLOOD GLUCOSE        I&O's Summary      Vital Signs Last 24 Hrs  T(C): 36.9 (2020 06:17), Max: 36.9 (15 Nov 2020 13:07)  T(F): 98.4 (2020 06:17), Max: 98.4 (15 Nov 2020 13:07)  HR: 63 (2020 06:17) (63 - 71)  BP: 114/62 (2020 06:17) (114/62 - 145/68)  BP(mean): --  RR: 17 (2020 06:17) (17 - 18)  SpO2: 99% (2020 06:17) (99% - 100%)    PHYSICAL EXAM:   HEENT: MOHSEN EOMI  Neck: Supple, no JVD  Lungs: no wheeze, no crackles  CVS: S1 S2 no M/R/G  Abdomen: no tenderness, BS present  Neuro: AO x 3 nonfocal  Psych: appropriate affect  Skin: erythema and tenderness resolved    much less tenderness  Ext: no edema    LABS:                        10.2   6.84  )-----------( 266      ( 2020 05:20 )             32.2     11-16    134<L>  |  96<L>  |  18  ----------------------------<  109<H>  3.3<L>   |  28  |  0.65    Ca    8.9      2020 05:20  Phos  3.1     11-16  Mg     2.0     11-16    TPro  7.1  /  Alb  4.0  /  TBili  0.5  /  DBili  x   /  AST  17  /  ALT  12  /  AlkPhos  76  11-14    PT/INR - ( 2020 05:20 )   PT: 28.8 SEC;   INR: 2.62          PTT - ( 2020 12:25 )  PTT:41.1 SEC      Urinalysis Basic - ( 2020 13:15 )    Color: YELLOW / Appearance: CLEAR / S.016 / pH: 7.5  Gluc: NEGATIVE / Ketone: NEGATIVE  / Bili: NEGATIVE / Urobili: NORMAL   Blood: NEGATIVE / Protein: NEGATIVE / Nitrite: NEGATIVE   Leuk Esterase: NEGATIVE / RBC: x / WBC x   Sq Epi: x / Non Sq Epi: x / Bacteria: x          All consultant(s) notes reviewed and care discussed with other providers        Contact Number, Dr Jimenez [phone]

## 2020-11-16 NOTE — DISCHARGE NOTE PROVIDER - NSDCMRMEDTOKEN_GEN_ALL_CORE_FT
amLODIPine 5 mg oral tablet: 1 tab(s) orally once a day  Bystolic 5 mg oral tablet: 1 tab(s) orally once a day  cephalexin 500 mg oral capsule: 1 cap(s) orally 4 times a day  chlorthalidone 25 mg oral tablet: 1 tab(s) orally once a day (in the morning with food)  Flonase 50 mcg/inh nasal spray: 2 spray(s) in each nostril once a day (in the morning)  levocetirizine 5 mg oral tablet: 1 tab(s) orally once a day (in the evening)  potassium chloride 20 mEq oral tablet, extended release: 1 tab(s) orally once a day  pregabalin 150 mg oral capsule: 1 cap(s) orally 2 times a day    ISTOP Reference #: [id number]   Quantity of 60 capsules for a 30-day supply dispensed 10/22/2020  Sambucol oral liquid: 1 dose(s) orally once a day  OTC supplement that also contains zinc  Senna 8.6 mg oral tablet: 2 tab(s) orally once a day (at bedtime), As Needed  Ventolin HFA 90 mcg/inh inhalation aerosol: 1 to 2 puff(s) inhaled every 4 to 6 hours, As Needed  warfarin 5 mg oral tablet: 1.5 tab(s) (7.5mg) orally 3 times a week on Monday, Wednesday, and Friday  1 tab(s) (5mg) orally on Tuesday, Thursday, Sautrday and Sunday  Wixela Inhub 250 mcg-50 mcg inhalation powder: 1 puff(s) inhaled 2 times a day

## 2020-11-16 NOTE — DISCHARGE NOTE NURSING/CASE MANAGEMENT/SOCIAL WORK - NSDCFUADDAPPT_GEN_ALL_CORE_FT
If you are in need of a general medicine physician and post-discharge medical follow-up for further care/recommendations you may contact the [facility] for an appointment [phone]

## 2020-11-16 NOTE — DISCHARGE NOTE NURSING/CASE MANAGEMENT/SOCIAL WORK - MODE OF TRANSPORTATION
Ambulatory
Normal vision: sees adequately in most situations; can see medication labels, newsprint
Universal Safety Interventions

## 2020-11-16 NOTE — DISCHARGE NOTE PROVIDER - NSDCFUADDAPPT_GEN_ALL_CORE_FT
If you are in need of a general medicine physician and post-discharge medical follow-up for further care/recommendations you may contact the [facility] for an appointment [phone]

## 2020-11-16 NOTE — DISCHARGE NOTE NURSING/CASE MANAGEMENT/SOCIAL WORK - PATIENT PORTAL LINK FT
You can access the FollowMyHealth Patient Portal offered by [facility] by registering at the following website: [email or web address]. By joining [facility]’s FollowMyHealth portal, you will also be able to view your health information using other applications (apps) compatible with our system.

## 2020-11-16 NOTE — DISCHARGE NOTE PROVIDER - NSDCCPCAREPLAN_GEN_ALL_CORE_FT
PRINCIPAL DISCHARGE DIAGNOSIS  Diagnosis: Cellulitis  Assessment and Plan of Treatment: Please take your antibiotic as prescribed. Please continue to monitor your left leg for warmth, swelling, redness. Your left leg has improved since the time of your admission and treatment with IV antibiotics. Please see Dr. Rojas within 1 week for further evaluation.      SECONDARY DISCHARGE DIAGNOSES  Diagnosis: Asthma  Assessment and Plan of Treatment: Continue your home inhalers.    Diagnosis: Hypokalemia  Assessment and Plan of Treatment: Your potassium level in your blood work was found to be low. Please take oral potassium supplementation as prescribed. Please see your PCP for follow up.    Diagnosis: History of pulmonary embolism  Assessment and Plan of Treatment: Please continue to take your Coumadin as prescibed and see your PCP for INR monitoring. Your INR today (11/16/2020) was 2.62     PRINCIPAL DISCHARGE DIAGNOSIS  Diagnosis: Cellulitis  Assessment and Plan of Treatment: Please take your oral antibiotic, Keflex 500mg four times per day starting on 11/17 AM. Please continue to monitor your left leg for warmth, swelling, redness. Your left leg has improved since the time of your admission and treatment with IV antibiotics. Please see Dr. Rojas within 1 week for further evaluation.      SECONDARY DISCHARGE DIAGNOSES  Diagnosis: Asthma  Assessment and Plan of Treatment: Continue your home inhalers.    Diagnosis: Hypokalemia  Assessment and Plan of Treatment: Your potassium level in your blood work was found to be low. Please take oral potassium supplementation as prescribed. Please see your PCP for follow up.    Diagnosis: History of pulmonary embolism  Assessment and Plan of Treatment: Please continue to take your Coumadin as prescibed and see your PCP for INR monitoring. Your INR today (11/16/2020) was 2.62

## 2020-11-16 NOTE — DISCHARGE NOTE PROVIDER - HOSPITAL COURSE
75 yo F with PMH of asthma, OA, DVT, PE and L leg lymphedema presented with rash on L leg consistent with cellulitis likely streptococcal in view of the rapidity of spread, no reason to particularly suspect MRSA    Left Leg Cellulitis  Was on IV Ancef transitioned to PO Keflex x 5 days for d/c home    HTN   patient on chlorthalidone -- started standing dose potassium 20 qd as pt has been hypokalemic this admission  Bystolic and Amlodipine continued       Hx of pulmonary embolism  INR 2.6 today (11/16)  Pt takes 7.5mg of Coumadin M/W/F & 5mg Tu/Thurs/Sat/Sun  B/L LE US neg for DVT    Asthma.    continue home inhalers: Wixela and Ventolin    Neuropathy.   Lyrica continued     On 11/16/2020 this case was reviewed with Dr. Jimenez, the patient is medically stable and optimized for discharge. All medications were reviewed and prescriptions were sent to mutually agreed upon pharmacy. The patient agrees to follow up with providers as recommended.

## 2020-11-16 NOTE — PROGRESS NOTE ADULT - PROBLEM SELECTOR PLAN 2
continue home meds on discharge  will replete hypokalemia before discharge  start standing dose potassium 20 qd   patient on chlorthalidone and since admission her K has been running low

## 2020-11-16 NOTE — PROGRESS NOTE ADULT - ATTENDING COMMENTS
discharge home  discussed with patient in detail, expresses understanding of treatment plans.  discussed with daughter over the phone in detail   discussed with ACP  med reconciliation done

## 2020-11-16 NOTE — DISCHARGE NOTE PROVIDER - CARE PROVIDER_API CALL
Hugo Rojas  INTERNAL MEDICINE  [address] [facility] [address]  [place] [address]  Phone: [phone]  Fax: [phone]  Follow Up Time: 1 week

## 2020-11-16 NOTE — PROGRESS NOTE ADULT - ASSESSMENT
73 yo F with PMH of asthma, OA, DVT, PE and L leg lymphedema presented with rash on L leg consistent with cellulitis likely streptococcal in view of the rapidity of spread, no reason to particularly suspect MRSA
75 yo F with PMH of asthma, OA, DVT, PE and L leg lymphedema presented with rash on L leg consistent with cellulitis likely streptococcal in view of the rapidity of spread, no reason to particularly suspect MRSA
WDL

## 2020-11-17 RX ORDER — CEPHALEXIN 500 MG
1 CAPSULE ORAL
Qty: 20 | Refills: 0
Start: 2020-11-17 | End: 2020-11-21

## 2020-11-19 LAB
CULTURE RESULTS: SIGNIFICANT CHANGE UP
CULTURE RESULTS: SIGNIFICANT CHANGE UP
SPECIMEN SOURCE: SIGNIFICANT CHANGE UP
SPECIMEN SOURCE: SIGNIFICANT CHANGE UP

## 2020-12-01 ENCOUNTER — APPOINTMENT (OUTPATIENT)
Dept: VASCULAR SURGERY | Facility: CLINIC | Age: 75
End: 2020-12-01
Payer: MEDICARE

## 2020-12-01 VITALS
DIASTOLIC BLOOD PRESSURE: 83 MMHG | SYSTOLIC BLOOD PRESSURE: 145 MMHG | BODY MASS INDEX: 45.64 KG/M2 | TEMPERATURE: 97.7 F | HEART RATE: 64 BPM | WEIGHT: 248 LBS | HEIGHT: 62 IN

## 2020-12-01 PROCEDURE — 99213 OFFICE O/P EST LOW 20 MIN: CPT

## 2020-12-01 PROCEDURE — 99072 ADDL SUPL MATRL&STAF TM PHE: CPT

## 2020-12-01 PROCEDURE — 93970 EXTREMITY STUDY: CPT

## 2020-12-01 NOTE — HISTORY OF PRESENT ILLNESS
[FreeTextEntry1] : A 74 year old woman with known lymphedema of the left leg who was recently discharged from the hospital for cellulitis of the LLE. She has been using compression but not since discharge.  [de-identified] : swelling is still there as well as the hardness and redness, however, it is much improved since discharge.

## 2020-12-01 NOTE — ASSESSMENT
[FreeTextEntry1] : A 74 year old woman with lymphedema. I think she is ready to go back to compression and elevation. We discussed the pathogenesis of cellulitis and the importance of compression therapy to reduce the swelling which might lead to skin infections.

## 2020-12-07 ENCOUNTER — APPOINTMENT (OUTPATIENT)
Dept: RHEUMATOLOGY | Facility: CLINIC | Age: 75
End: 2020-12-07
Payer: MEDICARE

## 2020-12-07 VITALS
BODY MASS INDEX: 44.72 KG/M2 | HEIGHT: 62 IN | SYSTOLIC BLOOD PRESSURE: 154 MMHG | HEART RATE: 68 BPM | WEIGHT: 243 LBS | OXYGEN SATURATION: 98 % | DIASTOLIC BLOOD PRESSURE: 82 MMHG | TEMPERATURE: 97 F

## 2020-12-07 DIAGNOSIS — M25.511 PAIN IN RIGHT SHOULDER: ICD-10-CM

## 2020-12-07 PROCEDURE — 99213 OFFICE O/P EST LOW 20 MIN: CPT

## 2020-12-07 PROCEDURE — 99072 ADDL SUPL MATRL&STAF TM PHE: CPT

## 2020-12-23 ENCOUNTER — RX RENEWAL (OUTPATIENT)
Age: 75
End: 2020-12-23

## 2021-04-05 ENCOUNTER — APPOINTMENT (OUTPATIENT)
Dept: RHEUMATOLOGY | Facility: CLINIC | Age: 76
End: 2021-04-05
Payer: MEDICARE

## 2021-04-05 VITALS
HEART RATE: 72 BPM | DIASTOLIC BLOOD PRESSURE: 91 MMHG | SYSTOLIC BLOOD PRESSURE: 173 MMHG | WEIGHT: 249 LBS | OXYGEN SATURATION: 98 % | BODY MASS INDEX: 45.82 KG/M2 | TEMPERATURE: 97.2 F | HEIGHT: 62 IN

## 2021-04-05 PROCEDURE — 99212 OFFICE O/P EST SF 10 MIN: CPT

## 2021-04-05 PROCEDURE — 99072 ADDL SUPL MATRL&STAF TM PHE: CPT

## 2021-06-09 NOTE — DIETITIAN INITIAL EVALUATION ADULT. - NS AS NUTRI INTERV ED CONTENT
Survival information/Purpose of the nutrition education/Nutrition relationship to health/disease/Recommended modifications/Priority modifications Additional Epidermal Closure (Optional): horizontal mattress

## 2021-06-24 ENCOUNTER — RX RENEWAL (OUTPATIENT)
Age: 76
End: 2021-06-24

## 2021-08-12 ENCOUNTER — APPOINTMENT (OUTPATIENT)
Dept: INTERNAL MEDICINE | Facility: CLINIC | Age: 76
End: 2021-08-12
Payer: MEDICARE

## 2021-08-12 ENCOUNTER — LABORATORY RESULT (OUTPATIENT)
Age: 76
End: 2021-08-12

## 2021-08-12 VITALS
BODY MASS INDEX: 45.27 KG/M2 | HEIGHT: 62 IN | OXYGEN SATURATION: 96 % | DIASTOLIC BLOOD PRESSURE: 66 MMHG | SYSTOLIC BLOOD PRESSURE: 110 MMHG | WEIGHT: 246 LBS | TEMPERATURE: 97.1 F | HEART RATE: 73 BPM

## 2021-08-12 DIAGNOSIS — Z23 ENCOUNTER FOR IMMUNIZATION: ICD-10-CM

## 2021-08-12 DIAGNOSIS — I26.99 OTHER PULMONARY EMBOLISM W/OUT ACUTE COR PULMONALE: ICD-10-CM

## 2021-08-12 DIAGNOSIS — I82.409 ACUTE EMBOLISM AND THROMBOSIS OF UNSPECIFIED DEEP VEINS OF UNSPECIFIED LOWER EXTREMITY: ICD-10-CM

## 2021-08-12 PROCEDURE — 36415 COLL VENOUS BLD VENIPUNCTURE: CPT

## 2021-08-12 PROCEDURE — 99203 OFFICE O/P NEW LOW 30 MIN: CPT | Mod: 25

## 2021-08-12 RX ORDER — FLUTICASONE PROPIONATE AND SALMETEROL 250; 50 UG/1; UG/1
250-50 POWDER RESPIRATORY (INHALATION)
Qty: 1 | Refills: 4 | Status: DISCONTINUED | COMMUNITY
Start: 2020-03-30 | End: 2021-08-12

## 2021-08-12 RX ORDER — LIDOCAINE 5% 700 MG/1
5 PATCH TOPICAL
Qty: 60 | Refills: 3 | Status: DISCONTINUED | COMMUNITY
Start: 2020-12-07 | End: 2021-08-12

## 2021-08-12 RX ORDER — FLUTICASONE PROPIONATE AND SALMETEROL 100; 50 UG/1; UG/1
100-50 POWDER RESPIRATORY (INHALATION)
Qty: 3 | Refills: 3 | Status: DISCONTINUED | COMMUNITY
Start: 2018-06-27 | End: 2021-08-12

## 2021-08-12 RX ORDER — PREGABALIN 150 MG/1
150 CAPSULE ORAL
Qty: 60 | Refills: 0 | Status: DISCONTINUED | COMMUNITY
Start: 2020-06-16 | End: 2021-08-12

## 2021-08-12 RX ORDER — CLOTRIMAZOLE 10 MG/ML
1 SOLUTION TOPICAL TWICE DAILY
Qty: 1 | Refills: 1 | Status: DISCONTINUED | COMMUNITY
Start: 2019-09-26 | End: 2021-08-12

## 2021-08-12 RX ORDER — KETOCONAZOLE 20 MG/G
2 CREAM TOPICAL
Qty: 2 | Refills: 1 | Status: DISCONTINUED | COMMUNITY
Start: 2019-02-26 | End: 2021-08-12

## 2021-08-12 RX ORDER — OSELTAMIVIR PHOSPHATE 75 MG/1
75 CAPSULE ORAL TWICE DAILY
Qty: 1 | Refills: 0 | Status: DISCONTINUED | COMMUNITY
Start: 2020-02-21 | End: 2021-08-12

## 2021-08-12 RX ORDER — FLUTICASONE PROPIONATE AND SALMETEROL 250; 50 UG/1; UG/1
250-50 POWDER RESPIRATORY (INHALATION)
Qty: 1 | Refills: 1 | Status: DISCONTINUED | COMMUNITY
Start: 2019-05-14 | End: 2021-08-12

## 2021-08-16 ENCOUNTER — LABORATORY RESULT (OUTPATIENT)
Age: 76
End: 2021-08-16

## 2021-08-16 DIAGNOSIS — E87.6 HYPOKALEMIA: ICD-10-CM

## 2021-08-16 LAB
25(OH)D3 SERPL-MCNC: 40 NG/ML
ALBUMIN SERPL ELPH-MCNC: 4.4 G/DL
ALP BLD-CCNC: 100 U/L
ALT SERPL-CCNC: 10 U/L
ANION GAP SERPL CALC-SCNC: 11 MMOL/L
AST SERPL-CCNC: 20 U/L
BASOPHILS # BLD AUTO: 0.06 K/UL
BASOPHILS NFR BLD AUTO: 1.3 %
BILIRUB SERPL-MCNC: 0.3 MG/DL
BUN SERPL-MCNC: 14 MG/DL
CALCIUM SERPL-MCNC: 9.4 MG/DL
CHLORIDE SERPL-SCNC: 97 MMOL/L
CHOLEST SERPL-MCNC: 141 MG/DL
CO2 SERPL-SCNC: 27 MMOL/L
CREAT SERPL-MCNC: 0.66 MG/DL
EOSINOPHIL # BLD AUTO: 0.05 K/UL
EOSINOPHIL NFR BLD AUTO: 1.1 %
ESTIMATED AVERAGE GLUCOSE: 120 MG/DL
FERRITIN SERPL-MCNC: 144 NG/ML
GLUCOSE SERPL-MCNC: 99 MG/DL
HBA1C MFR BLD HPLC: 5.8 %
HCT VFR BLD CALC: 33.8 %
HDLC SERPL-MCNC: 68 MG/DL
HGB BLD-MCNC: 11.3 G/DL
IMM GRANULOCYTES NFR BLD AUTO: 0.2 %
INR PPP: 2.24 RATIO
IRON SATN MFR SERPL: 19 %
IRON SERPL-MCNC: 62 UG/DL
LDLC SERPL CALC-MCNC: 59 MG/DL
LYMPHOCYTES # BLD AUTO: 1.39 K/UL
LYMPHOCYTES NFR BLD AUTO: 30.3 %
MAN DIFF?: NORMAL
MCHC RBC-ENTMCNC: 22.4 PG
MCHC RBC-ENTMCNC: 33.4 GM/DL
MCV RBC AUTO: 67.1 FL
MONOCYTES # BLD AUTO: 0.54 K/UL
MONOCYTES NFR BLD AUTO: 11.8 %
NEUTROPHILS # BLD AUTO: 2.54 K/UL
NEUTROPHILS NFR BLD AUTO: 55.3 %
NONHDLC SERPL-MCNC: 73 MG/DL
PLATELET # BLD AUTO: 338 K/UL
POTASSIUM SERPL-SCNC: 3.4 MMOL/L
PROT SERPL-MCNC: 6.7 G/DL
PT BLD: 25.4 SEC
RBC # BLD: 5.04 M/UL
RBC # FLD: 16.6 %
SODIUM SERPL-SCNC: 135 MMOL/L
TIBC SERPL-MCNC: 321 UG/DL
TRIGL SERPL-MCNC: 71 MG/DL
TSH SERPL-ACNC: 1.1 UIU/ML
UIBC SERPL-MCNC: 259 UG/DL
WBC # FLD AUTO: 4.59 K/UL

## 2021-08-18 DIAGNOSIS — R06.89 DYSPNEA, UNSPECIFIED: ICD-10-CM

## 2021-08-18 DIAGNOSIS — R06.00 DYSPNEA, UNSPECIFIED: ICD-10-CM

## 2021-08-25 ENCOUNTER — LABORATORY RESULT (OUTPATIENT)
Age: 76
End: 2021-08-25

## 2021-08-26 ENCOUNTER — APPOINTMENT (OUTPATIENT)
Dept: PULMONOLOGY | Facility: CLINIC | Age: 76
End: 2021-08-26
Payer: MEDICARE

## 2021-08-26 VITALS
WEIGHT: 247 LBS | HEART RATE: 60 BPM | HEIGHT: 62 IN | BODY MASS INDEX: 45.45 KG/M2 | DIASTOLIC BLOOD PRESSURE: 85 MMHG | TEMPERATURE: 97.8 F | SYSTOLIC BLOOD PRESSURE: 175 MMHG | RESPIRATION RATE: 14 BRPM | OXYGEN SATURATION: 96 %

## 2021-08-26 PROCEDURE — ZZZZZ: CPT

## 2021-08-26 PROCEDURE — 94726 PLETHYSMOGRAPHY LUNG VOLUMES: CPT

## 2021-08-26 PROCEDURE — 99203 OFFICE O/P NEW LOW 30 MIN: CPT | Mod: 25

## 2021-08-26 PROCEDURE — 94729 DIFFUSING CAPACITY: CPT

## 2021-08-26 PROCEDURE — 94010 BREATHING CAPACITY TEST: CPT

## 2021-08-28 LAB — SARS-COV-2 N GENE NPH QL NAA+PROBE: NOT DETECTED

## 2021-09-24 NOTE — CONSULT LETTER
[Dear  ___] : Dear  [unfilled], [Consult Letter:] : I had the pleasure of evaluating your patient, [unfilled]. [Please see my note below.] : Please see my note below. [Sincerely,] : Sincerely, [Consult Closing:] : Thank you very much for allowing me to participate in the care of this patient.  If you have any questions, please do not hesitate to contact me. [FreeTextEntry2] : Dr. Masha Cruz [FreeTextEntry3] : Mayela Billings MD\par  of Medicine\par Associate , Fellowship in Pulmonary and Critical Care Medicine\par Division of Pulmonary, Critical Care & Sleep Medicine\par [employer] [facility]\par \par \par

## 2021-09-24 NOTE — PHYSICAL EXAM
[No Acute Distress] : no acute distress [Low Lying Soft Palate] : low lying soft palate [II] : Mallampati Class: II [Normal Appearance] : normal appearance [No Neck Mass] : no neck mass [Normal Rate/Rhythm] : normal rate/rhythm [Normal S1, S2] : normal s1, s2 [No Murmurs] : no murmurs [No Resp Distress] : no resp distress [Clear to Auscultation Bilaterally] : clear to auscultation bilaterally [No Abnormalities] : no abnormalities [Benign] : benign [Normal Gait] : normal gait [No Clubbing] : no clubbing [No Cyanosis] : no cyanosis [No Edema] : no edema [FROM] : FROM [Normal Color/ Pigmentation] : normal color/ pigmentation [No Focal Deficits] : no focal deficits [Oriented x3] : oriented x3 [Normal Affect] : normal affect

## 2021-09-24 NOTE — REVIEW OF SYSTEMS
[Arthralgias] : arthralgias [Chronic Pain] : chronic pain [Obesity] : obesity [Negative] : Psychiatric [TextBox_30] : per hpi [TextBox_44] : per hpi

## 2021-09-24 NOTE — HISTORY OF PRESENT ILLNESS
[Never] : never [TextBox_4] : 76 yo F with a h/o Asthma presents for initial evaluation and to establish care.\par Has been following with Dr. 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 Taylor Flanagan\par Current inhalers: Wixela 250/50 BID for at least 1+years, Albuterol \par In the past has taken Advair as well.\par Symptoms have been controlled, using Albuterol infrequently. Feels dyspneic on exertion on hot/humid days\par Triggers: Humidity, Perfumes, tobacco smoke \par Last steroid use: Prednisone approx 2 years ago\par \par PFTs today: normal spirometry, lung volumes and DLCO (borderline restriction/reduced DLCO - likely from obesity)\par \par CT chest: 10/3/2019\par INTERPRETATION: CLINICAL INFORMATION: Pulmonary nodule follow-up \par COMPARISON: CT chest from 6/11/2019 \par LUNGS AND AIRWAYS: Patent central airways. Unchanged pulmonary nodules compared to prior CT chest from 6/11/2019.\par Two right middle lobe adjacent nodules measuring 6 and 4 mm with adjacent groundglass \par opacity. Other lung nodules measure up to 7 mm are unchanged. \par \par PLEURA: No pleural effusion. \par \par MEDIASTINUM AND CAILIN: No lymphadenopathy. \par \par VESSELS: Within normal limits. \par \par HEART: Heart size is normal. No pericardial effusion. \par \par CHEST WALL AND LOWER NECK: Within normal limits. \par \par VISUALIZED UPPER ABDOMEN: Patchy hypodensities within the liver unchanged \par compared to prior CT chest from 6/11/2019. \par \par BONES: Degenerative changes \par \par Quality metrics:\par Tobacco use - never\par ESS - NA\par \par Vaccines: \par COVID: yes completed in April 2021\par Influenza: receives annually \par Pneumococcal: received 5/2016\par \par

## 2021-09-24 NOTE — ASSESSMENT
[FreeTextEntry1] : 1. Asthma - well controlled, c/w Wixela and Albuterol prn- Rx sent\par trigger avoidance\par \par 2. Lung nodules - in a nonsmoker, have been stable from 9460-2651, likely post infectious\par WIll repeat CT prior to next visit. \par \par 3. Obesity - counseled on weight management\par \par Follow up in 3 months

## 2021-09-26 ENCOUNTER — NON-APPOINTMENT (OUTPATIENT)
Age: 76
End: 2021-09-26

## 2021-09-27 DIAGNOSIS — E87.1 HYPO-OSMOLALITY AND HYPONATREMIA: ICD-10-CM

## 2021-09-27 LAB
ANION GAP SERPL CALC-SCNC: 14 MMOL/L
BUN SERPL-MCNC: 11 MG/DL
CALCIUM SERPL-MCNC: 9.3 MG/DL
CHLORIDE SERPL-SCNC: 89 MMOL/L
CO2 SERPL-SCNC: 27 MMOL/L
CREAT SERPL-MCNC: 0.75 MG/DL
GLUCOSE SERPL-MCNC: 81 MG/DL
INR PPP: 1.83 RATIO
POTASSIUM SERPL-SCNC: 4.2 MMOL/L
PT BLD: 21 SEC
SODIUM SERPL-SCNC: 129 MMOL/L

## 2021-09-30 LAB
ANION GAP SERPL CALC-SCNC: 11 MMOL/L
BUN SERPL-MCNC: 14 MG/DL
CALCIUM SERPL-MCNC: 9.2 MG/DL
CHLORIDE SERPL-SCNC: 90 MMOL/L
CO2 SERPL-SCNC: 27 MMOL/L
CREAT SERPL-MCNC: 0.69 MG/DL
GLUCOSE SERPL-MCNC: 89 MG/DL
INR PPP: 2.7 RATIO
POTASSIUM SERPL-SCNC: 4.2 MMOL/L
PT BLD: 30.6 SEC
SODIUM SERPL-SCNC: 128 MMOL/L

## 2021-09-30 RX ORDER — CHLORTHALIDONE 25 MG/1
25 TABLET ORAL
Qty: 90 | Refills: 0 | Status: DISCONTINUED | COMMUNITY
Start: 2018-05-01 | End: 2021-09-30

## 2021-10-05 LAB
ANION GAP SERPL CALC-SCNC: 14 MMOL/L
BUN SERPL-MCNC: 14 MG/DL
CALCIUM SERPL-MCNC: 9.5 MG/DL
CHLORIDE SERPL-SCNC: 93 MMOL/L
CO2 SERPL-SCNC: 27 MMOL/L
CREAT SERPL-MCNC: 0.67 MG/DL
GLUCOSE SERPL-MCNC: 96 MG/DL
INR PPP: 2.47 RATIO
POTASSIUM SERPL-SCNC: 3.6 MMOL/L
PT BLD: 28.2 SEC
SODIUM SERPL-SCNC: 134 MMOL/L

## 2021-10-11 ENCOUNTER — APPOINTMENT (OUTPATIENT)
Dept: RHEUMATOLOGY | Facility: CLINIC | Age: 76
End: 2021-10-11
Payer: MEDICARE

## 2021-10-11 VITALS
HEIGHT: 62 IN | BODY MASS INDEX: 43.79 KG/M2 | TEMPERATURE: 96.5 F | OXYGEN SATURATION: 97 % | SYSTOLIC BLOOD PRESSURE: 160 MMHG | WEIGHT: 238 LBS | HEART RATE: 67 BPM | DIASTOLIC BLOOD PRESSURE: 85 MMHG

## 2021-10-11 DIAGNOSIS — M79.603 PAIN IN ARM, UNSPECIFIED: ICD-10-CM

## 2021-10-11 PROCEDURE — 99213 OFFICE O/P EST LOW 20 MIN: CPT

## 2021-10-11 RX ORDER — WARFARIN 2.5 MG/1
2.5 TABLET ORAL
Qty: 60 | Refills: 0 | Status: DISCONTINUED | COMMUNITY
Start: 2018-08-30 | End: 2021-10-11

## 2021-10-13 ENCOUNTER — LABORATORY RESULT (OUTPATIENT)
Age: 76
End: 2021-10-13

## 2021-10-15 ENCOUNTER — NON-APPOINTMENT (OUTPATIENT)
Age: 76
End: 2021-10-15

## 2021-10-16 ENCOUNTER — EMERGENCY (EMERGENCY)
Facility: HOSPITAL | Age: 76
LOS: 1 days | Discharge: HOME | End: 2021-10-16
Attending: EMERGENCY MEDICINE | Admitting: EMERGENCY MEDICINE
Payer: MEDICARE

## 2021-10-16 VITALS
DIASTOLIC BLOOD PRESSURE: 82 MMHG | HEIGHT: 62 IN | HEART RATE: 60 BPM | OXYGEN SATURATION: 100 % | TEMPERATURE: 98 F | SYSTOLIC BLOOD PRESSURE: 155 MMHG | RESPIRATION RATE: 18 BRPM

## 2021-10-16 DIAGNOSIS — Z96.651 PRESENCE OF RIGHT ARTIFICIAL KNEE JOINT: Chronic | ICD-10-CM

## 2021-10-16 DIAGNOSIS — Z90.49 ACQUIRED ABSENCE OF OTHER SPECIFIED PARTS OF DIGESTIVE TRACT: Chronic | ICD-10-CM

## 2021-10-16 DIAGNOSIS — Z98.89 OTHER SPECIFIED POSTPROCEDURAL STATES: Chronic | ICD-10-CM

## 2021-10-16 DIAGNOSIS — I83.93 ASYMPTOMATIC VARICOSE VEINS OF BILATERAL LOWER EXTREMITIES: Chronic | ICD-10-CM

## 2021-10-16 DIAGNOSIS — Z98.890 OTHER SPECIFIED POSTPROCEDURAL STATES: Chronic | ICD-10-CM

## 2021-10-16 DIAGNOSIS — Z98.1 ARTHRODESIS STATUS: Chronic | ICD-10-CM

## 2021-10-16 LAB
ALBUMIN SERPL ELPH-MCNC: 4.3 G/DL — SIGNIFICANT CHANGE UP (ref 3.3–5)
ALP SERPL-CCNC: 74 U/L — SIGNIFICANT CHANGE UP (ref 40–120)
ALT FLD-CCNC: 16 U/L — SIGNIFICANT CHANGE UP (ref 4–33)
ANION GAP SERPL CALC-SCNC: 10 MMOL/L — SIGNIFICANT CHANGE UP (ref 7–14)
APPEARANCE UR: CLEAR — SIGNIFICANT CHANGE UP
AST SERPL-CCNC: 38 U/L — HIGH (ref 4–32)
BILIRUB SERPL-MCNC: 0.2 MG/DL — SIGNIFICANT CHANGE UP (ref 0.2–1.2)
BILIRUB UR-MCNC: NEGATIVE — SIGNIFICANT CHANGE UP
BLOOD GAS VENOUS COMPREHENSIVE RESULT: SIGNIFICANT CHANGE UP
BUN SERPL-MCNC: 16 MG/DL — SIGNIFICANT CHANGE UP (ref 7–23)
CALCIUM SERPL-MCNC: 9.6 MG/DL — SIGNIFICANT CHANGE UP (ref 8.4–10.5)
CHLORIDE SERPL-SCNC: 99 MMOL/L — SIGNIFICANT CHANGE UP (ref 98–107)
CO2 SERPL-SCNC: 24 MMOL/L — SIGNIFICANT CHANGE UP (ref 22–31)
COLOR SPEC: COLORLESS — SIGNIFICANT CHANGE UP
CREAT SERPL-MCNC: 0.72 MG/DL — SIGNIFICANT CHANGE UP (ref 0.5–1.3)
DIFF PNL FLD: NEGATIVE — SIGNIFICANT CHANGE UP
GLUCOSE SERPL-MCNC: 82 MG/DL — SIGNIFICANT CHANGE UP (ref 70–99)
GLUCOSE UR QL: NEGATIVE — SIGNIFICANT CHANGE UP
HCT VFR BLD CALC: 35.4 % — SIGNIFICANT CHANGE UP (ref 34.5–45)
HGB BLD-MCNC: 11.4 G/DL — LOW (ref 11.5–15.5)
IANC: 3.04 K/UL — SIGNIFICANT CHANGE UP (ref 1.5–8.5)
KETONES UR-MCNC: NEGATIVE — SIGNIFICANT CHANGE UP
LEUKOCYTE ESTERASE UR-ACNC: NEGATIVE — SIGNIFICANT CHANGE UP
MCHC RBC-ENTMCNC: 22.5 PG — LOW (ref 27–34)
MCHC RBC-ENTMCNC: 32.2 GM/DL — SIGNIFICANT CHANGE UP (ref 32–36)
MCV RBC AUTO: 69.8 FL — LOW (ref 80–100)
NITRITE UR-MCNC: NEGATIVE — SIGNIFICANT CHANGE UP
PH UR: 7 — SIGNIFICANT CHANGE UP (ref 5–8)
PLATELET # BLD AUTO: 309 K/UL — SIGNIFICANT CHANGE UP (ref 150–400)
POTASSIUM SERPL-MCNC: 6.1 MMOL/L — HIGH (ref 3.5–5.3)
POTASSIUM SERPL-SCNC: 6.1 MMOL/L — HIGH (ref 3.5–5.3)
PROT SERPL-MCNC: 7.5 G/DL — SIGNIFICANT CHANGE UP (ref 6–8.3)
PROT UR-MCNC: NEGATIVE — SIGNIFICANT CHANGE UP
RBC # BLD: 5.07 M/UL — SIGNIFICANT CHANGE UP (ref 3.8–5.2)
RBC # FLD: 17.4 % — HIGH (ref 10.3–14.5)
SODIUM SERPL-SCNC: 133 MMOL/L — LOW (ref 135–145)
SP GR SPEC: 1.01 — SIGNIFICANT CHANGE UP (ref 1–1.05)
UROBILINOGEN FLD QL: SIGNIFICANT CHANGE UP
WBC # BLD: 5.8 K/UL — SIGNIFICANT CHANGE UP (ref 3.8–10.5)
WBC # FLD AUTO: 5.8 K/UL — SIGNIFICANT CHANGE UP (ref 3.8–10.5)

## 2021-10-16 PROCEDURE — 99285 EMERGENCY DEPT VISIT HI MDM: CPT

## 2021-10-16 NOTE — ED PROVIDER NOTE - PATIENT PORTAL LINK FT
You can access the FollowMyHealth Patient Portal offered by [facility] by registering at the following website: [email or web address]. By joining [employer]’s FollowMyHealth portal, you will also be able to view your health information using other applications (apps) compatible with our system.

## 2021-10-16 NOTE — ED PROVIDER NOTE - NSFOLLOWUPINSTRUCTIONS_ED_ALL_ED_FT
- Lab and imaging results, if performed, were discussed with you along with your discharge diagnosis    - Follow up with your doctor in 1 week - bring copies of your results with you     - Return to the ED for any new, worsening, or concerning symptoms to you including worsening pain, new numbness/tingling or severe pain     - Continue all prescribed medications    - Take ibuprofen/tylenol as directed as needed for pain  To control your pain at home, you should take Ibuprofen 400 mg along with Tylenol 650mg-1000mg every 6 to 8 hours. Limit your maximum daily Tylenol from all sources to 4000mg. Be aware that many other medications contain acetaminophen which is also known as Tylenol. Taking Tylenol and Ibuprofen together has been shown to be more effective at relieving pain than taking them separately. These are both over the counter medications that you can  at your local pharmacy without a prescription. You need to respect all of the warnings on the bottles. You shouldn’t take these medications for more than a week without following up with your doctor. Both medications come with certain risks and side effects that you need to discuss with your doctor, especially if you are taking them for a prolonged period.    - Rest and keep yourself hydrated with fluids

## 2021-10-16 NOTE — ED PROVIDER NOTE - PHYSICAL EXAMINATION
Physical Exam:  Gen: NAD, AOx3, non-toxic appearing  Lung: CTAB, no respiratory distress, no wheezes/rhonchi/rales B/L, speaking in full sentences  CV: RRR, no murmurs, rubs or gallops  Abd: soft, +mild R low flank tenderness to palpation, ND, no guarding, no rigidity, no rebound tenderness, no CVA tenderness   MSK: no visible deformities  Neuro: No focal sensory or motor deficits  Skin: Warm, well perfused, no rash, +B/L LE swelling (chronic as per patient)   Psych: normal affect, calm  Katie Frank D.O. -Resident

## 2021-10-16 NOTE — ED PROVIDER NOTE - PROGRESS NOTE DETAILS
Monika TABOR (PGY-3):  Discussed strict return precautions and follow-up instructions. Patient is agreeable with plan, addressed all questions and concerns at this time. Patient endorsed understanding of follow-up instructions.

## 2021-10-16 NOTE — ED PROVIDER NOTE - NSICDXPASTSURGICALHX_GEN_ALL_CORE_FT
PAST SURGICAL HISTORY:  Campbell filter in place 2005    H/O eye surgery left retinal tear, 2012    History of ankle fusion right, 2017    History of appendectomy     History of appendectomy 2015    History of hernia surgery 2013    History of total right knee replacement     S/P hip replacement bilateral, 2008, 2009    S/P hysterectomy 1999    Varicose vein of leg cauterization left leg vein 1989

## 2021-10-16 NOTE — ED PROVIDER NOTE - NS ED ROS FT
CONSTITUTIONAL: No fevers, no chills, no lightheadedness, no dizziness  CV: No chest pain, no palpitations  RESP: No SOB, no cough  GI: No n/v/d, no abd pain  : no dysuria, no hematuria, +R flank pain  MSK: +R-sided back pain, no extremity pain  SKIN: no rashes  NEURO: no headache  PSYCHIATRIC: no known mental health issues

## 2021-10-16 NOTE — ED ADULT TRIAGE NOTE - CHIEF COMPLAINT QUOTE
pt c/o intermittent R lower back pain that radiates to R groin x4 days. Endorses polyuria, denies painful urination or hematuria.

## 2021-10-16 NOTE — ED PROVIDER NOTE - NSICDXPASTMEDICALHX_GEN_ALL_CORE_FT
PAST MEDICAL HISTORY:  Asthma well controlled, no recent hospitalizations    Flat feet, bilateral     [place] filter in place 2005    History of DVT (deep vein thrombosis) 2005, 2014, 2015    History of pulmonary embolism 2004    HTN (hypertension)     Lung nodule     Neuropathy right foot    Obesity, morbid, BMI 40.0-49.9     Osteoarthritis     Osteoarthritis of right knee     PE (pulmonary embolism) s/p Donta Filter- 2005    Polymyalgia rheumatica     Spondylisthesis     Swollen ankles     Thalassemia, beta

## 2021-10-16 NOTE — ED PROVIDER NOTE - OBJECTIVE STATEMENT
75y F w/ PMH of asthma, OA, DVT, PE and L leg lymphedema presenting with R-sided low back pain radiating to RLQ x4 days. Intermittent in nature with no known exacerbating of alleviating factors. States pain started suddenly, denies previous hx of similar symptoms in past. Denies recent trauma prior to pain onset. Pain does not radiate down leg, no associated numbness/tingling, not worsened with exertion. No known exacerbating factors. Has taken her Lyrica at home for pain, which she states helped alleviate it, last took

## 2021-10-16 NOTE — ED ADULT NURSE NOTE - NSIMPLEMENTINTERV_GEN_ALL_ED
Implemented All Fall Risk Interventions:  [place] to call system. Call bell, personal items and telephone within reach. Instruct patient to call for assistance. Room bathroom lighting operational. Non-slip footwear when patient is off stretcher. Physically safe environment: no spills, clutter or unnecessary equipment. Stretcher in lowest position, wheels locked, appropriate side rails in place. Provide visual cue, wrist band, yellow gown, etc. Monitor gait and stability. Monitor for mental status changes and reorient to person, place, and time. Review medications for side effects contributing to fall risk. Reinforce activity limits and safety measures with patient and family.

## 2021-10-16 NOTE — ED PROVIDER NOTE - ATTENDING CONTRIBUTION TO CARE
Attending note:   After face to face evaluation of this patient, I concur with above noted hx, pe, and care plan for this patient.  Jimenez: 75 yof with asthma, Oa, DVT, PE lymphedema, complaining of right sided lower back pain intermittent for 4 days radiating to abdomen, improved when still and lying down. Pt has hx of urinary frequency, no fever or chills, no dysuria, nausea, hematuria. Pt when it occurs is severe and never happened prior. Denies any radiating down leg, numbness or tingling. Pt is well appearing, obese, no distress, clear lungs, normal cardiac, abd soft and non tender (minimal Right mid tn initial but not reproducible), no CVAT, le edema left greater than right with right ankle old deformity. pulses strong bilaterally, no tn with FROM of right hip. Labs. UA CT r/o stone, GI pathology

## 2021-10-17 ENCOUNTER — NON-APPOINTMENT (OUTPATIENT)
Age: 76
End: 2021-10-17

## 2021-10-17 VITALS
SYSTOLIC BLOOD PRESSURE: 158 MMHG | DIASTOLIC BLOOD PRESSURE: 85 MMHG | TEMPERATURE: 98 F | RESPIRATION RATE: 17 BRPM | HEART RATE: 62 BPM | OXYGEN SATURATION: 100 %

## 2021-10-17 LAB
ANION GAP SERPL CALC-SCNC: 14 MMOL/L — SIGNIFICANT CHANGE UP (ref 7–14)
BACTERIA # UR AUTO: NEGATIVE — SIGNIFICANT CHANGE UP
BASOPHILS # BLD AUTO: 0.05 K/UL — SIGNIFICANT CHANGE UP (ref 0–0.2)
BASOPHILS NFR BLD AUTO: 0.9 % — SIGNIFICANT CHANGE UP (ref 0–2)
BUN SERPL-MCNC: 14 MG/DL — SIGNIFICANT CHANGE UP (ref 7–23)
CALCIUM SERPL-MCNC: 9.5 MG/DL — SIGNIFICANT CHANGE UP (ref 8.4–10.5)
CHLORIDE SERPL-SCNC: 97 MMOL/L — LOW (ref 98–107)
CO2 SERPL-SCNC: 24 MMOL/L — SIGNIFICANT CHANGE UP (ref 22–31)
CREAT SERPL-MCNC: 0.71 MG/DL — SIGNIFICANT CHANGE UP (ref 0.5–1.3)
EOSINOPHIL # BLD AUTO: 0.1 K/UL — SIGNIFICANT CHANGE UP (ref 0–0.5)
EOSINOPHIL NFR BLD AUTO: 1.7 % — SIGNIFICANT CHANGE UP (ref 0–6)
GLUCOSE SERPL-MCNC: 89 MG/DL — SIGNIFICANT CHANGE UP (ref 70–99)
LYMPHOCYTES # BLD AUTO: 2.22 K/UL — SIGNIFICANT CHANGE UP (ref 1–3.3)
LYMPHOCYTES # BLD AUTO: 38.3 % — SIGNIFICANT CHANGE UP (ref 13–44)
MONOCYTES # BLD AUTO: 0.25 K/UL — SIGNIFICANT CHANGE UP (ref 0–0.9)
MONOCYTES NFR BLD AUTO: 4.3 % — SIGNIFICANT CHANGE UP (ref 2–14)
NEUTROPHILS # BLD AUTO: 3.03 K/UL — SIGNIFICANT CHANGE UP (ref 1.8–7.4)
NEUTROPHILS NFR BLD AUTO: 52.2 % — SIGNIFICANT CHANGE UP (ref 43–77)
POTASSIUM SERPL-MCNC: 3.6 MMOL/L — SIGNIFICANT CHANGE UP (ref 3.5–5.3)
POTASSIUM SERPL-SCNC: 3.6 MMOL/L — SIGNIFICANT CHANGE UP (ref 3.5–5.3)
RBC CASTS # UR COMP ASSIST: SIGNIFICANT CHANGE UP /HPF (ref 0–4)
SODIUM SERPL-SCNC: 135 MMOL/L — SIGNIFICANT CHANGE UP (ref 135–145)
WBC UR QL: SIGNIFICANT CHANGE UP /HPF (ref 0–5)

## 2021-10-17 PROCEDURE — 74177 CT ABD & PELVIS W/CONTRAST: CPT | Mod: 26

## 2021-10-18 LAB
CULTURE RESULTS: SIGNIFICANT CHANGE UP
SPECIMEN SOURCE: SIGNIFICANT CHANGE UP

## 2021-10-25 ENCOUNTER — APPOINTMENT (OUTPATIENT)
Dept: INTERNAL MEDICINE | Facility: CLINIC | Age: 76
End: 2021-10-25
Payer: MEDICARE

## 2021-10-25 VITALS
DIASTOLIC BLOOD PRESSURE: 78 MMHG | HEART RATE: 65 BPM | WEIGHT: 233 LBS | OXYGEN SATURATION: 98 % | TEMPERATURE: 98.6 F | BODY MASS INDEX: 42.88 KG/M2 | SYSTOLIC BLOOD PRESSURE: 124 MMHG | HEIGHT: 62 IN

## 2021-10-25 DIAGNOSIS — N28.1 CYST OF KIDNEY, ACQUIRED: ICD-10-CM

## 2021-10-25 DIAGNOSIS — K76.89 OTHER SPECIFIED DISEASES OF LIVER: ICD-10-CM

## 2021-10-25 DIAGNOSIS — Z09 ENCOUNTER FOR FOLLOW-UP EXAMINATION AFTER COMPLETED TREATMENT FOR CONDITIONS OTHER THAN MALIGNANT NEOPLASM: ICD-10-CM

## 2021-10-25 LAB
INR PPP: 4.38 RATIO
PT BLD: 48.6 SEC

## 2021-10-25 PROCEDURE — 36415 COLL VENOUS BLD VENIPUNCTURE: CPT

## 2021-10-25 PROCEDURE — 99213 OFFICE O/P EST LOW 20 MIN: CPT | Mod: 25

## 2021-10-25 RX ORDER — PREGABALIN 75 MG/1
75 CAPSULE ORAL
Qty: 30 | Refills: 0 | Status: DISCONTINUED | COMMUNITY
Start: 2021-10-11 | End: 2021-10-25

## 2021-10-25 RX ORDER — POTASSIUM CHLORIDE 1500 MG/1
20 TABLET, FILM COATED, EXTENDED RELEASE ORAL
Qty: 3 | Refills: 0 | Status: DISCONTINUED | COMMUNITY
Start: 2021-08-27 | End: 2021-10-25

## 2021-10-25 RX ORDER — AMOXICILLIN 500 MG/1
500 CAPSULE ORAL
Qty: 21 | Refills: 0 | Status: DISCONTINUED | COMMUNITY
Start: 2021-07-14

## 2021-11-02 LAB
INR PPP: 2.73 RATIO
PT BLD: 30.7 SEC

## 2021-11-18 ENCOUNTER — APPOINTMENT (OUTPATIENT)
Dept: ULTRASOUND IMAGING | Facility: IMAGING CENTER | Age: 76
End: 2021-11-18
Payer: MEDICARE

## 2021-11-18 ENCOUNTER — OUTPATIENT (OUTPATIENT)
Dept: OUTPATIENT SERVICES | Facility: HOSPITAL | Age: 76
LOS: 1 days | End: 2021-11-18
Payer: COMMERCIAL

## 2021-11-18 DIAGNOSIS — Z98.89 OTHER SPECIFIED POSTPROCEDURAL STATES: Chronic | ICD-10-CM

## 2021-11-18 DIAGNOSIS — Z98.890 OTHER SPECIFIED POSTPROCEDURAL STATES: Chronic | ICD-10-CM

## 2021-11-18 DIAGNOSIS — K76.89 OTHER SPECIFIED DISEASES OF LIVER: ICD-10-CM

## 2021-11-18 DIAGNOSIS — Z96.651 PRESENCE OF RIGHT ARTIFICIAL KNEE JOINT: Chronic | ICD-10-CM

## 2021-11-18 DIAGNOSIS — Z00.8 ENCOUNTER FOR OTHER GENERAL EXAMINATION: ICD-10-CM

## 2021-11-18 DIAGNOSIS — Z98.1 ARTHRODESIS STATUS: Chronic | ICD-10-CM

## 2021-11-18 DIAGNOSIS — N28.1 CYST OF KIDNEY, ACQUIRED: ICD-10-CM

## 2021-11-18 DIAGNOSIS — I83.93 ASYMPTOMATIC VARICOSE VEINS OF BILATERAL LOWER EXTREMITIES: Chronic | ICD-10-CM

## 2021-11-18 DIAGNOSIS — Z90.49 ACQUIRED ABSENCE OF OTHER SPECIFIED PARTS OF DIGESTIVE TRACT: Chronic | ICD-10-CM

## 2021-11-18 PROCEDURE — 76700 US EXAM ABDOM COMPLETE: CPT | Mod: 26

## 2021-11-18 PROCEDURE — 76700 US EXAM ABDOM COMPLETE: CPT

## 2021-11-21 ENCOUNTER — NON-APPOINTMENT (OUTPATIENT)
Age: 76
End: 2021-11-21

## 2021-12-05 ENCOUNTER — NON-APPOINTMENT (OUTPATIENT)
Age: 76
End: 2021-12-05

## 2021-12-05 LAB
INR PPP: 2.08 RATIO
PT BLD: 23.7 SEC

## 2021-12-07 ENCOUNTER — APPOINTMENT (OUTPATIENT)
Dept: PULMONOLOGY | Facility: CLINIC | Age: 76
End: 2021-12-07
Payer: MEDICARE

## 2021-12-07 VITALS
OXYGEN SATURATION: 96 % | BODY MASS INDEX: 42.88 KG/M2 | HEART RATE: 67 BPM | HEIGHT: 62 IN | SYSTOLIC BLOOD PRESSURE: 150 MMHG | WEIGHT: 233 LBS | TEMPERATURE: 97.2 F | RESPIRATION RATE: 16 BRPM | DIASTOLIC BLOOD PRESSURE: 84 MMHG

## 2021-12-07 PROCEDURE — 99213 OFFICE O/P EST LOW 20 MIN: CPT

## 2021-12-07 NOTE — ASSESSMENT
[FreeTextEntry1] : 1. Asthma - well controlled, c/w Wixela and Albuterol prn\par trigger avoidance\par \par 2. Lung nodules - in a nonsmoker, have been stable from 1468-1146, likely post infectious\par WIll repeat CT prior to next visit. \par \par 3. Obesity - counseled on weight management\par \par 4. Health maintenance- Up to date with vaccines\par \par Follow up in 3-4 months

## 2021-12-07 NOTE — CONSULT LETTER
[Dear  ___] : Dear  [unfilled], [Please see my note below.] : Please see my note below. [Consult Closing:] : Thank you very much for allowing me to participate in the care of this patient.  If you have any questions, please do not hesitate to contact me. [Sincerely,] : Sincerely, [Courtesy Letter:] : I had the pleasure of seeing your patient, [unfilled], in my office today. [FreeTextEntry2] : Dr. Masha Cruz [FreeTextEntry3] : Mayela Billings MD\par  of Medicine\par Associate , Fellowship in Pulmonary and Critical Care Medicine\par Division of Pulmonary, Critical Care & Sleep Medicine\par [employer] [facility]\par \par \par

## 2021-12-07 NOTE — HISTORY OF PRESENT ILLNESS
[Never] : never [TextBox_4] : 74 yo F with a h/o Asthma presents for initial evaluation and to establish care.\par Has been following with Dr. 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 Taylor Flanagan\par Current inhalers: Wixela 250/50 BID for at least 1+years, Albuterol \par In the past has taken Advair as well.\par Symptoms have been controlled, using Albuterol infrequently. Feels dyspneic on exertion on hot/humid days\par Triggers: Humidity, Perfumes, tobacco smoke \par Last steroid use: Prednisone approx 2 years ago\par \par PFTs today: normal spirometry, lung volumes and DLCO (borderline restriction/reduced DLCO - likely from obesity)\par \par CT chest: 10/3/2019\par INTERPRETATION: CLINICAL INFORMATION: Pulmonary nodule follow-up \par COMPARISON: CT chest from 6/11/2019 \par LUNGS AND AIRWAYS: Patent central airways. Unchanged pulmonary nodules compared to prior CT chest from 6/11/2019.\par Two right middle lobe adjacent nodules measuring 6 and 4 mm with adjacent groundglass \par opacity. Other lung nodules measure up to 7 mm are unchanged. \par \par PLEURA: No pleural effusion. \par \par MEDIASTINUM AND CAILIN: No lymphadenopathy. \par \par VESSELS: Within normal limits. \par \par HEART: Heart size is normal. No pericardial effusion. \par \par CHEST WALL AND LOWER NECK: Within normal limits. \par \par VISUALIZED UPPER ABDOMEN: Patchy hypodensities within the liver unchanged \par compared to prior CT chest from 6/11/2019. \par \par BONES: Degenerative changes \par \par Quality metrics:\par Tobacco use - never\par ESS - NA\par \par Vaccines: \par COVID: yes completed in April 2021\par Influenza: receives annually \par Pneumococcal: received 5/2016\par \par \par Follow up OV 12/7/2021:\par Doing well. Received her Influenza vaccine in September.\par Asthma - well controlled, using Wixela, has not needed Albuterol\par Lung nodules - did not have repeat CT chest, had a CT abdomen and the lower lung fields appeared unchanged \par \par

## 2021-12-13 ENCOUNTER — APPOINTMENT (OUTPATIENT)
Dept: RHEUMATOLOGY | Facility: CLINIC | Age: 76
End: 2021-12-13
Payer: MEDICARE

## 2021-12-13 VITALS
DIASTOLIC BLOOD PRESSURE: 78 MMHG | OXYGEN SATURATION: 95 % | HEART RATE: 70 BPM | WEIGHT: 236 LBS | SYSTOLIC BLOOD PRESSURE: 163 MMHG | TEMPERATURE: 97.2 F | BODY MASS INDEX: 43.43 KG/M2 | HEIGHT: 62 IN

## 2021-12-13 PROCEDURE — 99212 OFFICE O/P EST SF 10 MIN: CPT

## 2021-12-17 ENCOUNTER — APPOINTMENT (OUTPATIENT)
Dept: CT IMAGING | Facility: IMAGING CENTER | Age: 76
End: 2021-12-17
Payer: MEDICARE

## 2021-12-17 ENCOUNTER — OUTPATIENT (OUTPATIENT)
Dept: OUTPATIENT SERVICES | Facility: HOSPITAL | Age: 76
LOS: 1 days | End: 2021-12-17
Payer: COMMERCIAL

## 2021-12-17 DIAGNOSIS — I83.93 ASYMPTOMATIC VARICOSE VEINS OF BILATERAL LOWER EXTREMITIES: Chronic | ICD-10-CM

## 2021-12-17 DIAGNOSIS — Z98.890 OTHER SPECIFIED POSTPROCEDURAL STATES: Chronic | ICD-10-CM

## 2021-12-17 DIAGNOSIS — Z90.49 ACQUIRED ABSENCE OF OTHER SPECIFIED PARTS OF DIGESTIVE TRACT: Chronic | ICD-10-CM

## 2021-12-17 DIAGNOSIS — Z98.89 OTHER SPECIFIED POSTPROCEDURAL STATES: Chronic | ICD-10-CM

## 2021-12-17 DIAGNOSIS — Z96.651 PRESENCE OF RIGHT ARTIFICIAL KNEE JOINT: Chronic | ICD-10-CM

## 2021-12-17 DIAGNOSIS — Z98.1 ARTHRODESIS STATUS: Chronic | ICD-10-CM

## 2021-12-17 DIAGNOSIS — Z00.8 ENCOUNTER FOR OTHER GENERAL EXAMINATION: ICD-10-CM

## 2021-12-17 DIAGNOSIS — R91.8 OTHER NONSPECIFIC ABNORMAL FINDING OF LUNG FIELD: ICD-10-CM

## 2021-12-17 PROCEDURE — 71250 CT THORAX DX C-: CPT

## 2021-12-17 PROCEDURE — 71250 CT THORAX DX C-: CPT | Mod: 26

## 2021-12-22 ENCOUNTER — NON-APPOINTMENT (OUTPATIENT)
Age: 76
End: 2021-12-22

## 2021-12-29 ENCOUNTER — LABORATORY RESULT (OUTPATIENT)
Age: 76
End: 2021-12-29

## 2021-12-30 ENCOUNTER — NON-APPOINTMENT (OUTPATIENT)
Age: 76
End: 2021-12-30

## 2022-01-03 ENCOUNTER — NON-APPOINTMENT (OUTPATIENT)
Age: 77
End: 2022-01-03

## 2022-01-03 ENCOUNTER — APPOINTMENT (OUTPATIENT)
Dept: OPHTHALMOLOGY | Facility: CLINIC | Age: 77
End: 2022-01-03
Payer: MEDICARE

## 2022-01-03 PROCEDURE — 92134 CPTRZ OPH DX IMG PST SGM RTA: CPT

## 2022-01-03 PROCEDURE — 92004 COMPRE OPH EXAM NEW PT 1/>: CPT

## 2022-01-14 ENCOUNTER — RX RENEWAL (OUTPATIENT)
Age: 77
End: 2022-01-14

## 2022-01-18 ENCOUNTER — RX RENEWAL (OUTPATIENT)
Age: 77
End: 2022-01-18

## 2022-01-26 ENCOUNTER — LABORATORY RESULT (OUTPATIENT)
Age: 77
End: 2022-01-26

## 2022-01-27 ENCOUNTER — NON-APPOINTMENT (OUTPATIENT)
Age: 77
End: 2022-01-27

## 2022-02-09 ENCOUNTER — NON-APPOINTMENT (OUTPATIENT)
Age: 77
End: 2022-02-09

## 2022-02-24 ENCOUNTER — LABORATORY RESULT (OUTPATIENT)
Age: 77
End: 2022-02-24

## 2022-02-25 ENCOUNTER — NON-APPOINTMENT (OUTPATIENT)
Age: 77
End: 2022-02-25

## 2022-02-25 NOTE — DISCHARGE NOTE ADULT - WARFARIN/COUMADIN MAY BE TAKEN WITH OTHER MEDICATIONS OR FOOD.
Statement Selected Continue Regimen: strict photoprotection, including broad spectrum sunscreen with spf 30+ qam and q2hrs prn

## 2022-03-23 ENCOUNTER — LABORATORY RESULT (OUTPATIENT)
Age: 77
End: 2022-03-23

## 2022-03-30 ENCOUNTER — APPOINTMENT (OUTPATIENT)
Dept: PULMONOLOGY | Facility: CLINIC | Age: 77
End: 2022-03-30
Payer: MEDICARE

## 2022-03-30 VITALS
RESPIRATION RATE: 17 BRPM | BODY MASS INDEX: 44.35 KG/M2 | TEMPERATURE: 98.2 F | WEIGHT: 241 LBS | HEIGHT: 62 IN | OXYGEN SATURATION: 97 % | SYSTOLIC BLOOD PRESSURE: 153 MMHG | DIASTOLIC BLOOD PRESSURE: 89 MMHG | HEART RATE: 64 BPM

## 2022-03-30 PROCEDURE — 99213 OFFICE O/P EST LOW 20 MIN: CPT

## 2022-03-30 RX ORDER — FLUTICASONE PROPIONATE 50 UG/1
50 SPRAY, METERED NASAL
Qty: 16 | Refills: 5 | Status: ACTIVE | COMMUNITY
Start: 2019-05-21 | End: 1900-01-01

## 2022-03-30 NOTE — HISTORY OF PRESENT ILLNESS
[Never] : never [TextBox_4] : 74 yo F with a h/o Asthma \par \par 8/25/21: Initial evaluation and to establish care.\par Has been following with Dr. 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 Taylor Flanagan\par Current inhalers: Wixela 250/50 BID for at least 1+years, Albuterol \par In the past has taken Advair as well.\par Symptoms have been controlled, using Albuterol infrequently. Feels dyspneic on exertion on hot/humid days\par Triggers: Humidity, Perfumes, tobacco smoke \par Last steroid use: Prednisone approx 2 years ago\par \par PFTs today: normal spirometry, lung volumes and DLCO (borderline restriction/reduced DLCO - likely from obesity)\par \par CT chest: 10/3/2019\par INTERPRETATION: CLINICAL INFORMATION: Pulmonary nodule follow-up \par COMPARISON: CT chest from 6/11/2019 \par LUNGS AND AIRWAYS: Patent central airways. Unchanged pulmonary nodules compared to prior CT chest from 6/11/2019.\par Two right middle lobe adjacent nodules measuring 6 and 4 mm with adjacent groundglass \par opacity. Other lung nodules measure up to 7 mm are unchanged. \par \par PLEURA: No pleural effusion. \par \par MEDIASTINUM AND CAILIN: No lymphadenopathy. \par \par VESSELS: Within normal limits. \par \par HEART: Heart size is normal. No pericardial effusion. \par \par CHEST WALL AND LOWER NECK: Within normal limits. \par \par VISUALIZED UPPER ABDOMEN: Patchy hypodensities within the liver unchanged \par compared to prior CT chest from 6/11/2019. \par \par BONES: Degenerative changes \par \par Quality metrics:\par Tobacco use - never\par ESS - NA\par \par Vaccines: \par COVID: yes completed in April 2021\par Influenza: receives annually \par Pneumococcal: received 5/2016\par \par \par Follow up OV 12/7/2021:\par Doing well. Received her Influenza vaccine in September.\par Asthma - well controlled, using Wixela, has not needed Albuterol\par Lung nodules - did not have repeat CT chest, had a CT abdomen and the lower lung fields appeared unchanged \par \par Follow up OV 3/30/22:\par Asthma - well controlled, using Wixela regularly, not requiring Albuterol \par Lung nodules - in a nonsmoker, have been stable from 0717-9617, likely post infectious\par CT chest 12/27/21: Mild tree-in bud opacity within the lingula, unchanged. Few small fissural triangular nodules and stable beginning intrapulmonary lymph node. New 6 mm subpleural opacity within lingula adjacent to the fissure, likely focus of nodular atelectasis. \par \par

## 2022-03-30 NOTE — CONSULT LETTER
[Dear  ___] : Dear  [unfilled], [Courtesy Letter:] : I had the pleasure of seeing your patient, [unfilled], in my office today. [Please see my note below.] : Please see my note below. [Consult Closing:] : Thank you very much for allowing me to participate in the care of this patient.  If you have any questions, please do not hesitate to contact me. [Sincerely,] : Sincerely, [FreeTextEntry2] : Dr. Masha Cruz [FreeTextEntry3] : Mayela Billings MD\par  of Medicine\par Associate , Fellowship in Pulmonary and Critical Care Medicine\par Division of Pulmonary, Critical Care & Sleep Medicine\par [employer] [facility]\par \par \par

## 2022-04-12 NOTE — ASSESSMENT
[FreeTextEntry1] : 1. Asthma - well controlled, c/w Wixela and Albuterol prn- sent presciptions to [facility] pharmacy\par trigger avoidance\par \par 2. Lung nodules - in a nonsmoker, have been stable, likely post infectious\par \par 3. Obesity - counseled on weight management\par \par 4. Health maintenance- Up to date with vaccines\par \par Follow up in 6 months 12-Apr-2022 20:03

## 2022-04-21 ENCOUNTER — RX RENEWAL (OUTPATIENT)
Age: 77
End: 2022-04-21

## 2022-04-28 LAB
INR PPP: 2.21 RATIO
PT BLD: 26.3 SEC

## 2022-05-02 ENCOUNTER — RX RENEWAL (OUTPATIENT)
Age: 77
End: 2022-05-02

## 2022-05-23 ENCOUNTER — APPOINTMENT (OUTPATIENT)
Dept: INTERNAL MEDICINE | Facility: CLINIC | Age: 77
End: 2022-05-23
Payer: MEDICARE

## 2022-05-23 ENCOUNTER — NON-APPOINTMENT (OUTPATIENT)
Age: 77
End: 2022-05-23

## 2022-05-23 VITALS
BODY MASS INDEX: 44.35 KG/M2 | DIASTOLIC BLOOD PRESSURE: 85 MMHG | WEIGHT: 241 LBS | SYSTOLIC BLOOD PRESSURE: 147 MMHG | TEMPERATURE: 97.8 F | HEART RATE: 67 BPM | OXYGEN SATURATION: 97 % | HEIGHT: 62 IN

## 2022-05-23 VITALS — SYSTOLIC BLOOD PRESSURE: 142 MMHG | DIASTOLIC BLOOD PRESSURE: 80 MMHG

## 2022-05-23 DIAGNOSIS — R92.2 INCONCLUSIVE MAMMOGRAM: ICD-10-CM

## 2022-05-23 DIAGNOSIS — Z12.39 ENCOUNTER FOR OTHER SCREENING FOR MALIGNANT NEOPLASM OF BREAST: ICD-10-CM

## 2022-05-23 DIAGNOSIS — R07.9 CHEST PAIN, UNSPECIFIED: ICD-10-CM

## 2022-05-23 DIAGNOSIS — R63.5 ABNORMAL WEIGHT GAIN: ICD-10-CM

## 2022-05-23 PROCEDURE — 93000 ELECTROCARDIOGRAM COMPLETE: CPT | Mod: 59

## 2022-05-23 PROCEDURE — 99213 OFFICE O/P EST LOW 20 MIN: CPT | Mod: 25

## 2022-05-23 PROCEDURE — 36415 COLL VENOUS BLD VENIPUNCTURE: CPT

## 2022-05-25 LAB
25(OH)D3 SERPL-MCNC: 18.6 NG/ML
ALBUMIN SERPL ELPH-MCNC: 4.1 G/DL
ALP BLD-CCNC: 125 U/L
ALT SERPL-CCNC: 12 U/L
ANION GAP SERPL CALC-SCNC: 10 MMOL/L
AST SERPL-CCNC: 19 U/L
BASOPHILS # BLD AUTO: 0.06 K/UL
BASOPHILS NFR BLD AUTO: 1.2 %
BILIRUB SERPL-MCNC: 0.2 MG/DL
BUN SERPL-MCNC: 16 MG/DL
CALCIUM SERPL-MCNC: 8.7 MG/DL
CHLORIDE SERPL-SCNC: 103 MMOL/L
CHOLEST SERPL-MCNC: 152 MG/DL
CO2 SERPL-SCNC: 28 MMOL/L
CREAT SERPL-MCNC: 0.74 MG/DL
EGFR: 84 ML/MIN/1.73M2
EOSINOPHIL # BLD AUTO: 0.08 K/UL
EOSINOPHIL NFR BLD AUTO: 1.6 %
ESTIMATED AVERAGE GLUCOSE: 114 MG/DL
GLUCOSE SERPL-MCNC: 98 MG/DL
HBA1C MFR BLD HPLC: 5.6 %
HCT VFR BLD CALC: 37.3 %
HDLC SERPL-MCNC: 75 MG/DL
HGB BLD-MCNC: 11.3 G/DL
IMM GRANULOCYTES NFR BLD AUTO: 0.2 %
INR PPP: 2.14 RATIO
LDLC SERPL CALC-MCNC: 64 MG/DL
LYMPHOCYTES # BLD AUTO: 1.78 K/UL
LYMPHOCYTES NFR BLD AUTO: 34.7 %
MAN DIFF?: NORMAL
MCHC RBC-ENTMCNC: 22 PG
MCHC RBC-ENTMCNC: 30.3 GM/DL
MCV RBC AUTO: 72.7 FL
MONOCYTES # BLD AUTO: 0.54 K/UL
MONOCYTES NFR BLD AUTO: 10.5 %
NEUTROPHILS # BLD AUTO: 2.66 K/UL
NEUTROPHILS NFR BLD AUTO: 51.8 %
NONHDLC SERPL-MCNC: 77 MG/DL
PLATELET # BLD AUTO: 320 K/UL
POTASSIUM SERPL-SCNC: 4.4 MMOL/L
PROT SERPL-MCNC: 6.6 G/DL
PT BLD: 25 SEC
RBC # BLD: 5.13 M/UL
RBC # FLD: 17.4 %
SODIUM SERPL-SCNC: 141 MMOL/L
TRIGL SERPL-MCNC: 67 MG/DL
TROPONIN I SERPL-MCNC: <0.01 NG/ML
TSH SERPL-ACNC: 1.17 UIU/ML
WBC # FLD AUTO: 5.13 K/UL

## 2022-05-26 NOTE — PATIENT PROFILE ADULT. - PRO ARRIVE FROM
home Arava Counseling:  Patient counseled regarding adverse effects of Arava including but not limited to nausea, vomiting, abnormalities in liver function tests. Patients may develop mouth sores, rash, diarrhea, and abnormalities in blood counts. The patient understands that monitoring is required including LFTs and blood counts.  There is a rare possibility of scarring of the liver and lung problems that can occur when taking methotrexate. Persistent nausea, loss of appetite, pale stools, dark urine, cough, and shortness of breath should be reported immediately. Patient advised to discontinue Arava treatment and consult with a physician prior to attempting conception. The patient will have to undergo a treatment to eliminate Arava from the body prior to conception.

## 2022-06-13 ENCOUNTER — RX RENEWAL (OUTPATIENT)
Age: 77
End: 2022-06-13

## 2022-06-13 ENCOUNTER — APPOINTMENT (OUTPATIENT)
Dept: RHEUMATOLOGY | Facility: CLINIC | Age: 77
End: 2022-06-13
Payer: MEDICARE

## 2022-06-13 PROCEDURE — 99213 OFFICE O/P EST LOW 20 MIN: CPT | Mod: 95

## 2022-06-14 NOTE — REASON FOR VISIT
[Home] : at home, [unfilled] , at the time of the visit. [Medical Office: ([facility])___] : at the medical office located in  [Follow-Up: _____] : a [unfilled] follow-up visit [FreeTextEntry1] : FMS

## 2022-06-14 NOTE — ASSESSMENT
[FreeTextEntry1] : see HPI\par stable \par refill will be given \par fuv 6 mo\par patient is aware of and in agreement with assessment and plans\par

## 2022-06-14 NOTE — HISTORY OF PRESENT ILLNESS
[FreeTextEntry1] : fuv \par video 22 min\par FMS \par stable on lyrica \par feels lonely now that her grandson moved out but lyrica controls the FMS\par does also have severe OA but she is working on that with ortho\par

## 2022-06-23 ENCOUNTER — LABORATORY RESULT (OUTPATIENT)
Age: 77
End: 2022-06-23

## 2022-07-12 NOTE — PHYSICAL THERAPY INITIAL EVALUATION ADULT - ADDITIONAL COMMENTS
Self
Pt. lives alone in an apartment complex. Pt. has one step to enter with +elevator. Pt. owns SAC, rollator, raised toilet seat

## 2022-07-28 ENCOUNTER — APPOINTMENT (OUTPATIENT)
Dept: INTERNAL MEDICINE | Facility: CLINIC | Age: 77
End: 2022-07-28

## 2022-08-11 NOTE — CONSULT NOTE ADULT - OPHTHALMOLOGIC
EMERGENCY DEPARTMENT HISTORY AND PHYSICAL EXAM      Date: 8/11/2022  Patient Name: Fransico Graves    History of Presenting Illness     Chief Complaint   Patient presents with    Insect Bite       History Provided By: Patient    HPI: Fransico Graves, 33 y.o. female with PMHx of HIV on HAART, presents BIB self to the ED with cc of insect bites to her buttocks. The patient noticed the lesions on her left buttock 2 days ago, at the end of a 1 week stay in a hotel room with friends. She denies seeing any bugs or insects. Her friends are unaffected. The lesions are described as itching and burning in character. She denies lesions elsewhere, fevers, myalgias. No history of similar lesions. There are no other complaints, changes, or physical findings at this time. PCP: None    No current facility-administered medications on file prior to encounter. Current Outpatient Medications on File Prior to Encounter   Medication Sig Dispense Refill    elviteg/cob/emtri/tenof alafen (GENVOYA PO) Take  by mouth.      galantamine HBr (RAZADYNE PO) Take  by mouth. albuterol (Ventolin HFA) 90 mcg/actuation inhaler Take 1 Puff by inhalation every four (4) hours as needed for Wheezing. 18 g 0       Past History     Past Medical History:  Past Medical History:   Diagnosis Date    [facility] virus [facility]     Perianal abscess 7/14/2022       Past Surgical History:  No past surgical history on file. Family History:  No family history on file. Social History:  Social History     Tobacco Use    Smoking status: Every Day     Packs/day: 1.00     Types: Cigarettes    Smokeless tobacco: Never   Vaping Use    Vaping Use: Unknown   Substance Use Topics    Alcohol use: Yes     Comment: occ    Drug use: No       Allergies:  No Known Allergies      Review of Systems   Review of Systems   Constitutional:  Negative for fever. Musculoskeletal:  Negative for myalgias. Skin:  Positive for rash. Hematological:         HIV+     Physical Exam   Physical Exam  Vitals and nursing note reviewed. Constitutional:       General: She is not in acute distress. Appearance: Normal appearance. She is well-developed. HENT:      Head: Normocephalic and atraumatic. Eyes:      General: Lids are normal.      Extraocular Movements: Extraocular movements intact. Conjunctiva/sclera: Conjunctivae normal.   Pulmonary:      Effort: Pulmonary effort is normal.   Musculoskeletal:         General: Normal range of motion. Cervical back: Normal range of motion and neck supple. Skin:     General: Skin is warm and dry. Comments: Several groups of numerous clustered vesicular lesions scattered to the medial aspect of the left buttock. There is also a group of vesicles left of the vaginal introitus/at the crease of the medial superior thigh. Neurological:      General: No focal deficit present. Mental Status: She is alert and oriented to person, place, and time. Psychiatric:         Mood and Affect: Mood normal.         Behavior: Behavior normal. Behavior is cooperative. Diagnostic Study Results     Labs -   No results found for this or any previous visit (from the past 12 hour(s)). Radiologic Studies -   No orders to display     CT Results  (Last 48 hours)      None          CXR Results  (Last 48 hours)      None              Medical Decision Making   I am the first provider for this patient. I reviewed the vital signs, available nursing notes, past medical history, past surgical history, family history and social history. Vital Signs-Reviewed the patient's vital signs.   Patient Vitals for the past 12 hrs:   Temp Pulse Resp BP SpO2   08/11/22 1548 98.3 °F (36.8 °C) 91 16 120/79 100 %       Records Reviewed: Nursing Notes and Old Medical Records    Provider Notes (Medical Decision Making):   Multiple groups of clustered vesicular lesions at the left buttocks and left introitus, consistent with the S2, potentially S3 dermatome. Presentation most suggestive of herpes zoster. Will initiate a course of 3 times daily Valtrex. Tylenol or ibuprofen for pain. Advised on contagious nature of the rash. Follow-up with PCP in 1 to 2 days. ED return precautions given. ED Course:   Initial assessment performed. The patients presenting problems have been discussed, and they are in agreement with the care plan formulated and outlined with them. I have encouraged them to ask questions as they arise throughout their visit. Critical Care Time: None    Disposition:  dc    PLAN:  1. Current Discharge Medication List        START taking these medications    Details   valACYclovir (VALTREX) 1 gram tablet Take 1 Tablet by mouth three (3) times daily for 7 days. Qty: 21 Tablet, Refills: 0  Start date: 8/11/2022, End date: 8/18/2022           2. Follow-up Information       Follow up With Specialties Details Why [facility] EMERGENCY DEPT Emergency Medicine  As needed, If symptoms worsen [place]    Your PCP  Call  For follow up in 2-3 days           Return to ED if worse     Diagnosis     Clinical Impression:   1. Herpes zoster without complication          Please note that this dictation was completed with Quid, the computer voice recognition software. Quite often unanticipated grammatical, syntax, homophones, and other interpretive errors are inadvertently transcribed by the computer software. Please disregards these errors. Please excuse any errors that have escaped final proofreading. negative

## 2022-08-16 NOTE — ED ADULT TRIAGE NOTE - NS ED NURSE BANDS TYPE
Assessment/Plan:    - I reviewed his x-rays of both feet and ankles  No fractures nor dislocations noted; mild pes planus deformity noted B/L   - Treatment options were discussed, and as he knows that he has had some success with over-the-counter arch supports he would like to proceed with the manufacture of custom molded foot orthoses for his feet  - He was referred to [facility] physical therapy for the manufacture of the orthoses  - I recommended that he follow up within 6-8 weeks after his orthotics have been dispensed and he is able to wear them for a while  Diagnoses and all orders for this visit:    Pes planus of both feet  -     Ambulatory referral to Physical Therapy; Future    Chronic pain of left knee  -     Ambulatory Referral to Podiatry    Pes planus, unspecified laterality  -     Ambulatory Referral to Podiatry  -     X-ray foot left 3+ views; Future  -     Cancel: X-ray ankle left 3+ views; Future  -     X-ray foot right 3+ views; Future  -     Cancel: X-ray ankle right 3+ views; Future  -     Ambulatory referral to Physical Therapy; Future          Subjective:      Patient ID: Yoselyn Bruno is a 40 y o  male  The patient presents today with a chief complaint of bilateral flat feet  He has only occasional mild pain to his feet but also notes occasional pain to the medial aspect of his knees bilaterally  This occurs maybe once or twice per week and is associated with increased activity  He was given a pair of over-the-counter arch supports by his  and he did notice some slight benefit but would be interested in something custom molded for his feet        The following portions of the patient's history were reviewed and updated as appropriate: allergies, current medications, past family history, past medical history, past social history, past surgical history and problem list       PAST MEDICAL HISTORY:  Past Medical History:   Diagnosis Date    Allergic rhinitis     Onset: 4/28/2008    Chronic lower back pain 12/29/2016    Dentoalveolar abscess     Last Assessed: 11/05/2012    Dermatitis 3/5/2018    Diabetes ([address] )     Onset: 4/14/2009    Golfers elbow, right 8/20/2019    Hordeolum externum unspecified eye, unspecified eyelid     Onset: 6/30/2009    IBS (irritable bowel syndrome)     Onset: 5/23/2008    Impaired fasting glucose     Onset: 4/28/2009    Plantar wart, left foot     Last Assessed: 12/29/2016    Plantar wart, right foot 8/20/2019    Recurrent cold sores 6/27/2018    Rosacea     Onset: 3/21/2006    Viral wart on finger 8/20/2019       PAST SURGICAL HISTORY:  History reviewed  No pertinent surgical history  ALLERGIES:  Penicillin g, Penicillins, and Pollen extract    MEDICATIONS:  Current Outpatient Medications   Medication Sig Dispense Refill    cetirizine (ZyrTEC) 10 mg tablet Take 1 tablet (10 mg total) by mouth daily 90 tablet 1    mometasone (NASONEX) 50 mcg/act nasal spray 2 sprays into each nostril daily 1 Act 5    Multiple Vitamin (MULTIVITAMIN ADULT PO) Take by mouth      olopatadine (PATANOL) 0 1 % ophthalmic solution Administer 1 drop to both eyes 2 (two) times a day 5 mL 2     No current facility-administered medications for this visit         SOCIAL HISTORY:  Social History     Socioeconomic History    Marital status:      Spouse name: None    Number of children: 2    Years of education: None    Highest education level: None   Occupational History    None   Tobacco Use    Smoking status: Never Smoker    Smokeless tobacco: Never Used   Vaping Use    Vaping Use: Never used   Substance and Sexual Activity    Alcohol use: Never    Drug use: Never    Sexual activity: None   Other Topics Concern    None   Social History Narrative    Caffeine use     Social Determinants of Health     Financial Resource Strain: Not on file   Food Insecurity: Not on file   Transportation Needs: Not on file   Physical Activity: Not on file   Stress: Not on file   Social Connections: Not on file   Intimate Partner Violence: Not on file   Housing Stability: Not on file        Review of Systems   Constitutional: Negative for chills and fever  HENT: Negative for ear pain and sore throat  Eyes: Negative for pain and visual disturbance  Respiratory: Negative for cough and shortness of breath  Cardiovascular: Negative for chest pain and palpitations  Gastrointestinal: Negative for abdominal pain and vomiting  Genitourinary: Negative for dysuria and hematuria  Musculoskeletal: Negative for arthralgias and back pain  Skin: Negative for color change and rash  Neurological: Negative for seizures and syncope  Psychiatric/Behavioral: Negative  All other systems reviewed and are negative  Objective:      Ht 5' 11" (1 803 m)   Wt 89 8 kg (198 lb)   BMI 27 62 kg/m²          Physical Exam  Vitals reviewed  Constitutional:       Appearance: Normal appearance  HENT:      Head: Normocephalic and atraumatic  Eyes:      Conjunctiva/sclera: Conjunctivae normal       Pupils: Pupils are equal, round, and reactive to light  Cardiovascular:      Pulses:           Dorsalis pedis pulses are 2+ on the right side and 2+ on the left side  Posterior tibial pulses are 2+ on the right side and 2+ on the left side  Pulmonary:      Effort: Pulmonary effort is normal    Musculoskeletal:         General: Normal range of motion  Feet:      Right foot:      Skin integrity: Skin integrity normal       Left foot:      Skin integrity: Skin integrity normal       Comments: There is a flexible pes planovalgus deformity of both feet; there is no tenderness to palpation of the sinus tarsi; no tenderness to palpation of the posterior tibial tendon nor the navicular tuberosity; there is a positive too many toes sign consistent with a hyperpronation bilaterally  Skin:     General: Skin is warm        Capillary Refill: Capillary refill takes less than 2 Allergy; seconds  Neurological:      General: No focal deficit present  Mental Status: He is alert and oriented to person, place, and time  Psychiatric:         Mood and Affect: Mood normal          Behavior: Behavior normal          Thought Content:  Thought content normal

## 2022-08-23 NOTE — OCCUPATIONAL THERAPY INITIAL EVALUATION ADULT - ASSISTIVE DEVICE:SUPINE/SIT, REHAB EVAL
[facility] CARDIOLOGY  [address] [place] [address], [address] [facility], [address] [place] [address]  PHONE: [phone]        22      NAME:  Mila Harris  : 1937  MRN: [id number]     Referring Cardiologist: Macarena Nj MD    Reason for Consultation: Atrial fibrillation      ASSESSMENT and PLAN:  Diagnoses and all orders for this visit:      1. Persistent atrial fibrillation ([address]      2. HTN (hypertension), benign      3. Asymptomatic PVCs     4. Symptomatic bradycardia s/p DCPM, 2021     81year old male with a history of persistent AF. He is s/p DCPM in 2021. He had recurrent AF in 2022 s/p DCCV. Remains in NSR with device therapies turned on for his device.      -Symptomatic bradycardia - s/p DCPM. Stable function. -pAF - continue Multaq and [address]. Ok to reduce to 1/2 dose.    -Abnormal stress test - pt previously declined LHC. Given this would avoid class 1c AADs. -Routine cardiac care per Dr. Yola Rodgers. -EP follow up PRN. Thank you for allowing me to participate in the electrophysiologic care of Mr. Mila Harris. Please contact me if any questions or concerns were to arise. Jeannie Adams MD, MS  Clinical Cardiac Electrophysiology  [facility]  22  10:42 AM    ===================================================================  Chief Complant:    Chief Complaint   Patient presents with    Irregular Heart Beat    Device Check     Post CVN/ pacer/ discuss Multaq    Results     echo        Consultation is requested by No ref. provider found for evaluation of Irregular Heart Beat, Device Check (Post CVN/ pacer/ discuss Multaq), and Results (echo)    History:  Mila Harris is a most pleasant 81 y.o. male with a past medical and cardiac history significant for a history atrial fibrillation. He is referred by Dr. Yola Rodgers for electrophysiologic  evaluation. He has had a diagnosis of AF since summer of 2020.  He has undergone a BERTHA (difficult to get probe down  neck issues). He is now on amiodarone and remains in AF. He feels a little fatigue and dyspneic at times. 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 He comes in for followup. He underwent a DCPM last year. He went into persistent AF recently and underwent a successful DCCV. He is now on Multaq and atrial therapies were turned on his PPM. The patient otherwise denies chest pain, dyspnea, presyncope, syncope or lateralizing symptoms. , originally from [facility] [place] [facility] [employer]. Cardiac PMH: (Old records have been reviewed and summarized below)      EKG:  (EKG has been independently visualized by me with interpretation below): A paced, V sensed. Normal axis, no ischemia. Monitor: 4/2017, brief SVT, PACs, sinus tach. Mostly NSR. Monitor: 4/2021, chronotropic incompetence, no AF. ECHO: 7/2022     Left Ventricle: Normal left ventricular systolic function with a visually estimated EF of 60 - 65%. Left ventricle size is normal. Mildly increased wall thickness. Normal wall motion. Aortic Valve: Moderately calcified cusp. Mild regurgitation. Moderate stenosis of the aortic valve. Noted by the apical view. AV mean gradient is 21 mmHg. AV peak gradient is 39 mmHg. LVOT:AV VTI Index is 0.18. Mitral Valve: Moderate regurgitation with an eccentrically directed jet and may underestimate severity. Tricuspid Valve: Mildly elevated RVSP. The estimated RVSP is 48 mmHg. Left Atrium: Left atrium is moderately dilated. LA Vol Index is  51 ml/m2. Right Atrium: Right atrium is moderately dilated. Technical qualifiers: Color flow Doppler was performed and pulse wave and/or continuous wave Doppler was performed. ECHO: 5/2017, EF WNL, moderate LAE, trivial AI, RVSP 35 mmHg, mild MAC, AoScl      Previous Heart Catheterization: n/a       Stress Test: 6/2020   Left Ventricular Size: normal.   Transient Ischemic Dilatation: not present.    Gated cine images: global hypokinesis which is mild. Ejection Fraction:  45%   CONCLUSION:   1. Stress EKG: Abnormal due to ST segment changes as described above. 2. SPECT Perfusion Imaging: Abnormal due to inferolateral ischemia. 3. LV Systolic Function is mildly abnormal.   4. Risk Assessment:  Moderate Risk Scan. Past Medical History, Past Surgical History, Family history, Social History, and Medications were all reviewed with the patient today and updated as necessary. Current Outpatient Medications   Medication Sig Dispense Refill    Multiple Vitamins-Minerals (MULTIVITAMIN MEN 50+ PO) Take by mouth      dronedarone hcl (MULTAQ) 400 MG TABS Take 1 tablet by mouth 2 times daily (with meals) 60 tablet 1    apixaban (ELIQUIS) 5 MG TABS tablet Take 5 mg by mouth 2 times daily      vitamin D (CHOLECALCIFEROL) 25 MCG (1000 UT) TABS tablet Take by mouth daily      dicyclomine (BENTYL) 10 MG capsule TAKE 1 CAPSULE THREE TIMES A DAY      ferrous sulfate (IRON 325) 325 (65 Fe) MG tablet Take by mouth every morning (before breakfast)      irbesartan (AVAPRO) 300 MG tablet TAKE 1 TABLET NIGHTLY      latanoprost (XALATAN) 0.005 % ophthalmic solution Apply 1 drop to eye      metoprolol succinate (TOPROL XL) 50 MG extended release tablet Take 25 mg by mouth 2 times daily      pantoprazole (PROTONIX) 40 MG tablet TAKE 1 TABLET DAILY      tamsulosin (FLOMAX) 0.4 MG capsule Take 0.4 mg by mouth daily       No current facility-administered medications for this visit.      No Known Allergies    Past Medical History:   Diagnosis Date    Arrhythmia     atrial fib    Gastroesophageal reflux disease without esophagitis 3/6/2017    GERD (gastroesophageal reflux disease)     HTN (hypertension), benign 3/20/2017    Hypercholesterolemia     IFG (impaired fasting glucose) 3/2/2017    Iron deficiency anemia 2/27/2017    Irritable bowel syndrome with diarrhea 7/29/2022    Mixed hyperlipidemia 2/27/2017     Past Surgical History:   Procedure Laterality Date    APPENDECTOMY      PACEMAKER       Family History   Problem Relation Age of Onset    Diabetes Maternal Grandmother     Cancer Mother         breast     Cancer Sister         breast    Diabetes Sister     Dementia Mother     No Known Problems Brother     Heart Disease Father         CHF    Hypertension Mother     Cancer Sister         breast     Social History     Tobacco Use    Smoking status: Never    Smokeless tobacco: Never   Substance Use Topics    Alcohol use: Yes       ROS:  A comprehensive review of systems was performed with the pertinent positives and negatives as noted in the HPI in addition to:  Review of Systems   Constitutional: Negative. HENT: Negative. Eyes: Negative. Respiratory: Negative. Cardiovascular: Negative. Gastrointestinal: Negative. Endocrine: Negative. Genitourinary: Negative. Musculoskeletal: Negative. Skin: Negative. Allergic/Immunologic: Negative. Neurological: Negative. Hematological: Negative. Psychiatric/Behavioral: Negative. All other systems reviewed and are negative. PHYSICAL EXAM:   /86   Pulse 76   Ht 6' (1.829 m)   Wt 213 lb (96.6 kg)   BMI 28.89 kg/m²      Wt Readings from Last 3 Encounters:   08/23/22 213 lb (96.6 kg)   08/04/22 210 lb (95.3 kg)   07/29/22 210 lb (95.3 kg)     BP Readings from Last 3 Encounters:   08/23/22 128/86   07/29/22 124/70   06/23/22 115/80       Gen: Well appearing, well developed, no acute distress  Eyes: Pupils equal, round.  Extraocular movements are intact  ENT: Oropharynx clear, no oral lesions, normal dentition  CV: S1S2, regular rate and rhythm, no murmurs, rubs or gallops, normal JVD, no carotid bruits, normal distal pulses, no NIC  Pulm: Clear to auscultation bilaterally, no accessory muscle uses, no wheezes or rales  GI: Soft, NT, ND, +BS  Neuro: Alert and oriented, nonfocal  Psych: Appropriate affect  Skin: Normal color and skin turgor  MSK: Normal muscle bulk and tone    Medical problems and test results were reviewed with the patient today. No results found for any visits on 08/23/22. bed rails

## 2022-08-28 ENCOUNTER — LABORATORY RESULT (OUTPATIENT)
Age: 77
End: 2022-08-28

## 2022-09-21 NOTE — PRE-OP CHECKLIST - VERIFY SURGICAL SITE/SIDE WITH PATIENT
done 88M with HTN, HLD, hx of CVA (no residual deficits), BPH, hx of skin CA who presents to [facility] on 8/11 with cough and fevers.  Symptoms started about 5-6 days PTA.  Started with left sided upper back pain.  Then started to have a cough + green phlegm and possible blood.  Associated with SOB and DONG.  No weight changes or night sweats.  Had a fever as high as 102F.  Some nausea but no vomiting with loss of appetite.  No chest pain.  No diarrhea.  No abdominal pain.  No recent travel or sick contacts.  Went to see his PMD 3 days prior to [facility] admission and was given a z-kellie and tesslon perles.  Reported having negative COVID home tests.   In the [facility] ED, patient's triage vitals were /78    RR  21, 93% on RA (thought dropped down to 88%) and T 98.5F.  Labs showed WBC 18 with a left shift, CINDI with BUN/Cr 40/1.88, elevated LFTs, normal lactate, negative COVID (last booster in 12/2021 with Pfizer vaccine), neg influenza, neg RSV.  CT chest showed "patchy ground glass and more dense opacities in both lungs, suspicious for multifocal PNA + several small lucencies with areas of lung opacity ->>may represent early cavitation versus areas of focal bronchiectasis."  Patient started on azithromycin and ceftriaxone empirically for multifocal PNA.  Also of note, patient was found to have new afib on EKG.  Patient denies any afib history but said when he was younger he did feel some irregular heartbeats.  Admitted for sepsis due to multifocal pneumonia with progression to acute hypoxic respiratory failure now on HFNC and course further c/b new onset afib. Patient was seen by ID, pulm and cardiology. Transferred to [facility] (8/29) for bronchoscopy.     Underwent bronchoscopy for bronchoalveolar lavage. Started on eliquis for new onset afib after bronchoscopy per cardiology, and bronchial, sputum cultures were followed. ID +Pulm consulted for TB work up. Bronchial cultures were negative on AFB stain along with sputum cultures. 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 Isolation protocol was discontinued. Bronchial fungal cultures showed normal alexander. Pt found to be in mild CINDI, was given a bolus of fluid. Pt encouraged to drink plenty of fluids.     Pt was [facility] Admitted with gait instabilty, ADL, and functional impairments.     Rehab Course significant for:  - 1 episode of desaturation during PT on day 1 of rehab.  No more incident.  Has been compliant with deep breathing exercises  - ear congestion (unchanged from acute hospitalization) - seen by ENT. Likely Presbycusis - Referred to outpatient for hearing test.      All other medical co-morbidities were stable.     IDT 9/19  lives alone in a ranch style home, 4 [address].  Daughter lives near.  was independent PTA.  Has RW at home  Functional Status: overall mod I with ADLs - but has poor endurance and fatigues easily, shower transfer SV, close SV with transfers/amb 100' + verbal cues/8 steps + 2HR step to step pattern  Goals: mod I, may need some assistant with IADLs  TDD: 9/24 home     Pt tolerated course of inpatient PT/OT rehab with significant functional improvements and met rehab goals prior to discharge.    Pt was medically cleared on 9/23 for discharged to home       88M with HTN, HLD, hx of CVA (no residual deficits), BPH, hx of skin CA who presents to [place] on 8/11 with cough and fevers.  Symptoms started about 5-6 days PTA.  Started with left sided upper back pain.  Then started to have a cough + green phlegm and possible blood.  Associated with SOB and DOGN.  No weight changes or night sweats.  Had a fever as high as 102F.  Some nausea but no vomiting with loss of appetite.  No chest pain.  No diarrhea.  No abdominal pain.  No recent travel or sick contacts.  Went to see his PMD 3 days prior to [facility] admission and was given a z-kellie and tesslon perles.  Reported having negative COVID home tests.   In the [place] [facility] ED, patient's triage vitals were /78    RR  21, 93% on RA (thought dropped down to 88%) and T 98.5F.  Labs showed WBC 18 with a left shift, CINDI with BUN/Cr 40/1.88, elevated LFTs, normal lactate, negative COVID (last booster in 12/2021 with Pfizer vaccine), neg influenza, neg RSV.  CT chest showed "patchy ground glass and more dense opacities in both lungs, suspicious for multifocal PNA + several small lucencies with areas of lung opacity ->>may represent early cavitation versus areas of focal bronchiectasis."  Patient started on azithromycin and ceftriaxone empirically for multifocal PNA.  Also of note, patient was found to have new afib on EKG.  Patient denies any afib history but said when he was younger he did feel some irregular heartbeats.  Admitted for sepsis due to multifocal pneumonia with progression to acute hypoxic respiratory failure now on HFNC and course further c/b new onset afib. Patient was seen by ID, pulm and cardiology. Transferred to [facility] (8/29) for bronchoscopy.     Underwent bronchoscopy for bronchoalveolar lavage. Started on eliquis for new onset afib after bronchoscopy per cardiology, and bronchial, sputum cultures were followed. ID +Pulm consulted for TB work up. Bronchial cultures were negative on AFB stain along with sputum cultures. 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 Isolation protocol was discontinued. Bronchial fungal cultures showed normal alexander. Pt found to be in mild CINDI, was given a bolus of fluid. Pt encouraged to drink plenty of fluids.     Pt was stable upon rehab admission [facility]. Admitted with gait instabilty, ADL, and functional impairments.     Rehab Course significant for:  - 1 episode of desaturation during PT on day 1 of rehab.  No more incident.  Has been compliant with deep breathing exercises  - ear congestion (unchanged from acute hospitalization) - seen by ENT. Likely Presbycusis - Referred to outpatient for hearing test.    -Leg edema resolved after discontinuing amlodipine, lasix continued to be on hold  -No longer required oxy.   Made functional progress, ambulating functional distance     All other medical co-morbidities were stable.     IDT 9/19  lives alone in a ranch style home, 4 [address].  Daughter lives near.  was independent PTA.  Has RW at home  Functional Status: overall mod I with ADLs - but has poor endurance and fatigues easily, shower transfer SV, close SV with transfers/amb 100' + verbal cues/8 steps + 2HR step to step pattern  Goals: mod I, may need some assistant with IADLs  TDD: 9/24 home     Pt tolerated course of inpatient PT/OT rehab with significant functional improvements and met rehab goals prior to discharge.    Pt was medically cleared on 9/23 for discharged to home

## 2022-09-30 ENCOUNTER — LABORATORY RESULT (OUTPATIENT)
Age: 77
End: 2022-09-30

## 2022-10-18 ENCOUNTER — NON-APPOINTMENT (OUTPATIENT)
Age: 77
End: 2022-10-18

## 2022-10-23 ENCOUNTER — RX RENEWAL (OUTPATIENT)
Age: 77
End: 2022-10-23

## 2022-11-04 ENCOUNTER — NON-APPOINTMENT (OUTPATIENT)
Age: 77
End: 2022-11-04

## 2022-11-04 ENCOUNTER — APPOINTMENT (OUTPATIENT)
Dept: INTERNAL MEDICINE | Facility: CLINIC | Age: 77
End: 2022-11-04

## 2022-11-04 VITALS
SYSTOLIC BLOOD PRESSURE: 150 MMHG | HEIGHT: 62 IN | TEMPERATURE: 97.1 F | DIASTOLIC BLOOD PRESSURE: 88 MMHG | HEART RATE: 54 BPM | WEIGHT: 235 LBS | OXYGEN SATURATION: 80 % | BODY MASS INDEX: 43.24 KG/M2

## 2022-11-04 VITALS — SYSTOLIC BLOOD PRESSURE: 130 MMHG | DIASTOLIC BLOOD PRESSURE: 74 MMHG

## 2022-11-04 DIAGNOSIS — Z79.01 LONG TERM (CURRENT) USE OF ANTICOAGULANTS: ICD-10-CM

## 2022-11-04 DIAGNOSIS — M54.2 CERVICALGIA: ICD-10-CM

## 2022-11-04 DIAGNOSIS — Z23 ENCOUNTER FOR IMMUNIZATION: ICD-10-CM

## 2022-11-04 DIAGNOSIS — R00.1 BRADYCARDIA, UNSPECIFIED: ICD-10-CM

## 2022-11-04 PROCEDURE — 93000 ELECTROCARDIOGRAM COMPLETE: CPT | Mod: 59

## 2022-11-04 PROCEDURE — G0444 DEPRESSION SCREEN ANNUAL: CPT | Mod: 59

## 2022-11-04 PROCEDURE — 90662 IIV NO PRSV INCREASED AG IM: CPT

## 2022-11-04 PROCEDURE — G0008: CPT

## 2022-11-04 PROCEDURE — 36415 COLL VENOUS BLD VENIPUNCTURE: CPT

## 2022-11-04 PROCEDURE — 99397 PER PM REEVAL EST PAT 65+ YR: CPT | Mod: 25

## 2022-11-04 RX ORDER — NEBIVOLOL 5 MG/1
5 TABLET ORAL
Qty: 90 | Refills: 1 | Status: DISCONTINUED | COMMUNITY
Start: 2018-10-18 | End: 2022-11-04

## 2022-11-04 NOTE — ED PROVIDER NOTE - OBJECTIVE STATEMENT
73 yo F w/ pmh HTN, HLD, asthma, DVT, PE s/p keith 2005 (coumadin), b-thal p/w worsening dry cough, sob at rest, chest pressure x 1 day. Pt took 2 advair treatments at home w/out relief; still feeling sx in room now. Reports chronic unchanged LE swelling, denies h/o CHF. Ambulating w/ walker at home. Denies fever, n/v/d/c, dysuria, bloody urine/stools. Pt went to urgent care today, but was told to go straight to ED after gave her history. Unsure about last time had echo.     PCP: Dr. Kelly (?) Shu 73 yo F w/ pmh HTN, HLD, asthma, DVT, PE s/p keith 2005 (coumadin), b-thal p/w worsening dry cough, sob at rest, chest pressure x 1 day. Pt took 2 advair treatments at home w/out relief; still feeling sx in room now. Reports chronic unchanged LE swelling, denies h/o CHF. Ambulating w/ walker at home. Denies fever, n/v/d/c, dysuria, bloody urine/stools. Pt went to urgent care today, but was told to go straight to ED after gave her history. Unsure about last time had echo.     PCP: Dr. Yehuda Ireland [phone] 71 yo F w/ pmh HTN, HLD, asthma, DVT, PE s/p keith 2005 (coumadin), b-thal p/w worsening dry cough, sob at rest, chest pressure x 1 day. Pt took 2 advair treatments at home w/out relief; still feeling sx in room now. Reports chronic unchanged LE swelling, denies h/o CHF. Ambulating w/ walker at home. Denies fever, n/v/d/c, dysuria, bloody urine/stools. Pt went to urgent care today, but was told to go straight to ED after gave her history. Unsure about last time had echo.     PCP: Dr. Yehuda Ireland [phone]  Does not have a cardiologist. No

## 2022-11-05 ENCOUNTER — MED ADMIN CHARGE (OUTPATIENT)
Age: 77
End: 2022-11-05

## 2022-11-05 PROBLEM — Z79.01 CURRENT USE OF LONG TERM ANTICOAGULATION: Status: ACTIVE | Noted: 2018-08-09

## 2022-11-05 PROBLEM — R00.1 SINUS BRADYCARDIA: Status: ACTIVE | Noted: 2022-11-05

## 2022-11-05 PROBLEM — Z23 INFLUENZA VACCINE NEEDED: Status: ACTIVE | Noted: 2022-11-05

## 2022-11-05 PROBLEM — M54.2 NECK PAIN: Status: ACTIVE | Noted: 2022-11-05

## 2022-11-05 RX ORDER — AMOXICILLIN 500 MG/1
500 TABLET, FILM COATED ORAL
Qty: 30 | Refills: 0 | Status: DISCONTINUED | COMMUNITY
Start: 2022-09-15

## 2022-11-05 RX ORDER — CHLORHEXIDINE GLUCONATE, 0.12% ORAL RINSE 1.2 MG/ML
0.12 SOLUTION DENTAL
Qty: 473 | Refills: 0 | Status: DISCONTINUED | COMMUNITY
Start: 2022-09-15

## 2022-11-05 RX ORDER — IBUPROFEN 800 MG/1
800 TABLET, FILM COATED ORAL
Qty: 30 | Refills: 0 | Status: DISCONTINUED | COMMUNITY
Start: 2022-05-31

## 2022-11-08 LAB
ALBUMIN SERPL ELPH-MCNC: 4.3 G/DL
ALP BLD-CCNC: 100 U/L
ALT SERPL-CCNC: 9 U/L
ANION GAP SERPL CALC-SCNC: 12 MMOL/L
AST SERPL-CCNC: 20 U/L
BASOPHILS # BLD AUTO: 0.04 K/UL
BASOPHILS NFR BLD AUTO: 0.9 %
BILIRUB SERPL-MCNC: 0.3 MG/DL
BUN SERPL-MCNC: 12 MG/DL
CALCIUM SERPL-MCNC: 9.3 MG/DL
CHLORIDE SERPL-SCNC: 102 MMOL/L
CHOLEST SERPL-MCNC: 152 MG/DL
CO2 SERPL-SCNC: 25 MMOL/L
CREAT SERPL-MCNC: 0.65 MG/DL
EGFR: 92 ML/MIN/1.73M2
EOSINOPHIL # BLD AUTO: 0.05 K/UL
EOSINOPHIL NFR BLD AUTO: 1.2 %
ESTIMATED AVERAGE GLUCOSE: 120 MG/DL
GLUCOSE SERPL-MCNC: 98 MG/DL
HBA1C MFR BLD HPLC: 5.8 %
HCT VFR BLD CALC: 36.7 %
HDLC SERPL-MCNC: 73 MG/DL
HGB BLD-MCNC: 11.5 G/DL
IMM GRANULOCYTES NFR BLD AUTO: 0.2 %
INR PPP: 2.68 RATIO
LDLC SERPL CALC-MCNC: 67 MG/DL
LYMPHOCYTES # BLD AUTO: 1.45 K/UL
LYMPHOCYTES NFR BLD AUTO: 33.9 %
MAN DIFF?: NORMAL
MCHC RBC-ENTMCNC: 22.3 PG
MCHC RBC-ENTMCNC: 31.3 GM/DL
MCV RBC AUTO: 71.1 FL
MONOCYTES # BLD AUTO: 0.43 K/UL
MONOCYTES NFR BLD AUTO: 10 %
NEUTROPHILS # BLD AUTO: 2.3 K/UL
NEUTROPHILS NFR BLD AUTO: 53.8 %
NONHDLC SERPL-MCNC: 79 MG/DL
PLATELET # BLD AUTO: 320 K/UL
POTASSIUM SERPL-SCNC: 4.3 MMOL/L
PROT SERPL-MCNC: 6.9 G/DL
PT BLD: 31.4 SEC
RBC # BLD: 5.16 M/UL
RBC # FLD: 17.7 %
SODIUM SERPL-SCNC: 139 MMOL/L
TRIGL SERPL-MCNC: 62 MG/DL
TSH SERPL-ACNC: 1.15 UIU/ML
WBC # FLD AUTO: 4.28 K/UL

## 2022-12-06 ENCOUNTER — NON-APPOINTMENT (OUTPATIENT)
Age: 77
End: 2022-12-06

## 2022-12-08 ENCOUNTER — LABORATORY RESULT (OUTPATIENT)
Age: 77
End: 2022-12-08

## 2022-12-14 ENCOUNTER — APPOINTMENT (OUTPATIENT)
Dept: PULMONOLOGY | Facility: CLINIC | Age: 77
End: 2022-12-14

## 2022-12-14 VITALS
OXYGEN SATURATION: 98 % | HEART RATE: 74 BPM | HEIGHT: 62 IN | SYSTOLIC BLOOD PRESSURE: 113 MMHG | BODY MASS INDEX: 44.16 KG/M2 | DIASTOLIC BLOOD PRESSURE: 74 MMHG | TEMPERATURE: 98.1 F | WEIGHT: 240 LBS

## 2022-12-14 DIAGNOSIS — R49.0 DYSPHONIA: ICD-10-CM

## 2022-12-14 PROCEDURE — 99214 OFFICE O/P EST MOD 30 MIN: CPT

## 2022-12-14 NOTE — HISTORY OF PRESENT ILLNESS
[Never] : never [TextBox_4] : 74 yo F with a h/o Asthma who presents for follow up.\par \par 8/25/21: Initial evaluation and to establish care.\par Has been following with Dr. 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 Taylor Flanagan\par Current inhalers: Wixela 250/50 BID for at least 1+years, Albuterol \par In the past has taken Advair as well.\par Symptoms have been controlled, using Albuterol infrequently. Feels dyspneic on exertion on hot/humid days\par Triggers: Humidity, Perfumes, tobacco smoke \par Last steroid use: Prednisone approx 2 years ago\par \par PFTs today: normal spirometry, lung volumes and DLCO (borderline restriction/reduced DLCO - likely from obesity)\par \par CT chest: 10/3/2019\par INTERPRETATION: CLINICAL INFORMATION: Pulmonary nodule follow-up \par COMPARISON: CT chest from 6/11/2019 \par LUNGS AND AIRWAYS: Patent central airways. Unchanged pulmonary nodules compared to prior CT chest from 6/11/2019.\par Two right middle lobe adjacent nodules measuring 6 and 4 mm with adjacent groundglass \par opacity. Other lung nodules measure up to 7 mm are unchanged. \par \par PLEURA: No pleural effusion. \par \par MEDIASTINUM AND CAILIN: No lymphadenopathy. \par \par VESSELS: Within normal limits. \par \par HEART: Heart size is normal. No pericardial effusion. \par \par CHEST WALL AND LOWER NECK: Within normal limits. \par \par VISUALIZED UPPER ABDOMEN: Patchy hypodensities within the liver unchanged \par compared to prior CT chest from 6/11/2019. \par \par BONES: Degenerative changes \par \par Quality metrics:\par Tobacco use - never\par ESS - NA\par \par Vaccines: \par COVID: yes completed in April 2021\par Influenza: receives annually \par Pneumococcal: received 5/2016\par \par \par Follow up OV 12/7/2021:\par Doing well. Received her Influenza vaccine in September.\par Asthma - well controlled, using Wixela, has not needed Albuterol\par Lung nodules - did not have repeat CT chest, had a CT abdomen and the lower lung fields appeared unchanged \par \par Follow up OV 3/30/22:\par Asthma - well controlled, using Wixela regularly, not requiring Albuterol \par Lung nodules - in a nonsmoker, have been stable from [id number], likely post infectious\par CT chest 12/27/21: Mild tree-in bud opacity within the lingula, unchanged. Few small fissural triangular nodules and stable beginning intrapulmonary lymph node. New 6 mm subpleural opacity within lingula adjacent to the fissure, likely focus of nodular atelectasis. \par \par Follow up OV 12/14/22:\par Asthma- using Fluticasone/Salmeterol BID, \par Recent URI the past few weeks, needing to use Ventolin a few times a day for tightness\par Otherwise asthma has been controlled\par Complains of voice hoarseness for 6 months - has been using Flonase in the morning, occasional throat clearing \par Occasional chest palpitations - has felt it during the day a few times and when laying in bed at night. Seeing Cardiologist later today\par \par Vaccines:\par Up to date the COVID x2 and 3 boosters, Influenza and PCV. \par

## 2022-12-14 NOTE — CONSULT LETTER
[Dear  ___] : Dear  [unfilled], [Courtesy Letter:] : I had the pleasure of seeing your patient, [unfilled], in my office today. [Please see my note below.] : Please see my note below. [Consult Closing:] : Thank you very much for allowing me to participate in the care of this patient.  If you have any questions, please do not hesitate to contact me. [Sincerely,] : Sincerely, [FreeTextEntry2] : Dr. Masha Cruz [FreeTextEntry3] : Mayela Billings MD\par  of Medicine\par Associate , Fellowship in Pulmonary and Critical Care Medicine\par Division of Pulmonary, Critical Care & Sleep Medicine\par [employer] at [facility]\par \par \par

## 2022-12-14 NOTE — ASSESSMENT
[FreeTextEntry1] : 1. Asthma - well controlled, c/w Fluticasone/Salmeterol and Albuterol prn- sent prescriptions to [facility] pharmacy\par \par 2. Voice hoarseness x6 months\par Increase Flonase to 2 sprays BID\par ENT referral provided\par CT chest \par \par 3. Health maintenance- Up to date with vaccines\par \par Follow up in 3-4 months\par \par

## 2022-12-14 NOTE — PHYSICAL EXAM
[No Acute Distress] : no acute distress [Low Lying Soft Palate] : low lying soft palate [II] : Mallampati Class: II [Normal Appearance] : normal appearance [No Neck Mass] : no neck mass [Normal Rate/Rhythm] : normal rate/rhythm [Normal S1, S2] : normal s1, s2 [No Murmurs] : no murmurs [No Resp Distress] : no resp distress [Clear to Auscultation Bilaterally] : clear to auscultation bilaterally [No Abnormalities] : no abnormalities [Benign] : benign [Normal Gait] : normal gait [No Clubbing] : no clubbing [No Cyanosis] : no cyanosis [No Edema] : no edema [FROM] : FROM [Normal Color/ Pigmentation] : normal color/ pigmentation [No Focal Deficits] : no focal deficits [Oriented x3] : oriented x3 [Normal Affect] : normal affect [TextBox_11] : no errythema

## 2023-01-08 ENCOUNTER — LABORATORY RESULT (OUTPATIENT)
Age: 78
End: 2023-01-08

## 2023-01-23 ENCOUNTER — APPOINTMENT (OUTPATIENT)
Dept: CT IMAGING | Facility: IMAGING CENTER | Age: 78
End: 2023-01-23
Payer: MEDICARE

## 2023-01-23 ENCOUNTER — OUTPATIENT (OUTPATIENT)
Dept: OUTPATIENT SERVICES | Facility: HOSPITAL | Age: 78
LOS: 1 days | End: 2023-01-23
Payer: COMMERCIAL

## 2023-01-23 ENCOUNTER — RX RENEWAL (OUTPATIENT)
Age: 78
End: 2023-01-23

## 2023-01-23 DIAGNOSIS — Z96.651 PRESENCE OF RIGHT ARTIFICIAL KNEE JOINT: Chronic | ICD-10-CM

## 2023-01-23 DIAGNOSIS — R49.0 DYSPHONIA: ICD-10-CM

## 2023-01-23 DIAGNOSIS — Z98.89 OTHER SPECIFIED POSTPROCEDURAL STATES: Chronic | ICD-10-CM

## 2023-01-23 DIAGNOSIS — I83.93 ASYMPTOMATIC VARICOSE VEINS OF BILATERAL LOWER EXTREMITIES: Chronic | ICD-10-CM

## 2023-01-23 DIAGNOSIS — R91.8 OTHER NONSPECIFIC ABNORMAL FINDING OF LUNG FIELD: ICD-10-CM

## 2023-01-23 DIAGNOSIS — Z98.890 OTHER SPECIFIED POSTPROCEDURAL STATES: Chronic | ICD-10-CM

## 2023-01-23 DIAGNOSIS — Z90.49 ACQUIRED ABSENCE OF OTHER SPECIFIED PARTS OF DIGESTIVE TRACT: Chronic | ICD-10-CM

## 2023-01-23 DIAGNOSIS — Z98.1 ARTHRODESIS STATUS: Chronic | ICD-10-CM

## 2023-01-23 DIAGNOSIS — R68.89 OTHER GENERAL SYMPTOMS AND SIGNS: ICD-10-CM

## 2023-01-23 PROCEDURE — 71250 CT THORAX DX C-: CPT

## 2023-01-23 PROCEDURE — 71250 CT THORAX DX C-: CPT | Mod: 26

## 2023-01-23 RX ORDER — FLUTICASONE PROPIONATE 50 UG/1
50 SPRAY, METERED NASAL
Qty: 1 | Refills: 0 | Status: ACTIVE | COMMUNITY
Start: 2023-01-23 | End: 1900-01-01

## 2023-01-23 RX ORDER — OLOPATADINE HYDROCHLORIDE OPHTHALMIC 2 MG/ML
0.2 SOLUTION OPHTHALMIC DAILY
Qty: 1 | Refills: 0 | Status: ACTIVE | COMMUNITY
Start: 2023-01-23 | End: 1900-01-01

## 2023-01-27 ENCOUNTER — NON-APPOINTMENT (OUTPATIENT)
Age: 78
End: 2023-01-27

## 2023-01-31 PROBLEM — L60.3 DYSTROPHIC NAIL: Status: ACTIVE | Noted: 2023-01-31

## 2023-01-31 PROBLEM — Z86.19 HISTORY OF ONYCHOMYCOSIS: Status: RESOLVED | Noted: 2019-09-26 | Resolved: 2023-01-31

## 2023-01-31 PROBLEM — B35.1 ONYCHOMYCOSIS: Status: ACTIVE | Noted: 2023-01-31

## 2023-02-01 ENCOUNTER — APPOINTMENT (OUTPATIENT)
Dept: DERMATOLOGY | Facility: CLINIC | Age: 78
End: 2023-02-01
Payer: MEDICARE

## 2023-02-01 VITALS — WEIGHT: 233 LBS | HEIGHT: 62 IN | BODY MASS INDEX: 42.88 KG/M2

## 2023-02-01 DIAGNOSIS — B35.1 TINEA UNGUIUM: ICD-10-CM

## 2023-02-01 DIAGNOSIS — B07.8 OTHER VIRAL WARTS: ICD-10-CM

## 2023-02-01 DIAGNOSIS — L60.3 NAIL DYSTROPHY: ICD-10-CM

## 2023-02-01 DIAGNOSIS — Z86.19 PERSONAL HISTORY OF OTHER INFECTIOUS AND PARASITIC DISEASES: ICD-10-CM

## 2023-02-01 PROCEDURE — 17110 DESTRUCTION B9 LES UP TO 14: CPT

## 2023-02-01 PROCEDURE — 99204 OFFICE O/P NEW MOD 45 MIN: CPT | Mod: 25

## 2023-02-01 RX ORDER — KETOCONAZOLE 20 MG/G
2 CREAM TOPICAL TWICE DAILY
Qty: 1 | Refills: 3 | Status: ACTIVE | COMMUNITY
Start: 2023-02-01 | End: 1900-01-01

## 2023-02-01 NOTE — ASSESSMENT
[FreeTextEntry1] : nail dystrophy\par onychomycosis\par defers PO; SED\par ketocream BID; SED\par prognosis discussed\par \par flat warts, DPNs\par -treated with cautery at setting of 2.5; SED including burning, scarring, and incomplete tx. patient verbalized understanding\par DPNs to be treated cosmetically at [facility] per session; SED

## 2023-02-06 ENCOUNTER — RX RENEWAL (OUTPATIENT)
Age: 78
End: 2023-02-06

## 2023-02-09 ENCOUNTER — LABORATORY RESULT (OUTPATIENT)
Age: 78
End: 2023-02-09

## 2023-02-13 ENCOUNTER — LABORATORY RESULT (OUTPATIENT)
Age: 78
End: 2023-02-13

## 2023-03-11 ENCOUNTER — EMERGENCY (EMERGENCY)
Facility: HOSPITAL | Age: 78
LOS: 1 days | Discharge: HOME | End: 2023-03-11
Attending: STUDENT IN AN ORGANIZED HEALTH CARE EDUCATION/TRAINING PROGRAM | Admitting: STUDENT IN AN ORGANIZED HEALTH CARE EDUCATION/TRAINING PROGRAM
Payer: MEDICARE

## 2023-03-11 VITALS
TEMPERATURE: 97 F | DIASTOLIC BLOOD PRESSURE: 78 MMHG | HEART RATE: 72 BPM | SYSTOLIC BLOOD PRESSURE: 159 MMHG | OXYGEN SATURATION: 100 % | RESPIRATION RATE: 20 BRPM

## 2023-03-11 VITALS
TEMPERATURE: 98 F | RESPIRATION RATE: 18 BRPM | DIASTOLIC BLOOD PRESSURE: 84 MMHG | OXYGEN SATURATION: 100 % | SYSTOLIC BLOOD PRESSURE: 156 MMHG | HEART RATE: 69 BPM

## 2023-03-11 DIAGNOSIS — Z98.89 OTHER SPECIFIED POSTPROCEDURAL STATES: Chronic | ICD-10-CM

## 2023-03-11 DIAGNOSIS — Z98.890 OTHER SPECIFIED POSTPROCEDURAL STATES: Chronic | ICD-10-CM

## 2023-03-11 DIAGNOSIS — I83.93 ASYMPTOMATIC VARICOSE VEINS OF BILATERAL LOWER EXTREMITIES: Chronic | ICD-10-CM

## 2023-03-11 DIAGNOSIS — Z98.1 ARTHRODESIS STATUS: Chronic | ICD-10-CM

## 2023-03-11 DIAGNOSIS — Z96.651 PRESENCE OF RIGHT ARTIFICIAL KNEE JOINT: Chronic | ICD-10-CM

## 2023-03-11 DIAGNOSIS — Z90.49 ACQUIRED ABSENCE OF OTHER SPECIFIED PARTS OF DIGESTIVE TRACT: Chronic | ICD-10-CM

## 2023-03-11 PROCEDURE — 73120 X-RAY EXAM OF HAND: CPT | Mod: 26,RT

## 2023-03-11 PROCEDURE — 99284 EMERGENCY DEPT VISIT MOD MDM: CPT

## 2023-03-11 RX ORDER — IBUPROFEN 200 MG
600 TABLET ORAL ONCE
Refills: 0 | Status: COMPLETED | OUTPATIENT
Start: 2023-03-11 | End: 2023-03-11

## 2023-03-11 RX ADMIN — Medication 600 MILLIGRAM(S): at 19:04

## 2023-03-11 NOTE — ED PROVIDER NOTE - PATIENT PORTAL LINK FT
You can access the FollowMyHealth Patient Portal offered by [facility] by registering at the following website: [email or web address]. By joining [facility]’s FollowMyHealth portal, you will also be able to view your health information using other applications (apps) compatible with our system.

## 2023-03-11 NOTE — ED PROVIDER NOTE - NSFOLLOWUPINSTRUCTIONS_ED_ALL_ED_FT
You were seen in the ER for hand pain. Your xray shows no fracture. You may have gout or arthritis causing pain. You can take tylenol. Please follow the instructions on the packaging. If you take tylenol, please limit intake to 1-2g per day. You can also use heating packs or lidocaine patches.    Please follow up with your primary care doctor.    Please return to the ER if you have worsening symptoms including fever, chest pain, shortness of breath, abdominal pain, nausea, vomiting, diarrhea, weakness or lightheadedness/fainting.

## 2023-03-11 NOTE — ED PROVIDER NOTE - ATTENDING CONTRIBUTION TO CARE
77F h/o asthma, HTN, OA, PMR, previous PEs and DVT on warfarin p/w R 3rd MCP pain/swelling/redness beginning ~11am. Denies trauma.  Denies fevers, chills, headache, chest pain, trouble breathing, abdominal pain, nausea/vomiting, diarrhea/constipation, dysuria, hematuria.  Patient denies taking any medications for pain prior to arrival to ED today. No h/o gout. R hand notable for swelling at third mcp with erythema. No warmth or tenderness. +decreased rom at mcp though otherwise with from. Appears c/w gout, though will get XRay to r/o fracture.

## 2023-03-11 NOTE — ED PROVIDER NOTE - PHYSICAL EXAMINATION
Constitutional: VS reviewed. Alert and orientedx3, well appearing, no apparent distress  HEENT: Atraumatic, EOMI  CV: RRR  Lungs: Clear and equal bilaterally, no wheezes, rales or crackles  Abdomen: Soft, nondistended, nontender  MSK: Left hand unremarkable. Right hand: swelling, erythema and TTP over 3rd MCP. Pt able to flex and extend affected digit. TTP over 3rd PIP. No open wounds or tracking of erythema. radial pulses 2+ b/l. Cap refill <2s  Skin: Warm and dry. As visualized no other rashes, lesions, bruising or erythema  Neuro: Strength 5/5 in all extremities. Sensation intact.   Lymph: No pitting edema in extremities.

## 2023-03-11 NOTE — ED PROVIDER NOTE - OBJECTIVE STATEMENT
76 y/o with PMHx of asthma, HTN, OA, polymyalgia rheumatica, previous PEs and DVT on warfarin presents to the ED for 1 day. Patient states that around 11 AM this morning she noticed pain in the right third MCP.  Patient states she has subsequent swelling to the area and difficulty opening up her hand due to the pain.  Patient denies this ever happening before or any history of gout.  Patient denies fevers, chills, headache, chest pain, trouble breathing, abdominal pain, nausea/vomiting, diarrhea/constipation, dysuria, hematuria.  Patient denies taking any medications for pain prior to arrival to ED today. Pt denies trauma to area.

## 2023-03-15 ENCOUNTER — NON-APPOINTMENT (OUTPATIENT)
Age: 78
End: 2023-03-15

## 2023-03-17 ENCOUNTER — LABORATORY RESULT (OUTPATIENT)
Age: 78
End: 2023-03-17

## 2023-03-18 NOTE — H&P PST ADULT - PRO ANTICIPATED DISCH DISP
Pt presents to ER w/ bilateral lower/lumbar back pain. Pt states the pain radiates to her groin. Pt has hx of chronic back pain   home w/ outpatient services

## 2023-03-21 ENCOUNTER — LABORATORY RESULT (OUTPATIENT)
Age: 78
End: 2023-03-21

## 2023-03-24 ENCOUNTER — LABORATORY RESULT (OUTPATIENT)
Age: 78
End: 2023-03-24

## 2023-03-31 ENCOUNTER — APPOINTMENT (OUTPATIENT)
Dept: ENDOCRINOLOGY | Facility: CLINIC | Age: 78
End: 2023-03-31
Payer: MEDICARE

## 2023-03-31 VITALS
TEMPERATURE: 97.7 F | BODY MASS INDEX: 43.24 KG/M2 | HEIGHT: 62 IN | OXYGEN SATURATION: 96 % | HEART RATE: 77 BPM | SYSTOLIC BLOOD PRESSURE: 152 MMHG | DIASTOLIC BLOOD PRESSURE: 90 MMHG | WEIGHT: 235 LBS

## 2023-03-31 DIAGNOSIS — R73.03 PREDIABETES.: ICD-10-CM

## 2023-03-31 PROCEDURE — 99204 OFFICE O/P NEW MOD 45 MIN: CPT

## 2023-03-31 NOTE — HISTORY OF PRESENT ILLNESS
[FreeTextEntry1] :  #Obesity \par - Onset of weight gain: entire life has been overweight\par FH: parents were not overweight, brother was overweight and lost weight \par \par Lowest adult weight: 147lbs about 30 years ago \par Prior attempts: weight watchers (currently on it)\par Current weight: 235lbs \par Target weight:160lbs \par \par ?pre-DM or DM: yes prediabetes \par \par \par 24 hr diet recall:\par Breakfast: oatmeal with fruit, coffee (decaf and no sugar and almond breeze vanilla) or boiled eggs \par other days- texas toast, applesauce \par Lunch: tuna + eggs + lettuce  \par other days- beans, turkey breast + string cheese + progresso soups\par Dinner: pork chops, chicken, denies eating hamburgers, fish/salmon, baked vegetables \par Snacks: everyday- snacks on donuts, brownies, ice cream \par \par Drinks: Avoids juices, drinks diet drinks \par Last meal time: 8pm. and sleeps out \par \par Orders out X times per week: 0 \par \par Met with a nutritionist? No \par Exercise regimen: Not regularly \par \par Denies fatigue, peripheral weakness, striae, hypertension or hyperglycemia.\par \par Denies change in size of hands or feet or change in jaw.\par \par No prior exposure to steroids, antipsychotics.\par \par Denies history of  CAD, hyperthyroidism, glaucoma, or seizures.\par \par Denies history of drug use including chronic opioid use.\par \par \par \par  \par \par  \par \par  Please follow up with your pediatrician in 1-2 days. If no appointment can be made, please follow up in the [facility] in 1-2 days. Call [phone] to set up an appointment.

## 2023-03-31 NOTE — ASSESSMENT
[FreeTextEntry1] : #Obesity \par #Prediabetes\par -Patient has been struggling with weight loss all her life.  She mentions that she has been on weight watchers on and off.  She mentions that when she is on weight watchers she does see some improvement however the moment she deviates from the weight watchers diet she starts gaining weight. \par -Patient also has a complication now of prediabetes that may be related to her weight\par Plan:\par -Discussed with patient the pathophysiology of obesity and the complications associated with it\par -We discussed nonpharmacological treatments including diet and exercise.  Offered patient to see the nutritionist in our office to gain further insight into changes in her diet\par -We also discussed pharmacological treatments that are approved for weight loss and the GLP-1 agonists class.  More specifically, we discussed about Saxenda and Wegovy.  Patient will find out from her insurance company if these medications are approved, and if they are she will reach out to us. \par \par

## 2023-04-11 ENCOUNTER — APPOINTMENT (OUTPATIENT)
Dept: OPHTHALMOLOGY | Facility: CLINIC | Age: 78
End: 2023-04-11
Payer: MEDICARE

## 2023-04-11 ENCOUNTER — NON-APPOINTMENT (OUTPATIENT)
Age: 78
End: 2023-04-11

## 2023-04-11 PROCEDURE — 92134 CPTRZ OPH DX IMG PST SGM RTA: CPT

## 2023-04-11 PROCEDURE — 99214 OFFICE O/P EST MOD 30 MIN: CPT

## 2023-04-24 ENCOUNTER — LABORATORY RESULT (OUTPATIENT)
Age: 78
End: 2023-04-24

## 2023-04-28 NOTE — DIETITIAN INITIAL EVALUATION ADULT. - WEIGHT IN LBS
Tylenol dose = 140 mg  = half way between the 3.75 ml and 5 ml lines; ibuprofen dose = 75 mg (3.75 ml of children's strength or 1.875 ml of infant strength)  Should be off the bottle now    Whole milk recommended - 12-18 oz per day typical; believe it or not, most studies comparing whole and 2% milk show whole milk better (healthier weight and better blood test numbers)    If your child received Varicella (chicken pox) vaccine today, note that it can sometimes cause a fever and rash 7-14 days after injection (in addition to some fever in the first 48 hours). This is nothing to worry about. You can treat fever if it bothers your child but no treatment is needed for the rash. They are not contagious during this time. Fever will last on average 3-4 days but the rash can last up to a week. Let us know if fever were to last 5 days or more.      See back at 22 mo of age for next well visit - July 24 or after [phone]

## 2023-05-07 NOTE — ED ADULT TRIAGE NOTE - CADM TRG TX PRIOR TO ARRIVAL
Medicine Progress Note  --------------------  Neo Malave M.D.  Internal Medicine/Emergency Medicine  PGY-2  --------------------      Patient: LIZ HOUSER, MRN: [id number], : 1974  Admitted on 23 for Nausea and vomiting      LANGUAGE - English ]OR[ PI (_): #                ------------------------------------------------------------------------------------------------------------  SUMMARY (from last progress note through 23 @ 07:47):   -  ------------------------------------------------------------------------------------------------------------  OVERNIGHT EVENTS  NAEON    SUBJECTIVE  -       ROS negative unless noted above.  ------------------------------------------------------------------------------------------------------------  OBJECTIVE:  Physical Exam  GEN: Awake, AOx3, NAD.  HEENT: NCAT  ---EYES: no scleral icterus, EOMI, PERRLA  CARDIO: RRR. Normal S1/S2, no m/r/g. No JVD.  RESP: CTAB  ABD: Soft, NTND. BS+. No masses, no hepatosplenomegaly.  : No CVAT.   MSK: No obvious deformity or ROM deficit. 2+ pulses x4. No edema.  SKIN: Warm, dry. No rashes. Nail beds without cyanosis or clubbing.  NEURO: Moves all four extremities spontaneously  PSYCH: Appropriate mood & affect. No VH/AH. No SI/HI.      Vital Signs Last 24 Hrs  T(F): 97.6, Max: 98.1 (23 @ 21:20)  HR: 65 (62 - 65)  BP: 117/73 (106/60 - 121/66)  RR: 17 (17 - 17)  SpO2: 100% (99% - 100%)        DAILY MEASUREMENTS:  I&O's Summary    06 May 2023 07:01  -  07 May 2023 07:00  --------------------------------------------------------  IN: 650 mL / OUT: 550 mL / NET: 100 mL      Daily     Daily   Weight (kg): 62.2 (23 @ 03:50)  Orthostatic VS        LABS:                        7.4    10.35 )-----------( 307      ( 07 May 2023 06:35 )             23.4     Hgb Trend: 7.4<--, 7.8<--, 8.9<--, 8.0<--, 7.7<--      139  |  110<H>  |  63<H>  ----------------------------<  104<H>  4.6   |  17<L>  |  2.38<H>    Ca    8.5      06 May 2023 06:30  Phos  6.0       Mg     1.70         TPro  4.4<L>  /  Alb  2.4<L>  /  TBili  <0.2  /  DBili  x   /  AST  11  /  ALT  8   /  AlkPhos  50        CAPILLARY BLOOD GLUCOSE            Urinalysis Basic - ( 05 May 2023 13:52 )    Color: Light Yellow / Appearance: Clear / S.011 / pH: x  Gluc: x / Ketone: Negative  / Bili: Negative / Urobili: <2 mg/dL   Blood: x / Protein: 100 mg/dL / Nitrite: Negative   Leuk Esterase: Negative / RBC: 10 /HPF / WBC 4 /HPF   Sq Epi: x / Non Sq Epi: x / Bacteria: Occasional          Venous Blood Gas:  23 @ 06:35  7.35/37/82/20/96.9  VBG Lactate: 1.5      RADIOLOGY & ADDITIONAL TESTS:  Results Reviewed:     Imaging Reviewed:  Electrocardiogram Reviewed:      ------------------------------------------------------------------------------------------------------------  MEDICATIONS  (STANDING):  ergocalciferol 20362 Unit(s) Oral <User Schedule>  folic acid 1 milliGRAM(s) Oral daily  heparin   Injectable 5000 Unit(s) SubCutaneous every 8 hours  hydroxychloroquine 200 milliGRAM(s) Oral <User Schedule>  hydroxychloroquine 400 milliGRAM(s) Oral <User Schedule>  methylPREDNISolone sodium succinate Injectable 60 milliGRAM(s) IV Push every 24 hours  pantoprazole    Tablet 40 milliGRAM(s) Oral before breakfast  sodium bicarbonate 1300 milliGRAM(s) Oral every 12 hours  trimethoprim   80 mG/sulfamethoxazole 400 mG 1 Tablet(s) Oral <User Schedule>    MEDICATIONS  (PRN):    ------------------------------------------------------------------------------------------------------------  COORDINATION OF CARE:  Care discussed with consultants/other providers and notes reviewed [Y]     none Medicine Progress Note  --------------------  Neo Malave M.D.  Internal Medicine/Emergency Medicine  PGY-2  --------------------      Patient: LIZ HOUSER, MRN: [id number], : 1974  Admitted on 23 for Nausea and vomiting    OVERNIGHT EVENTS  NAEON    SUBJECTIVE  Mr. Houser says that he feels fine this morning. He's curious about his urine protein & creatinine and is happy to know his creatinine has improved today. He denies any ongoing nausea/vomiting. He says that his swelling has improved as well.      ROS negative unless noted above.  ------------------------------------------------------------------------------------------------------------  OBJECTIVE:  Physical Exam  GEN: Awake, AOx3, NAD.  HEENT: NCAT  CARDIO: RRR. Normal S1/S2, no m/r/g. No JVD.  RESP: CTAB, no w/r/r  ABD: Soft, NTND.   MSK: No obvious deformity or ROM deficit. Tr edema near ankles b/l  SKIN: Warm, dry.   NEURO: Moves all four extremities spontaneously  PSYCH: Appropriate mood & affect.      Vital Signs Last 24 Hrs  T(F): 97.6, Max: 98.1 (23 @ 21:20)  HR: 65 (62 - 65)  BP: 117/73 (106/60 - 121/66)  RR: 17 (17 - 17)  SpO2: 100% (99% - 100%)        DAILY MEASUREMENTS:  I&O's Summary    06 May 2023 07:01  -  07 May 2023 07:00  --------------------------------------------------------  IN: 650 mL / OUT: 550 mL / NET: 100 mL      Daily     Daily   Weight (kg): 62.2 (23 @ 03:50)  Orthostatic VS        LABS:                        7.4    10.35 )-----------( 307      ( 07 May 2023 06:35 )             23.4     Hgb Trend: 7.4<--, 7.8<--, 8.9<--, 8.0<--, 7.7<--      139  |  110<H>  |  63<H>  ----------------------------<  104<H>  4.6   |  17<L>  |  2.38<H>    Ca    8.5      06 May 2023 06:30  Phos  6.0     05-  Mg     1.70     05-06    TPro  4.4<L>  /  Alb  2.4<L>  /  TBili  <0.2  /  DBili  x   /  AST  11  /  ALT  8   /  AlkPhos  50  05-06      CAPILLARY BLOOD GLUCOSE            Urinalysis Basic - ( 05 May 2023 13:52 )    Color: Light Yellow / Appearance: Clear / S.011 / pH: x  Gluc: x / Ketone: Negative  / Bili: Negative / Urobili: <2 mg/dL   Blood: x / Protein: 100 mg/dL / Nitrite: Negative   Leuk Esterase: Negative / RBC: 10 /HPF / WBC 4 /HPF   Sq Epi: x / Non Sq Epi: x / Bacteria: Occasional          Venous Blood Gas:  23 @ 06:35  7.35/37/82/20/96.9  VBG Lactate: 1.5      RADIOLOGY & ADDITIONAL TESTS:  Results Reviewed: No interval other labs   [ ] Still pending Mycophenolic Acid Level    Imaging Reviewed: No interval imaging    Electrocardiogram Reviewed: No interval ECG      ------------------------------------------------------------------------------------------------------------  MEDICATIONS  (STANDING):  ergocalciferol 33706 Unit(s) Oral <User Schedule>  folic acid 1 milliGRAM(s) Oral daily  heparin   Injectable 5000 Unit(s) SubCutaneous every 8 hours  hydroxychloroquine 200 milliGRAM(s) Oral <User Schedule>  hydroxychloroquine 400 milliGRAM(s) Oral <User Schedule>  methylPREDNISolone sodium succinate Injectable 60 milliGRAM(s) IV Push every 24 hours  pantoprazole    Tablet 40 milliGRAM(s) Oral before breakfast  sodium bicarbonate 1300 milliGRAM(s) Oral every 12 hours  trimethoprim   80 mG/sulfamethoxazole 400 mG 1 Tablet(s) Oral <User Schedule>    MEDICATIONS  (PRN):    ------------------------------------------------------------------------------------------------------------  COORDINATION OF CARE:  Care discussed with consultants/other providers and notes reviewed [Y]

## 2023-05-08 ENCOUNTER — APPOINTMENT (OUTPATIENT)
Dept: RHEUMATOLOGY | Facility: CLINIC | Age: 78
End: 2023-05-08
Payer: MEDICARE

## 2023-05-08 VITALS
TEMPERATURE: 98.1 F | HEART RATE: 76 BPM | SYSTOLIC BLOOD PRESSURE: 189 MMHG | HEIGHT: 62 IN | BODY MASS INDEX: 43.61 KG/M2 | WEIGHT: 237 LBS | OXYGEN SATURATION: 98 % | DIASTOLIC BLOOD PRESSURE: 92 MMHG

## 2023-05-08 DIAGNOSIS — M25.561 PAIN IN RIGHT KNEE: ICD-10-CM

## 2023-05-08 DIAGNOSIS — M25.562 PAIN IN RIGHT KNEE: ICD-10-CM

## 2023-05-08 PROCEDURE — 99213 OFFICE O/P EST LOW 20 MIN: CPT

## 2023-05-09 PROBLEM — M25.561 BILATERAL KNEE PAIN: Status: ACTIVE | Noted: 2019-02-07

## 2023-05-09 NOTE — ASSESSMENT
[FreeTextEntry1] : FMS - asymptomatic with or without daily Rx\par taper as above \par patient counseled on risks/benefits/potential SE of medication\par \par fuv  6 mo\par sooner for flare by phone \par patient is aware of and in agreement with assessment and plans\par

## 2023-05-09 NOTE — HISTORY OF PRESENT ILLNESS
[FreeTextEntry1] : fuv \par pt with FMS and OA\par feels great\par she at times misses her 150mg QHS dose and is interested in trying to come off the RX\par d/w her plan to change to initially 75mg PO BID and then QHS only if no changes\par denies joint pain, arthralgia\par no HA, CP, SOB\par feels well

## 2023-05-11 ENCOUNTER — APPOINTMENT (OUTPATIENT)
Dept: INTERNAL MEDICINE | Facility: CLINIC | Age: 78
End: 2023-05-11
Payer: MEDICARE

## 2023-05-11 VITALS
DIASTOLIC BLOOD PRESSURE: 80 MMHG | WEIGHT: 237 LBS | RESPIRATION RATE: 15 BRPM | OXYGEN SATURATION: 98 % | TEMPERATURE: 98 F | HEIGHT: 62 IN | BODY MASS INDEX: 43.61 KG/M2 | HEART RATE: 68 BPM | SYSTOLIC BLOOD PRESSURE: 147 MMHG

## 2023-05-11 VITALS — DIASTOLIC BLOOD PRESSURE: 70 MMHG | SYSTOLIC BLOOD PRESSURE: 130 MMHG

## 2023-05-11 DIAGNOSIS — D64.9 ANEMIA, UNSPECIFIED: ICD-10-CM

## 2023-05-11 LAB
ALBUMIN SERPL ELPH-MCNC: 4.3 G/DL
ALP BLD-CCNC: 109 U/L
ALT SERPL-CCNC: 10 U/L
ANION GAP SERPL CALC-SCNC: 13 MMOL/L
AST SERPL-CCNC: 18 U/L
BILIRUB SERPL-MCNC: 0.3 MG/DL
BUN SERPL-MCNC: 17 MG/DL
CALCIUM SERPL-MCNC: 9.2 MG/DL
CHLORIDE SERPL-SCNC: 104 MMOL/L
CO2 SERPL-SCNC: 24 MMOL/L
CREAT SERPL-MCNC: 0.74 MG/DL
EGFR: 84 ML/MIN/1.73M2
FERRITIN SERPL-MCNC: 115 NG/ML
FOLATE SERPL-MCNC: >20 NG/ML
GLUCOSE SERPL-MCNC: 87 MG/DL
HCT VFR BLD CALC: 35.5 %
HGB BLD-MCNC: 11.2 G/DL
INR PPP: 2.79 RATIO
IRON SATN MFR SERPL: 18 %
IRON SERPL-MCNC: 58 UG/DL
MCHC RBC-ENTMCNC: 22.4 PG
MCHC RBC-ENTMCNC: 31.5 GM/DL
MCV RBC AUTO: 71 FL
PLATELET # BLD AUTO: 337 K/UL
POTASSIUM SERPL-SCNC: 4.4 MMOL/L
PROT SERPL-MCNC: 6.9 G/DL
PT BLD: 32.7 SEC
RBC # BLD: 5 M/UL
RBC # FLD: 18 %
SODIUM SERPL-SCNC: 140 MMOL/L
TIBC SERPL-MCNC: 317 UG/DL
UIBC SERPL-MCNC: 259 UG/DL
VIT B12 SERPL-MCNC: 582 PG/ML
WBC # FLD AUTO: 5.16 K/UL

## 2023-05-11 PROCEDURE — 99213 OFFICE O/P EST LOW 20 MIN: CPT | Mod: 25

## 2023-05-11 PROCEDURE — 36415 COLL VENOUS BLD VENIPUNCTURE: CPT

## 2023-05-14 ENCOUNTER — NON-APPOINTMENT (OUTPATIENT)
Age: 78
End: 2023-05-14

## 2023-05-15 ENCOUNTER — NON-APPOINTMENT (OUTPATIENT)
Age: 78
End: 2023-05-15

## 2023-05-25 ENCOUNTER — APPOINTMENT (OUTPATIENT)
Dept: CT IMAGING | Facility: IMAGING CENTER | Age: 78
End: 2023-05-25
Payer: MEDICARE

## 2023-05-25 ENCOUNTER — OUTPATIENT (OUTPATIENT)
Dept: OUTPATIENT SERVICES | Facility: HOSPITAL | Age: 78
LOS: 1 days | End: 2023-05-25
Payer: COMMERCIAL

## 2023-05-25 DIAGNOSIS — Z98.1 ARTHRODESIS STATUS: Chronic | ICD-10-CM

## 2023-05-25 DIAGNOSIS — I83.93 ASYMPTOMATIC VARICOSE VEINS OF BILATERAL LOWER EXTREMITIES: Chronic | ICD-10-CM

## 2023-05-25 DIAGNOSIS — Z98.89 OTHER SPECIFIED POSTPROCEDURAL STATES: Chronic | ICD-10-CM

## 2023-05-25 DIAGNOSIS — Z98.890 OTHER SPECIFIED POSTPROCEDURAL STATES: Chronic | ICD-10-CM

## 2023-05-25 DIAGNOSIS — Z90.49 ACQUIRED ABSENCE OF OTHER SPECIFIED PARTS OF DIGESTIVE TRACT: Chronic | ICD-10-CM

## 2023-05-25 DIAGNOSIS — R91.8 OTHER NONSPECIFIC ABNORMAL FINDING OF LUNG FIELD: ICD-10-CM

## 2023-05-25 DIAGNOSIS — Z96.651 PRESENCE OF RIGHT ARTIFICIAL KNEE JOINT: Chronic | ICD-10-CM

## 2023-05-25 PROCEDURE — 71250 CT THORAX DX C-: CPT | Mod: 26

## 2023-05-25 PROCEDURE — 71250 CT THORAX DX C-: CPT

## 2023-05-26 ENCOUNTER — NON-APPOINTMENT (OUTPATIENT)
Age: 78
End: 2023-05-26
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
## 2023-05-31 ENCOUNTER — NON-APPOINTMENT (OUTPATIENT)
Age: 78
End: 2023-05-31

## 2023-06-05 ENCOUNTER — NON-APPOINTMENT (OUTPATIENT)
Age: 78
End: 2023-06-05

## 2023-06-07 ENCOUNTER — NON-APPOINTMENT (OUTPATIENT)
Age: 78
End: 2023-06-07

## 2023-06-08 ENCOUNTER — NON-APPOINTMENT (OUTPATIENT)
Age: 78
End: 2023-06-08

## 2023-06-09 ENCOUNTER — APPOINTMENT (OUTPATIENT)
Dept: PULMONOLOGY | Facility: CLINIC | Age: 78
End: 2023-06-09
Payer: MEDICARE

## 2023-06-09 PROCEDURE — 99441: CPT

## 2023-06-13 ENCOUNTER — LABORATORY RESULT (OUTPATIENT)
Age: 78
End: 2023-06-13

## 2023-06-21 ENCOUNTER — APPOINTMENT (OUTPATIENT)
Dept: ENDOCRINOLOGY | Facility: CLINIC | Age: 78
End: 2023-06-21
Payer: MEDICARE

## 2023-06-21 PROCEDURE — 97802 MEDICAL NUTRITION INDIV IN: CPT

## 2023-06-23 ENCOUNTER — NON-APPOINTMENT (OUTPATIENT)
Age: 78
End: 2023-06-23

## 2023-07-08 ENCOUNTER — EMERGENCY (EMERGENCY)
Facility: HOSPITAL | Age: 78
LOS: 1 days | Discharge: HOME | End: 2023-07-08
Attending: STUDENT IN AN ORGANIZED HEALTH CARE EDUCATION/TRAINING PROGRAM | Admitting: STUDENT IN AN ORGANIZED HEALTH CARE EDUCATION/TRAINING PROGRAM
Payer: MEDICARE

## 2023-07-08 VITALS
HEART RATE: 77 BPM | RESPIRATION RATE: 16 BRPM | TEMPERATURE: 98 F | SYSTOLIC BLOOD PRESSURE: 145 MMHG | OXYGEN SATURATION: 98 % | DIASTOLIC BLOOD PRESSURE: 81 MMHG

## 2023-07-08 VITALS
HEART RATE: 66 BPM | SYSTOLIC BLOOD PRESSURE: 158 MMHG | DIASTOLIC BLOOD PRESSURE: 82 MMHG | TEMPERATURE: 98 F | RESPIRATION RATE: 16 BRPM | OXYGEN SATURATION: 100 %

## 2023-07-08 DIAGNOSIS — Z98.1 ARTHRODESIS STATUS: Chronic | ICD-10-CM

## 2023-07-08 DIAGNOSIS — Z98.89 OTHER SPECIFIED POSTPROCEDURAL STATES: Chronic | ICD-10-CM

## 2023-07-08 DIAGNOSIS — Z96.651 PRESENCE OF RIGHT ARTIFICIAL KNEE JOINT: Chronic | ICD-10-CM

## 2023-07-08 DIAGNOSIS — Z98.890 OTHER SPECIFIED POSTPROCEDURAL STATES: Chronic | ICD-10-CM

## 2023-07-08 DIAGNOSIS — Z90.49 ACQUIRED ABSENCE OF OTHER SPECIFIED PARTS OF DIGESTIVE TRACT: Chronic | ICD-10-CM

## 2023-07-08 DIAGNOSIS — I83.93 ASYMPTOMATIC VARICOSE VEINS OF BILATERAL LOWER EXTREMITIES: Chronic | ICD-10-CM

## 2023-07-08 LAB
ALBUMIN SERPL ELPH-MCNC: 3.8 G/DL — SIGNIFICANT CHANGE UP (ref 3.3–5)
ALP SERPL-CCNC: 105 U/L — SIGNIFICANT CHANGE UP (ref 40–120)
ALT FLD-CCNC: 13 U/L — SIGNIFICANT CHANGE UP (ref 4–33)
ANION GAP SERPL CALC-SCNC: 10 MMOL/L — SIGNIFICANT CHANGE UP (ref 7–14)
APTT BLD: 39.5 SEC — HIGH (ref 27–36.3)
AST SERPL-CCNC: 27 U/L — SIGNIFICANT CHANGE UP (ref 4–32)
B PERT DNA SPEC QL NAA+PROBE: SIGNIFICANT CHANGE UP
B PERT+PARAPERT DNA PNL SPEC NAA+PROBE: SIGNIFICANT CHANGE UP
BASOPHILS # BLD AUTO: 0.08 K/UL — SIGNIFICANT CHANGE UP (ref 0–0.2)
BASOPHILS NFR BLD AUTO: 1.5 % — SIGNIFICANT CHANGE UP (ref 0–2)
BILIRUB SERPL-MCNC: 0.3 MG/DL — SIGNIFICANT CHANGE UP (ref 0.2–1.2)
BORDETELLA PARAPERTUSSIS (RAPRVP): SIGNIFICANT CHANGE UP
BUN SERPL-MCNC: 14 MG/DL — SIGNIFICANT CHANGE UP (ref 7–23)
C PNEUM DNA SPEC QL NAA+PROBE: SIGNIFICANT CHANGE UP
CALCIUM SERPL-MCNC: 9 MG/DL — SIGNIFICANT CHANGE UP (ref 8.4–10.5)
CHLORIDE SERPL-SCNC: 101 MMOL/L — SIGNIFICANT CHANGE UP (ref 98–107)
CO2 SERPL-SCNC: 26 MMOL/L — SIGNIFICANT CHANGE UP (ref 22–31)
CREAT SERPL-MCNC: 0.65 MG/DL — SIGNIFICANT CHANGE UP (ref 0.5–1.3)
EGFR: 91 ML/MIN/1.73M2 — SIGNIFICANT CHANGE UP
EOSINOPHIL # BLD AUTO: 0.14 K/UL — SIGNIFICANT CHANGE UP (ref 0–0.5)
EOSINOPHIL NFR BLD AUTO: 2.6 % — SIGNIFICANT CHANGE UP (ref 0–6)
FLUAV SUBTYP SPEC NAA+PROBE: SIGNIFICANT CHANGE UP
FLUBV RNA SPEC QL NAA+PROBE: SIGNIFICANT CHANGE UP
GLUCOSE SERPL-MCNC: 89 MG/DL — SIGNIFICANT CHANGE UP (ref 70–99)
HADV DNA SPEC QL NAA+PROBE: SIGNIFICANT CHANGE UP
HCOV 229E RNA SPEC QL NAA+PROBE: SIGNIFICANT CHANGE UP
HCOV HKU1 RNA SPEC QL NAA+PROBE: SIGNIFICANT CHANGE UP
HCOV NL63 RNA SPEC QL NAA+PROBE: SIGNIFICANT CHANGE UP
HCOV OC43 RNA SPEC QL NAA+PROBE: SIGNIFICANT CHANGE UP
HCT VFR BLD CALC: 35 % — SIGNIFICANT CHANGE UP (ref 34.5–45)
HGB BLD-MCNC: 10.9 G/DL — LOW (ref 11.5–15.5)
HMPV RNA SPEC QL NAA+PROBE: SIGNIFICANT CHANGE UP
HPIV1 RNA SPEC QL NAA+PROBE: SIGNIFICANT CHANGE UP
HPIV2 RNA SPEC QL NAA+PROBE: SIGNIFICANT CHANGE UP
HPIV3 RNA SPEC QL NAA+PROBE: SIGNIFICANT CHANGE UP
HPIV4 RNA SPEC QL NAA+PROBE: SIGNIFICANT CHANGE UP
IANC: 2.59 K/UL — SIGNIFICANT CHANGE UP (ref 1.8–7.4)
IMM GRANULOCYTES NFR BLD AUTO: 0.2 % — SIGNIFICANT CHANGE UP (ref 0–0.9)
INR BLD: 2.65 RATIO — HIGH (ref 0.88–1.16)
LYMPHOCYTES # BLD AUTO: 1.83 K/UL — SIGNIFICANT CHANGE UP (ref 1–3.3)
LYMPHOCYTES # BLD AUTO: 34 % — SIGNIFICANT CHANGE UP (ref 13–44)
M PNEUMO DNA SPEC QL NAA+PROBE: SIGNIFICANT CHANGE UP
MCHC RBC-ENTMCNC: 22.3 PG — LOW (ref 27–34)
MCHC RBC-ENTMCNC: 31.1 GM/DL — LOW (ref 32–36)
MCV RBC AUTO: 71.6 FL — LOW (ref 80–100)
MONOCYTES # BLD AUTO: 0.73 K/UL — SIGNIFICANT CHANGE UP (ref 0–0.9)
MONOCYTES NFR BLD AUTO: 13.6 % — SIGNIFICANT CHANGE UP (ref 2–14)
NEUTROPHILS # BLD AUTO: 2.59 K/UL — SIGNIFICANT CHANGE UP (ref 1.8–7.4)
NEUTROPHILS NFR BLD AUTO: 48.1 % — SIGNIFICANT CHANGE UP (ref 43–77)
NRBC # BLD: 0 /100 WBCS — SIGNIFICANT CHANGE UP (ref 0–0)
NRBC # FLD: 0 K/UL — SIGNIFICANT CHANGE UP (ref 0–0)
NT-PROBNP SERPL-SCNC: 301 PG/ML — HIGH
PLATELET # BLD AUTO: 342 K/UL — SIGNIFICANT CHANGE UP (ref 150–400)
POTASSIUM SERPL-MCNC: 4.7 MMOL/L — SIGNIFICANT CHANGE UP (ref 3.5–5.3)
POTASSIUM SERPL-SCNC: 4.7 MMOL/L — SIGNIFICANT CHANGE UP (ref 3.5–5.3)
PROT SERPL-MCNC: 7 G/DL — SIGNIFICANT CHANGE UP (ref 6–8.3)
PROTHROM AB SERPL-ACNC: 31 SEC — HIGH (ref 10.5–13.4)
RAPID RVP RESULT: SIGNIFICANT CHANGE UP
RBC # BLD: 4.89 M/UL — SIGNIFICANT CHANGE UP (ref 3.8–5.2)
RBC # FLD: 16 % — HIGH (ref 10.3–14.5)
RSV RNA SPEC QL NAA+PROBE: SIGNIFICANT CHANGE UP
RV+EV RNA SPEC QL NAA+PROBE: SIGNIFICANT CHANGE UP
SARS-COV-2 RNA SPEC QL NAA+PROBE: SIGNIFICANT CHANGE UP
SODIUM SERPL-SCNC: 137 MMOL/L — SIGNIFICANT CHANGE UP (ref 135–145)
TROPONIN T, HIGH SENSITIVITY RESULT: 13 NG/L — SIGNIFICANT CHANGE UP
TROPONIN T, HIGH SENSITIVITY RESULT: 13 NG/L — SIGNIFICANT CHANGE UP
WBC # BLD: 5.38 K/UL — SIGNIFICANT CHANGE UP (ref 3.8–10.5)
WBC # FLD AUTO: 5.38 K/UL — SIGNIFICANT CHANGE UP (ref 3.8–10.5)

## 2023-07-08 PROCEDURE — 99285 EMERGENCY DEPT VISIT HI MDM: CPT

## 2023-07-08 PROCEDURE — 71046 X-RAY EXAM CHEST 2 VIEWS: CPT | Mod: 26

## 2023-07-08 PROCEDURE — 93010 ELECTROCARDIOGRAM REPORT: CPT

## 2023-07-08 PROCEDURE — 99284 EMERGENCY DEPT VISIT MOD MDM: CPT

## 2023-07-08 RX ORDER — IPRATROPIUM/ALBUTEROL SULFATE 18-103MCG
3 AEROSOL WITH ADAPTER (GRAM) INHALATION ONCE
Refills: 0 | Status: COMPLETED | OUTPATIENT
Start: 2023-07-08 | End: 2023-07-08

## 2023-07-08 RX ADMIN — Medication 3 MILLILITER(S): at 15:25

## 2023-07-08 RX ADMIN — Medication 3 MILLILITER(S): at 13:42

## 2023-07-08 NOTE — ED PROVIDER NOTE - NS ED MD DISPO DISCHARGE CCDA
Take a COVID and FLU test today. Go to [facility] with your insurance card or any pharmacy to ask for a home test.     Use ibuprofen for your headache     visit covidtests. gov for freetests - free tests will be delivered to your home Patient/Caregiver provided printed discharge information.

## 2023-07-08 NOTE — ED PROVIDER NOTE - PROGRESS NOTE DETAILS
LIZ Logan: Pt reassessed, reports improvement following dupneb, states she had a normal stress test and echo 3-4 months ago. Will give 2nd duoneb and then discharge home with PMD/cards follow up. Pt will return to the ER with any worsening or concerning symptoms.

## 2023-07-08 NOTE — ED ADULT NURSE NOTE - NSICDXPASTMEDICALHX_GEN_ALL_CORE_FT
PAST MEDICAL HISTORY:  Asthma well controlled, no recent hospitalizations    Flat feet, bilateral     [place] filter in place 2005    History of DVT (deep vein thrombosis) 2005, 2014, 2015    History of pulmonary embolism 2004    HTN (hypertension)     Lung nodule     Neuropathy right foot    Obesity, morbid, BMI 40.0-49.9     Osteoarthritis     Osteoarthritis of right knee     PE (pulmonary embolism) s/p [place] Filter- 2005    Polymyalgia rheumatica     Spondylisthesis     Swollen ankles     Thalassemia, beta

## 2023-07-08 NOTE — ED PROVIDER NOTE - PATIENT PORTAL LINK FT
You can access the FollowMyHealth Patient Portal offered by [facility] by registering at the following website: [email or web address]. By joining [employer]’s FollowMyHealth portal, you will also be able to view your health information using other applications (apps) compatible with our system.

## 2023-07-08 NOTE — ED PROVIDER NOTE - CLINICAL SUMMARY MEDICAL DECISION MAKING FREE TEXT BOX
77-year-old female with past medical history of asthma, HTN, OA, polymyalgia rheumatica, PE/DVTs on warfarin presenting to the ER with chest pressure.  Patient states last night before going to bed she felt chest pressure to her lower chest, continued this morning with shortness of breath and dry cough. Some relief with albuterol rescue inhaler and sitting by the AC. reports normal stress test and echo 3-4 months ago. On exam pt is well appearing, EKG NSR non ischemic, vitals stable, lungs clear to auscultation. Concern for asthma exacerbation given new dry cough, viral illness, acs, no hypoxia or tachycardia to suggest PE and pt is on AC. Plan: cbc/cmp, trop, CXR, trial duoneb.

## 2023-07-08 NOTE — ED PROVIDER NOTE - NSICDXPASTSURGICALHX_GEN_ALL_CORE_FT
PAST SURGICAL HISTORY:  [place] filter in place 2005    H/O eye surgery left retinal tear, 2012    History of ankle fusion right, 2017    History of appendectomy     History of appendectomy 2015    History of hernia surgery 2013    History of total right knee replacement     S/P hip replacement bilateral, 2008, 2009    S/P hysterectomy 1999    Varicose vein of leg cauterization left leg vein 1989

## 2023-07-08 NOTE — ED PROVIDER NOTE - NSFOLLOWUPINSTRUCTIONS_ED_ALL_ED_FT
Follow up with your primary care doctor and cardiologist within 1 week  Continue taking medication as previously prescribed to you  Return to the ER with any worsening or concerning symptoms, chest pain, shortness of breath, weakness, dizziness, feeling faint or any other concerns.

## 2023-07-08 NOTE — ED PROVIDER NOTE - NSICDXPASTMEDICALHX_GEN_ALL_CORE_FT
Reason For Visit:   Chief Complaint   Patient presents with     Surgical Followup     Right ROBERTA F/U . Having left hip pain       Primary MD: Carrington Gil  Referring MD: Jeannine Malik*      Pain Assessment  Patient Currently in Pain: Yes  0-10 Pain Scale: 2  Primary Pain Location: Hip    Current Outpatient Medications   Medication     amLODIPine (NORVASC) 5 MG tablet     CHOLECALCIFEROL PO     gabapentin (NEURONTIN) 100 MG capsule     losartan (COZAAR) 25 MG tablet     mometasone (ELOCON) 0.1 % ointment     multivitamin, therapeutic with minerals (MULTI-VITAMIN) TABS tablet     sildenafil (VIAGRA) 50 MG tablet     sirolimus (GENERIC EQUIVALENT) 1 MG tablet     mycophenolate (GENERIC EQUIVALENT) 250 MG capsule     No current facility-administered medications for this visit.           Allergies   Allergen Reactions     Paxil [Paroxetine] Other (See Comments)     Caused Severe Tremor     Cepacol Maximum Strength Rash     Chlorhexidine Rash     Phenol Rash           Niki Maradiaga, DEEPALIN    
PAST MEDICAL HISTORY:  Asthma well controlled, no recent hospitalizations    Flat feet, bilateral     [place] filter in place 2005    History of DVT (deep vein thrombosis) 2005, 2014, 2015    History of pulmonary embolism 2004    HTN (hypertension)     Lung nodule     Neuropathy right foot    Obesity, morbid, BMI 40.0-49.9     Osteoarthritis     Osteoarthritis of right knee     PE (pulmonary embolism) s/p [place] Filter- 2005    Polymyalgia rheumatica     Spondylisthesis     Swollen ankles     Thalassemia, beta

## 2023-07-08 NOTE — ED ADULT NURSE NOTE - OBJECTIVE STATEMENT
Yes
Pt received to room 27 A&XO4 c/o chest "fullness" and shortness of breath starting last night after eating. Pt reports improvement in symptoms since arriving to ED. Pt states shortness of breath was not relieved by albuterol at home. Breathing even and unlabored on RA. NSR on CM. Pt denies chest pain. 20G IV placed in LAC. Labs drawn and sent. Pending CXR. Family at bedside.

## 2023-07-08 NOTE — ED PROVIDER NOTE - OBJECTIVE STATEMENT
77-year-old female with past medical history of asthma, HTN, OA, polymyalgia rheumatica, PE/DVTs on warfarin presenting to the ER with chest pressure.  Patient states last night before going to bed she felt chest pressure to her lower chest.  Patient states she woke this morning continued to have chest pressure and felt short of breath, states she normally takes Advair but today also took her rescue inhaler albuterol.  Patient states after this she developed shakiness.  Patient states for the past 2 days she has also been having a posterior headache and this morning began to have a dry cough.  Patient states she has not had an asthma exacerbation in many years but does not remember 1 presenting with similar symptoms.  Chest pressure is not exertional, non radiating. Patient denies fever/chills, palpitations, near syncope, nausea, diaphoresis, abdominal pain, vomiting, diarrhea, recent travel or illness or any other concerns.  Of note patient reports bilateral leg swelling left more than right at her baseline.

## 2023-07-08 NOTE — ED ADULT NURSE NOTE - NSICDXPASTSURGICALHX_GEN_ALL_CORE_FT
PAST SURGICAL HISTORY:  [place] filter in place 2005    H/O eye surgery left retinal tear, 2012    History of ankle fusion right, 2017    History of appendectomy     History of appendectomy 2015    History of hernia surgery 2013    History of total right knee replacement     S/P hip replacement bilateral, 2008, 2009    S/P hysterectomy 1999    Varicose vein of leg cauterization left leg vein 1989

## 2023-07-08 NOTE — ED PROVIDER NOTE - ATTENDING APP SHARED VISIT CONTRIBUTION OF CARE
negative 77-year-old female with past medical history of asthma, hypertension, osteoarthritis, polymyalgia rheumatica, PE/DVTs on warfarin, presenting with complaints of chest pressure.  Patient states symptoms started last night, and continued into this morning.  No improvement with inhalers.  Denies associated nausea, vomiting, and diaphoresis.  Patient also notes cough that she developed this morning.  No associated fevers or chills.  Denies leg pain or swelling.  Denies orthopnea.  On exam patient is well-appearing, no acute distress, heart regular rate and rhythm, lungs with decreased breath sounds bilaterally.  No lower extreme edema, no JVD, no abdominal tenderness to palpation.  Patient states she had a normal stress and echo a few months ago.  Plan for EKG, chest x-ray, labs, cardiac monitor, will treat with DuoNebs, and reassess.    Pt reports improved sx s/p duonebs, offered admission for further work up and monitoring but pt prefers to go home and follow up with her doctor. Discussed strict return precautions

## 2023-07-10 ENCOUNTER — NON-APPOINTMENT (OUTPATIENT)
Age: 78
End: 2023-07-10

## 2023-07-14 ENCOUNTER — LABORATORY RESULT (OUTPATIENT)
Age: 78
End: 2023-07-14

## 2023-07-18 ENCOUNTER — LABORATORY RESULT (OUTPATIENT)
Age: 78
End: 2023-07-18

## 2023-07-19 ENCOUNTER — APPOINTMENT (OUTPATIENT)
Dept: NUCLEAR MEDICINE | Facility: IMAGING CENTER | Age: 78
End: 2023-07-19
Payer: MEDICARE

## 2023-07-19 ENCOUNTER — OUTPATIENT (OUTPATIENT)
Dept: OUTPATIENT SERVICES | Facility: HOSPITAL | Age: 78
LOS: 1 days | End: 2023-07-19
Payer: COMMERCIAL

## 2023-07-19 DIAGNOSIS — Z98.89 OTHER SPECIFIED POSTPROCEDURAL STATES: Chronic | ICD-10-CM

## 2023-07-19 DIAGNOSIS — Z98.890 OTHER SPECIFIED POSTPROCEDURAL STATES: Chronic | ICD-10-CM

## 2023-07-19 DIAGNOSIS — R91.1 SOLITARY PULMONARY NODULE: ICD-10-CM

## 2023-07-19 DIAGNOSIS — Z96.651 PRESENCE OF RIGHT ARTIFICIAL KNEE JOINT: Chronic | ICD-10-CM

## 2023-07-19 DIAGNOSIS — I83.93 ASYMPTOMATIC VARICOSE VEINS OF BILATERAL LOWER EXTREMITIES: Chronic | ICD-10-CM

## 2023-07-19 DIAGNOSIS — Z98.1 ARTHRODESIS STATUS: Chronic | ICD-10-CM

## 2023-07-19 DIAGNOSIS — Z90.49 ACQUIRED ABSENCE OF OTHER SPECIFIED PARTS OF DIGESTIVE TRACT: Chronic | ICD-10-CM

## 2023-07-19 PROCEDURE — 78815 PET IMAGE W/CT SKULL-THIGH: CPT | Mod: 26,PI

## 2023-07-19 PROCEDURE — 78815 PET IMAGE W/CT SKULL-THIGH: CPT

## 2023-07-19 PROCEDURE — A9552: CPT

## 2023-07-26 ENCOUNTER — NON-APPOINTMENT (OUTPATIENT)
Age: 78
End: 2023-07-26

## 2023-08-02 ENCOUNTER — LABORATORY RESULT (OUTPATIENT)
Age: 78
End: 2023-08-02

## 2023-08-03 ENCOUNTER — LABORATORY RESULT (OUTPATIENT)
Age: 78
End: 2023-08-03

## 2023-08-04 ENCOUNTER — LABORATORY RESULT (OUTPATIENT)
Age: 78
End: 2023-08-04

## 2023-08-08 ENCOUNTER — LABORATORY RESULT (OUTPATIENT)
Age: 78
End: 2023-08-08

## 2023-08-30 ENCOUNTER — RX RENEWAL (OUTPATIENT)
Age: 78
End: 2023-08-30

## 2023-09-06 ENCOUNTER — APPOINTMENT (OUTPATIENT)
Dept: ENDOCRINOLOGY | Facility: CLINIC | Age: 78
End: 2023-09-06

## 2023-09-14 ENCOUNTER — LABORATORY RESULT (OUTPATIENT)
Age: 78
End: 2023-09-14

## 2023-09-25 ENCOUNTER — APPOINTMENT (OUTPATIENT)
Dept: INTERNAL MEDICINE | Facility: CLINIC | Age: 78
End: 2023-09-25
Payer: MEDICARE

## 2023-09-25 PROCEDURE — 90662 IIV NO PRSV INCREASED AG IM: CPT

## 2023-09-25 PROCEDURE — G0008: CPT

## 2023-09-29 ENCOUNTER — APPOINTMENT (OUTPATIENT)
Dept: ENDOCRINOLOGY | Facility: CLINIC | Age: 78
End: 2023-09-29
Payer: MEDICARE

## 2023-09-29 VITALS
TEMPERATURE: 97.4 F | BODY MASS INDEX: 42.98 KG/M2 | HEART RATE: 70 BPM | DIASTOLIC BLOOD PRESSURE: 75 MMHG | WEIGHT: 235 LBS | SYSTOLIC BLOOD PRESSURE: 134 MMHG | OXYGEN SATURATION: 98 %

## 2023-09-29 DIAGNOSIS — E66.01 MORBID (SEVERE) OBESITY DUE TO EXCESS CALORIES: ICD-10-CM

## 2023-09-29 DIAGNOSIS — R63.4 ABNORMAL WEIGHT LOSS: ICD-10-CM

## 2023-09-29 LAB — HBA1C MFR BLD HPLC: 5.5

## 2023-09-29 PROCEDURE — 99214 OFFICE O/P EST MOD 30 MIN: CPT

## 2023-09-29 NOTE — PHYSICAL THERAPY INITIAL EVALUATION ADULT - LEVEL OF INDEPENDENCE: SIT/SUPINE, REHAB EVAL
Occupational Therapy    Visit Type: treatment  SUBJECTIVE  Patient agreed to participate in therapy this date.  RN in agreement to work with patient for therapy session.  \"I've been here for a month.\"    Pain   Patient does not demonstrate pain behaviors.    OBJECTIVE     Cognitive Status   Affect/Behavior    - cooperative, impulsive and calm  Orientation    - Oriented to: person   - Disoriented to: place, time and situation  Functional Communication   - Overall Status: impaired   - Forms of Communication: verbal  Attention Span    - Attention: - difficulty attending to directions   - Attention impairment: distractibility, impulsivity and reduced memory  Following Direction   - follows one step commands inconsistently   -  follows 1 step commands with 10% accuracy with moderate cues  Transition Between Tasks   - transitions with cues  Memory   - - decreased recall of recent events - decreased short term memory - decreased long term memory  Safety Awareness/Insight   - decreased awareness of need for safety  Awareness of Deficits   - not aware of deficits  Pt with decreased ability to follow motor or verbal commands.  Pt does well w/ visual cues to perform tasks, such as pointing to the toilet to ask if he needs the bathroom, or showing him his toothbrush to cue him to brush his teeth.  Much of pt's verbal expression is out of context to the situation, and he requires cues to re-direct.          Bed Mobility  - Side-lying to sit: supervision  - Sit to side-lying: supervision  Transfers  Assistive devices: none  - Sit to stand: stand by assist  - Stand to sit: stand by assist      Functional Ambulation  - Assistance: stand by assist and contact guard/touching/steadying assist  - Assistive device: none and 2-wheeled walker  - Surface: even  Pt amb to bathroom w/o AD with SBA for safety.  Pt amb in hallway with and without RW, working on following directions, safety navigating around obstacles, topographical  orientation.  Pt req CGA w/o AD, SBA w/ RW however pt intermittently gets confused using RW properly and often needs cues and re-direction.    Activities of Daily Living (ADLs)  Grooming/Oral Hygiene:   - Grooming assist: supervision  - Position: standing at sink  - Assist needed for: wash/dry hands and teeth care (pt demos appropriate sequencing during oral hygiene; initially picks up deoderant to apply to toothpaste and self corrects, and reaches for toothpaste)  Upper Body Dressing:  - Assist: stand by assist  - Position: standing  - Assist needed for: supervision/safety  Toileting:   - Assist: supervision (standing to urinate)  - Equipment: grab bar use     Interventions    Training provided: ADL training, safety training, transfer training and positioning  Skilled input: verbal instruction/cues and tactile instruction/cues         Education:   - Present and not ready to learn: patient  Education provided during session:  - Results of above outlined education: Needs reinforcement and No evidence of learning    ASSESSMENT   Progress: progressing toward goals  Interferring components: cognitive deficits    Discharge needs based on today's assessment:  - Current level of function: slightly below baseline level of function  - Therapy needs at discharge: therapy 1-3 times per week  - Activities of daily living (ADLs) requiring support at discharge: bed mobility, bathing, transfers, continence, toileting, feeding, dressing, ambulation and grooming  - Instrumental activities of daily living (IADLs) requiring support at discharge: driving, community mobility, health/medication management, home management, meal preparation, shopping, emergency responses, financial management and [employer] observances  - Impairments that require further therapy intervention: activity tolerance, cognition, executive functioning, safety awareness, strength and balance    AM-PAC  - Prior Level of Function: IND/MOD I ([facility] 22-24)       Key:  MOD A=moderate assistance, IND/MOD I=independent/modified independent  - Generalized Current Level of Function     - Current Self-Cares: 18       Scoring Key= >21 Modified Independent; 20-21 Supervision; 18-19 Minimal assist; 13-17 Moderate assist; 9-12 Max assist; <9 Total assist        PLAN (while hospitalized)  Suggestions for next session as indicated:         OT Frequency: 3-5 x per week      PT/OT Mobility Equipment for Discharge: TBD- if home, hospital bed  PT/OT ADL Equipment for Discharge: TBD at [facility]  Agreement to plan and goals: patient unable to agree with goals and treatment plan      GOALS  Review Date: 9/29/2023  Long Term Goals: (to be met by time of discharge from hospital)  Feeding: Patient will complete feeding tasks set up and with minimal cues.  Status: met   Grooming: Patient will complete grooming tasks in sitting supervision.  Status: met   Upper body dressing: Patient will complete upper body dressing in sitting minimal assist and with minimal cues.  Status: met   Toileting: Patient will complete toileting moderate assist.  Status: progressing/ongoing  Sit (edge of bed): Patient will sit at edge of bed for 10 minutes, minimal assist and with minimal cues.   Status: met   Toilet transfer: Patient will complete toilet transfer with grab bar use, supervision.     Pt to follow approximately 4/5 of motor commands with min cueing in order to increase participation with ADLs. (ongoing)    Pt will appropriately retrieve 4/5 items with mod cues in order to sequence ADLs.  (ongoing)            Documented in the chart in the following areas:  Services. Assessment/Plan.    Patient at End of Session:   Location: in bed  Safety measures: alarm system in place/re-engaged, bed rails x4, call light within reach and equipment intact  Handoff to: nurse      Therapy procedure time and total treatment time can be found documented on the Time Entry flowsheet   left sitting in chair in NAD/unable to perform

## 2023-10-13 ENCOUNTER — APPOINTMENT (OUTPATIENT)
Dept: OPHTHALMOLOGY | Facility: CLINIC | Age: 78
End: 2023-10-13
Payer: MEDICARE

## 2023-10-13 ENCOUNTER — NON-APPOINTMENT (OUTPATIENT)
Age: 78
End: 2023-10-13

## 2023-10-13 PROCEDURE — 92134 CPTRZ OPH DX IMG PST SGM RTA: CPT

## 2023-10-13 PROCEDURE — 92014 COMPRE OPH EXAM EST PT 1/>: CPT

## 2023-10-13 PROCEDURE — 92136 OPHTHALMIC BIOMETRY: CPT

## 2023-10-16 ENCOUNTER — LABORATORY RESULT (OUTPATIENT)
Age: 78
End: 2023-10-16

## 2023-10-17 RX ORDER — SEMAGLUTIDE 0.25 MG/.5ML
0.25 INJECTION, SOLUTION SUBCUTANEOUS
Qty: 4 | Refills: 3 | Status: ACTIVE | COMMUNITY
Start: 2023-09-29

## 2023-11-01 ENCOUNTER — APPOINTMENT (OUTPATIENT)
Dept: RHEUMATOLOGY | Facility: CLINIC | Age: 78
End: 2023-11-01
Payer: MEDICARE

## 2023-11-01 DIAGNOSIS — M79.7 FIBROMYALGIA: ICD-10-CM

## 2023-11-01 PROCEDURE — 99213 OFFICE O/P EST LOW 20 MIN: CPT | Mod: 95

## 2023-11-09 ENCOUNTER — APPOINTMENT (OUTPATIENT)
Dept: INTERNAL MEDICINE | Facility: CLINIC | Age: 78
End: 2023-11-09
Payer: MEDICARE

## 2023-11-09 ENCOUNTER — NON-APPOINTMENT (OUTPATIENT)
Age: 78
End: 2023-11-09

## 2023-11-09 VITALS
DIASTOLIC BLOOD PRESSURE: 82 MMHG | HEIGHT: 62 IN | HEART RATE: 65 BPM | TEMPERATURE: 97.1 F | RESPIRATION RATE: 15 BRPM | WEIGHT: 240 LBS | SYSTOLIC BLOOD PRESSURE: 149 MMHG | OXYGEN SATURATION: 98 % | BODY MASS INDEX: 44.16 KG/M2

## 2023-11-09 VITALS — DIASTOLIC BLOOD PRESSURE: 80 MMHG | SYSTOLIC BLOOD PRESSURE: 130 MMHG

## 2023-11-09 DIAGNOSIS — Z12.11 ENCOUNTER FOR SCREENING FOR MALIGNANT NEOPLASM OF COLON: ICD-10-CM

## 2023-11-09 DIAGNOSIS — Z00.00 ENCOUNTER FOR GENERAL ADULT MEDICAL EXAMINATION W/OUT ABNORMAL FINDINGS: ICD-10-CM

## 2023-11-09 DIAGNOSIS — J45.909 UNSPECIFIED ASTHMA, UNCOMPLICATED: ICD-10-CM

## 2023-11-09 DIAGNOSIS — D56.3 THALASSEMIA MINOR: ICD-10-CM

## 2023-11-09 DIAGNOSIS — I10 ESSENTIAL (PRIMARY) HYPERTENSION: ICD-10-CM

## 2023-11-09 PROCEDURE — 36415 COLL VENOUS BLD VENIPUNCTURE: CPT

## 2023-11-09 PROCEDURE — G0439: CPT

## 2023-11-09 PROCEDURE — 93000 ELECTROCARDIOGRAM COMPLETE: CPT

## 2023-11-10 LAB
25(OH)D3 SERPL-MCNC: 35.2 NG/ML
ALBUMIN SERPL ELPH-MCNC: 4.2 G/DL
ALP BLD-CCNC: 120 U/L
ALT SERPL-CCNC: 12 U/L
ANION GAP SERPL CALC-SCNC: 11 MMOL/L
AST SERPL-CCNC: 20 U/L
BILIRUB SERPL-MCNC: 0.2 MG/DL
BUN SERPL-MCNC: 16 MG/DL
CALCIUM SERPL-MCNC: 9.2 MG/DL
CHLORIDE SERPL-SCNC: 104 MMOL/L
CHOLEST SERPL-MCNC: 183 MG/DL
CO2 SERPL-SCNC: 26 MMOL/L
CREAT SERPL-MCNC: 0.72 MG/DL
EGFR: 87 ML/MIN/1.73M2
ESTIMATED AVERAGE GLUCOSE: 120 MG/DL
FOLATE SERPL-MCNC: 17.2 NG/ML
GLUCOSE SERPL-MCNC: 95 MG/DL
HBA1C MFR BLD HPLC: 5.8 %
HCT VFR BLD CALC: 38.2 %
HDLC SERPL-MCNC: 83 MG/DL
HGB BLD-MCNC: 11.6 G/DL
INR PPP: 2.67 RATIO
LDLC SERPL CALC-MCNC: 84 MG/DL
MCHC RBC-ENTMCNC: 22.2 PG
MCHC RBC-ENTMCNC: 30.4 GM/DL
MCV RBC AUTO: 73.2 FL
NONHDLC SERPL-MCNC: 100 MG/DL
PLATELET # BLD AUTO: 337 K/UL
POTASSIUM SERPL-SCNC: 4.9 MMOL/L
PROT SERPL-MCNC: 6.8 G/DL
PT BLD: 29.3 SEC
RBC # BLD: 5.22 M/UL
RBC # FLD: 18.6 %
SODIUM SERPL-SCNC: 142 MMOL/L
TRIGL SERPL-MCNC: 87 MG/DL
TSH SERPL-ACNC: 1.39 UIU/ML
VIT B12 SERPL-MCNC: 549 PG/ML
WBC # FLD AUTO: 4.32 K/UL

## 2023-11-22 RX ORDER — ALBUTEROL SULFATE 90 UG/1
108 (90 BASE) AEROSOL, METERED RESPIRATORY (INHALATION) EVERY 4 HOURS
Qty: 1 | Refills: 3 | Status: ACTIVE | COMMUNITY
Start: 1900-01-01 | End: 1900-01-01

## 2023-11-27 NOTE — PRE-OP CHECKLIST - NS PREOP CHK CHLOROHEX WASH
Subjective   Patient ID: Dharmesh is a 66 year old  female.    Chief Complaint   Patient presents with   • Physical        HPI  Here today for annual exam without complaints.  She had questions about the Pap smear and ovaries after menopause.  There have been no changes in her family history.  She does not get a flu vaccine and did not get the more recent COVID-vaccine but is up-to-date with pneumonia vaccines and shingles vaccine.  This is a Medicare medication reconciliation visit because she gets her annual exam through [facility]    Review of Systems   Constitutional: Negative for chills and fever.   Respiratory: Negative for cough and shortness of breath.    Cardiovascular: Negative for chest pain.   Gastrointestinal: Negative for abdominal pain, constipation, diarrhea, nausea and vomiting.   Genitourinary: Negative for difficulty urinating, dysuria and hematuria.   Neurological: Negative for dizziness, light-headedness and headaches.   Psychiatric/Behavioral: Negative for dysphoric mood, self-injury, sleep disturbance and suicidal ideas. The patient is not nervous/anxious.    All other systems reviewed and are negative.      GYN History:    Menstrual status: postmenopausal     Last pap smear pending.    Last mammogram pending yearly through duly.    Colonoscopy due in 2025.  DEXA: Obtaining today  Physical Exam   Visit Vitals  /82   Pulse 81   Resp 18   Ht 5' 8\" (1.727 m)   Wt 112.6 kg (248 lb 5.6 oz)   SpO2 95%   BMI 37.76 kg/m²       Constitutional: Alert and oriented x3, well-nourished, well-appearing.  HENT: Atraumatic, normocephalic   Eyes: Conjunctivae and EOM are normal. Pupils are equal, round, and reactive to light.   Neck: Neck supple. No thyromegaly present.  No cervical lymphadenopathy.   Cardiovascular: Normal rate and regular rhythm. No murmur, rub or gallop.  Pulmonary/Chest: Clear to auscultation bilaterally.  Breast:  Normal appearing breasts bilaterally, no skin changes, nipples  symmetric and normal, no discharge, no masses or tenderness.  No axillary lymphadenopathy.  Abdominal: Bowel sounds normal. Soft, nontender, nondistended.  No rebound/guarding/rigidity.  No palpalble mass.  No hernia.  No hepatosplenomegaly.    Genitourinary: Atrophic external genital exam with normal urethra.  Musculoskeletal: Normal range of motion. She exhibits no edema.   Neurological: CN II-XII grossly intact  Skin: Skin is warm and dry. No rash noted. No erythema.   Psychiatric: She has a normal mood and affect.     Nursing note and vitals reviewed.     Assessment   Problem List Items Addressed This Visit    None      Pap smears and HPV testing as according to ACOG guidelines.  Continue with yearly breast and pelvic exams.  Discussed vaccinations with her but she declined the flu vaccine     Health Maintenance Summary     Depression Screening (Yearly)  Overdue - never done    Colorectal Cancer Screen- (Colonoscopy - Every 10 Years)  Overdue - never done    Hepatitis C Screening (Once)  Overdue - never done    Influenza Vaccine (1)  Overdue - never done    COVID-19 Vaccine (4 - 2023-24 season)  Overdue since 9/1/2023    Breast Cancer Screening (Every 2 Years)  Next due on 11/25/2024    DTaP/Tdap/Td Vaccine (2 - Td or Tdap)  Next due on 9/6/2029    Osteoporosis Screening   Completed    Shingles Vaccine   Completed    Meningococcal Vaccine   Aged Out    Hepatitis B Vaccine (For Physician/APC Discussion)   Aged Out    HPV Vaccine   Aged Out    Cervical Cancer Screen 30-64 -   Discontinued          Arnel Grewal MD               in ASU:

## 2023-11-29 ENCOUNTER — APPOINTMENT (OUTPATIENT)
Dept: CT IMAGING | Facility: IMAGING CENTER | Age: 78
End: 2023-11-29
Payer: MEDICARE

## 2023-11-29 ENCOUNTER — OUTPATIENT (OUTPATIENT)
Dept: OUTPATIENT SERVICES | Facility: HOSPITAL | Age: 78
LOS: 1 days | End: 2023-11-29
Payer: COMMERCIAL

## 2023-11-29 DIAGNOSIS — Z98.89 OTHER SPECIFIED POSTPROCEDURAL STATES: Chronic | ICD-10-CM

## 2023-11-29 DIAGNOSIS — R91.8 OTHER NONSPECIFIC ABNORMAL FINDING OF LUNG FIELD: ICD-10-CM

## 2023-11-29 DIAGNOSIS — I83.93 ASYMPTOMATIC VARICOSE VEINS OF BILATERAL LOWER EXTREMITIES: Chronic | ICD-10-CM

## 2023-11-29 DIAGNOSIS — Z96.651 PRESENCE OF RIGHT ARTIFICIAL KNEE JOINT: Chronic | ICD-10-CM

## 2023-11-29 DIAGNOSIS — Z98.890 OTHER SPECIFIED POSTPROCEDURAL STATES: Chronic | ICD-10-CM

## 2023-11-29 DIAGNOSIS — Z90.49 ACQUIRED ABSENCE OF OTHER SPECIFIED PARTS OF DIGESTIVE TRACT: Chronic | ICD-10-CM

## 2023-11-29 DIAGNOSIS — Z98.1 ARTHRODESIS STATUS: Chronic | ICD-10-CM

## 2023-11-29 PROCEDURE — 71250 CT THORAX DX C-: CPT | Mod: 26

## 2023-11-29 PROCEDURE — 71250 CT THORAX DX C-: CPT

## 2023-12-11 ENCOUNTER — APPOINTMENT (OUTPATIENT)
Dept: ENDOCRINOLOGY | Facility: CLINIC | Age: 78
End: 2023-12-11

## 2023-12-11 ENCOUNTER — LABORATORY RESULT (OUTPATIENT)
Age: 78
End: 2023-12-11

## 2023-12-14 ENCOUNTER — LABORATORY RESULT (OUTPATIENT)
Age: 78
End: 2023-12-14

## 2023-12-15 ENCOUNTER — APPOINTMENT (OUTPATIENT)
Dept: PULMONOLOGY | Facility: CLINIC | Age: 78
End: 2023-12-15
Payer: MEDICARE

## 2023-12-15 PROCEDURE — 99442: CPT

## 2023-12-18 NOTE — HISTORY OF PRESENT ILLNESS
[FreeTextEntry1] : doing well had tapered off but now resumed lyrica 75mg po BID no complaints   video visit 22 minutes  patient at home  MD in office [id number] pt consented to this video visit

## 2023-12-22 ENCOUNTER — APPOINTMENT (OUTPATIENT)
Dept: PULMONOLOGY | Facility: CLINIC | Age: 78
End: 2023-12-22
Payer: MEDICARE

## 2023-12-22 VITALS
OXYGEN SATURATION: 96 % | DIASTOLIC BLOOD PRESSURE: 84 MMHG | HEART RATE: 76 BPM | HEIGHT: 62 IN | RESPIRATION RATE: 16 BRPM | SYSTOLIC BLOOD PRESSURE: 153 MMHG | TEMPERATURE: 97.6 F | BODY MASS INDEX: 44.16 KG/M2 | WEIGHT: 240 LBS

## 2023-12-22 DIAGNOSIS — J45.909 UNSPECIFIED ASTHMA, UNCOMPLICATED: ICD-10-CM

## 2023-12-22 DIAGNOSIS — R91.8 OTHER NONSPECIFIC ABNORMAL FINDING OF LUNG FIELD: ICD-10-CM

## 2023-12-22 PROCEDURE — 99215 OFFICE O/P EST HI 40 MIN: CPT

## 2023-12-26 ENCOUNTER — NON-APPOINTMENT (OUTPATIENT)
Age: 78
End: 2023-12-26

## 2023-12-26 PROBLEM — J45.909 ASTHMA, UNSPECIFIED ASTHMA SEVERITY, UNSPECIFIED WHETHER COMPLICATED, UNSPECIFIED WHETHER PERSISTENT: Status: ACTIVE | Noted: 2018-06-21

## 2023-12-26 PROBLEM — R91.8 PULMONARY NODULES: Status: ACTIVE | Noted: 2018-04-03

## 2023-12-26 NOTE — CONSULT LETTER
[Dear  ___] : Dear  [unfilled], [Consult Letter:] : I had the pleasure of evaluating your patient, [unfilled]. [Please see my note below.] : Please see my note below. [Consult Closing:] : Thank you very much for allowing me to participate in the care of this patient.  If you have any questions, please do not hesitate to contact me. [Sincerely,] : Sincerely, [FreeTextEntry3] : Ernesto Rasmussen MD Director of Interventional Pulmonology & Bronchoscopy .  Division of Pulmonary, Critical Care & Sleep Medicine [facility] at [facility].

## 2023-12-26 NOTE — ASSESSMENT
[FreeTextEntry1] :    The differential diagnosis of the nodule based on location, history and appearance including malignancy, infectious etiology, inflammatory etiology and other etiologies was discussed.  The diagnostic yield of robotic assisted navigation assisted bronchoscopy with biopsy as well as EBUS with biopsy was discussed with the patient. The risk and benefits of bronchoscopy with navigation assisted transbronchial biopsy including risk of bleeding and risk pneumothorax was discussed with the patient and they demonstrated understanding. In case of pneumothorax, we discussed the need for in hospital monitoring and chest tube placement. In case of bleeding, we explained that they may require inpatient or ICU admission. In case the lesion is suspicious for malignancy on preliminary or there is mediastinal or hilar adenopathy, the patient will need staging of the mediastinum with EBUS guided TBNA in the same procedure. The risk and benefits of EBUS including the risk of bleeding and risk of pneumothorax associated with EBUS was discussed with the patient and he demonstrated understanding. We will also send the tissue/BAL for culture to assess for infectious etiology during the procedure. The patient is agreeable to proceed with a navigation assisted bronchoscopy with biopsy of the lung lesion and EBUS with TBNA. The patient will undergo PST to assess candidacy of general anesthesia as well as discuss the risks and benefits with the anesthesiologist. Educational reading material regarding the procedure, risks and benefits provided to the patient in paper format.     Will need presurgical testing prior to scheduling the procedure. The office will co-ordinate testing and pre-surgical appointment. Will need medical clearance. If cardiac co-morbidities noted, may need additional cardiac clearance. Planned for flexible bronchoscopy, robotic assisted navigation bronchoscopy with biopsy of the lung lesion/rEBUS and evaluation of the hilum and mediastinum using linear EBUS on 1/9/24. Will need to stop anticoagulation and recheck coags prior to procedure.

## 2023-12-26 NOTE — HISTORY OF PRESENT ILLNESS
[Never] : never [TextBox_4] : Interventional Pulmonology Consultation/Visit Note  75-year-old female with known history of asthma and atrial fibrillation and also history of DVT PE with underlying protein S deficiency on Coumadin who presents to the interventional neurology clinic for persistent right upper lobe lung nodule.  The patient currently has no symptoms.  The nodule was noted on surveillance imaging and has been noted to persist.  The patient was therefore referred to the interventional pulmonology clinic for tissue biopsy to ascertain the etiology.  Patient denies any hemoptysis shortness of breath fever chills.

## 2023-12-26 NOTE — DISCUSSION/SUMMARY
[FreeTextEntry1] : The patients most recent CT scan was reviewed by my independently and my interpretation is stated below: Persistent right upper lobe nodule which is unchanged in size from May but increased in size from.  Previous CTs and new as compared to a CT scan in 2021. The differential diagnosis of the nodule based on location, history and appearance including malignancy, infectious etiology, inflammatory etiology and other etiologies was discussed.

## 2023-12-26 NOTE — PHYSICAL EXAM
[No Acute Distress] : no acute distress [Normal Oropharynx] : normal oropharynx [Normal Appearance] : normal appearance [No Neck Mass] : no neck mass [Normal Rate/Rhythm] : normal rate/rhythm [No Resp Distress] : no resp distress [Normal S1, S2] : normal s1, s2 [No Acc Muscle Use] : no acc muscle use [Clear to Auscultation Bilaterally] : clear to auscultation bilaterally [No Abnormalities] : no abnormalities [Benign] : benign [Normal Gait] : normal gait [No Clubbing] : no clubbing [Normal Color/ Pigmentation] : normal color/ pigmentation [No Focal Deficits] : no focal deficits [Oriented x3] : oriented x3 [Normal Mood] : normal mood [Normal Affect] : normal affect [Low Lying Soft Palate] : low lying soft palate [II] : Mallampati Class: II [No Murmurs] : no murmurs [No Cyanosis] : no cyanosis [No Edema] : no edema [FROM] : FROM [TextBox_11] : no errythema

## 2023-12-28 ENCOUNTER — OUTPATIENT (OUTPATIENT)
Dept: OUTPATIENT SERVICES | Facility: HOSPITAL | Age: 78
LOS: 1 days | End: 2023-12-28

## 2023-12-28 VITALS
HEIGHT: 62 IN | WEIGHT: 240.08 LBS | DIASTOLIC BLOOD PRESSURE: 89 MMHG | OXYGEN SATURATION: 97 % | HEART RATE: 65 BPM | TEMPERATURE: 98 F | SYSTOLIC BLOOD PRESSURE: 152 MMHG | RESPIRATION RATE: 16 BRPM

## 2023-12-28 DIAGNOSIS — D68.59 OTHER PRIMARY THROMBOPHILIA: ICD-10-CM

## 2023-12-28 DIAGNOSIS — Z98.890 OTHER SPECIFIED POSTPROCEDURAL STATES: Chronic | ICD-10-CM

## 2023-12-28 DIAGNOSIS — R91.1 SOLITARY PULMONARY NODULE: ICD-10-CM

## 2023-12-28 DIAGNOSIS — I83.93 ASYMPTOMATIC VARICOSE VEINS OF BILATERAL LOWER EXTREMITIES: Chronic | ICD-10-CM

## 2023-12-28 DIAGNOSIS — Z96.651 PRESENCE OF RIGHT ARTIFICIAL KNEE JOINT: Chronic | ICD-10-CM

## 2023-12-28 DIAGNOSIS — Z98.1 ARTHRODESIS STATUS: Chronic | ICD-10-CM

## 2023-12-28 DIAGNOSIS — Z98.89 OTHER SPECIFIED POSTPROCEDURAL STATES: Chronic | ICD-10-CM

## 2023-12-28 DIAGNOSIS — Z96.643 PRESENCE OF ARTIFICIAL HIP JOINT, BILATERAL: Chronic | ICD-10-CM

## 2023-12-28 DIAGNOSIS — I10 ESSENTIAL (PRIMARY) HYPERTENSION: ICD-10-CM

## 2023-12-28 DIAGNOSIS — Z91.89 OTHER SPECIFIED PERSONAL RISK FACTORS, NOT ELSEWHERE CLASSIFIED: ICD-10-CM

## 2023-12-28 DIAGNOSIS — J45.909 UNSPECIFIED ASTHMA, UNCOMPLICATED: ICD-10-CM

## 2023-12-28 DIAGNOSIS — Z90.49 ACQUIRED ABSENCE OF OTHER SPECIFIED PARTS OF DIGESTIVE TRACT: Chronic | ICD-10-CM

## 2023-12-28 LAB
ANION GAP SERPL CALC-SCNC: 12 MMOL/L — SIGNIFICANT CHANGE UP (ref 7–14)
ANION GAP SERPL CALC-SCNC: 12 MMOL/L — SIGNIFICANT CHANGE UP (ref 7–14)
APTT BLD: 40.7 SEC — HIGH (ref 24.5–35.6)
APTT BLD: 40.7 SEC — HIGH (ref 24.5–35.6)
BUN SERPL-MCNC: 20 MG/DL — SIGNIFICANT CHANGE UP (ref 7–23)
BUN SERPL-MCNC: 20 MG/DL — SIGNIFICANT CHANGE UP (ref 7–23)
CALCIUM SERPL-MCNC: 9.1 MG/DL — SIGNIFICANT CHANGE UP (ref 8.4–10.5)
CALCIUM SERPL-MCNC: 9.1 MG/DL — SIGNIFICANT CHANGE UP (ref 8.4–10.5)
CHLORIDE SERPL-SCNC: 102 MMOL/L — SIGNIFICANT CHANGE UP (ref 98–107)
CHLORIDE SERPL-SCNC: 102 MMOL/L — SIGNIFICANT CHANGE UP (ref 98–107)
CO2 SERPL-SCNC: 25 MMOL/L — SIGNIFICANT CHANGE UP (ref 22–31)
CO2 SERPL-SCNC: 25 MMOL/L — SIGNIFICANT CHANGE UP (ref 22–31)
CREAT SERPL-MCNC: 0.75 MG/DL — SIGNIFICANT CHANGE UP (ref 0.5–1.3)
CREAT SERPL-MCNC: 0.75 MG/DL — SIGNIFICANT CHANGE UP (ref 0.5–1.3)
EGFR: 82 ML/MIN/1.73M2 — SIGNIFICANT CHANGE UP
EGFR: 82 ML/MIN/1.73M2 — SIGNIFICANT CHANGE UP
GLUCOSE SERPL-MCNC: 86 MG/DL — SIGNIFICANT CHANGE UP (ref 70–99)
GLUCOSE SERPL-MCNC: 86 MG/DL — SIGNIFICANT CHANGE UP (ref 70–99)
HCT VFR BLD CALC: 37 % — SIGNIFICANT CHANGE UP (ref 34.5–45)
HCT VFR BLD CALC: 37 % — SIGNIFICANT CHANGE UP (ref 34.5–45)
HGB BLD-MCNC: 11.4 G/DL — LOW (ref 11.5–15.5)
HGB BLD-MCNC: 11.4 G/DL — LOW (ref 11.5–15.5)
INR BLD: 2.23 RATIO — HIGH (ref 0.85–1.18)
INR BLD: 2.23 RATIO — HIGH (ref 0.85–1.18)
MCHC RBC-ENTMCNC: 22 PG — LOW (ref 27–34)
MCHC RBC-ENTMCNC: 22 PG — LOW (ref 27–34)
MCHC RBC-ENTMCNC: 30.8 GM/DL — LOW (ref 32–36)
MCHC RBC-ENTMCNC: 30.8 GM/DL — LOW (ref 32–36)
MCV RBC AUTO: 71.3 FL — LOW (ref 80–100)
MCV RBC AUTO: 71.3 FL — LOW (ref 80–100)
NRBC # BLD: 0 /100 WBCS — SIGNIFICANT CHANGE UP (ref 0–0)
NRBC # BLD: 0 /100 WBCS — SIGNIFICANT CHANGE UP (ref 0–0)
NRBC # FLD: 0 K/UL — SIGNIFICANT CHANGE UP (ref 0–0)
NRBC # FLD: 0 K/UL — SIGNIFICANT CHANGE UP (ref 0–0)
PLATELET # BLD AUTO: 313 K/UL — SIGNIFICANT CHANGE UP (ref 150–400)
PLATELET # BLD AUTO: 313 K/UL — SIGNIFICANT CHANGE UP (ref 150–400)
POTASSIUM SERPL-MCNC: 4.2 MMOL/L — SIGNIFICANT CHANGE UP (ref 3.5–5.3)
POTASSIUM SERPL-MCNC: 4.2 MMOL/L — SIGNIFICANT CHANGE UP (ref 3.5–5.3)
POTASSIUM SERPL-SCNC: 4.2 MMOL/L — SIGNIFICANT CHANGE UP (ref 3.5–5.3)
POTASSIUM SERPL-SCNC: 4.2 MMOL/L — SIGNIFICANT CHANGE UP (ref 3.5–5.3)
PROTHROM AB SERPL-ACNC: 24.4 SEC — HIGH (ref 9.5–13)
PROTHROM AB SERPL-ACNC: 24.4 SEC — HIGH (ref 9.5–13)
RBC # BLD: 5.19 M/UL — SIGNIFICANT CHANGE UP (ref 3.8–5.2)
RBC # BLD: 5.19 M/UL — SIGNIFICANT CHANGE UP (ref 3.8–5.2)
RBC # FLD: 17 % — HIGH (ref 10.3–14.5)
RBC # FLD: 17 % — HIGH (ref 10.3–14.5)
SODIUM SERPL-SCNC: 139 MMOL/L — SIGNIFICANT CHANGE UP (ref 135–145)
SODIUM SERPL-SCNC: 139 MMOL/L — SIGNIFICANT CHANGE UP (ref 135–145)
WBC # BLD: 4.65 K/UL — SIGNIFICANT CHANGE UP (ref 3.8–10.5)
WBC # BLD: 4.65 K/UL — SIGNIFICANT CHANGE UP (ref 3.8–10.5)
WBC # FLD AUTO: 4.65 K/UL — SIGNIFICANT CHANGE UP (ref 3.8–10.5)
WBC # FLD AUTO: 4.65 K/UL — SIGNIFICANT CHANGE UP (ref 3.8–10.5)

## 2023-12-28 RX ORDER — FLUTICASONE PROPIONATE 50 MCG
2 SPRAY, SUSPENSION NASAL
Qty: 0 | Refills: 0 | DISCHARGE

## 2023-12-28 RX ORDER — SODIUM CHLORIDE 9 MG/ML
3 INJECTION INTRAMUSCULAR; INTRAVENOUS; SUBCUTANEOUS EVERY 8 HOURS
Refills: 0 | Status: DISCONTINUED | OUTPATIENT
Start: 2024-01-09 | End: 2024-01-23

## 2023-12-28 RX ORDER — LEVOCETIRIZINE DIHYDROCHLORIDE 0.5 MG/ML
1 SOLUTION ORAL
Qty: 0 | Refills: 0 | DISCHARGE

## 2023-12-28 RX ORDER — WARFARIN SODIUM 2.5 MG/1
1.5 TABLET ORAL
Qty: 0 | Refills: 0 | DISCHARGE

## 2023-12-28 RX ORDER — FLUTICASONE PROPIONATE AND SALMETEROL 50; 250 UG/1; UG/1
1 POWDER ORAL; RESPIRATORY (INHALATION)
Qty: 0 | Refills: 0 | DISCHARGE

## 2023-12-28 RX ORDER — SODIUM CHLORIDE 9 MG/ML
1000 INJECTION, SOLUTION INTRAVENOUS
Refills: 0 | Status: DISCONTINUED | OUTPATIENT
Start: 2024-01-09 | End: 2024-01-23

## 2023-12-28 RX ORDER — ALBUTEROL 90 UG/1
2 AEROSOL, METERED ORAL
Qty: 0 | Refills: 0 | DISCHARGE

## 2023-12-28 RX ORDER — NEBIVOLOL HYDROCHLORIDE 5 MG/1
1 TABLET ORAL
Qty: 0 | Refills: 0 | DISCHARGE

## 2023-12-28 RX ORDER — CHLORTHALIDONE 50 MG
1 TABLET ORAL
Qty: 0 | Refills: 0 | DISCHARGE

## 2023-12-28 RX ORDER — AMLODIPINE BESYLATE 2.5 MG/1
1 TABLET ORAL
Qty: 0 | Refills: 0 | DISCHARGE

## 2023-12-28 RX ORDER — SENNA PLUS 8.6 MG/1
2 TABLET ORAL
Qty: 0 | Refills: 0 | DISCHARGE

## 2023-12-28 NOTE — H&P PST ADULT - HISTORY OF PRESENT ILLNESS
77 y/o female with h/o Asthma, DVT/PE, Protein S deficiency (On Coumadin), HTN, PMR, fibromyalgia and thalassemia minor presents to PST preop for galaxy robotic navigational endobronchial ultrasound bronchoscopy/ radial/linear. Pt has persistent right upper lobe lung nodule that was noted on surveillance CT. She reports nodule has increased in size.

## 2023-12-28 NOTE — H&P PST ADULT - ASSESSMENT
79 y/o female with h/o Asthma, DVT/PE, Protein S deficiency (On Coumadin), HTN, PMR, fibromyalgia and thalassemia minor presents to PST preop for galaxy robotic navigational endobronchial ultrasound bronchoscopy/ radial/linear. Pt has persistent right upper lobe lung nodule that was noted on surveillance CT. She reports nodule has increased in size.   77 y/o female with h/o Asthma, DVT/PE, Protein S deficiency (On Coumadin), HTN, PMR, fibromyalgia and thalassemia minor presents to PST preop for galaxy robotic navigational endobronchial ultrasound bronchoscopy/ radial/linear. Pt has persistent right upper lobe lung nodule that was noted on surveillance CT. She reports nodule has increased in size.

## 2023-12-28 NOTE — H&P PST ADULT - RESPIRATORY AND THORAX COMMENTS
+asthma, stable. Pt denies recent exacerbation. Uses Advair inhaler daily. preop dx of solitary pulmonary nodule. see HPI

## 2023-12-28 NOTE — H&P PST ADULT - NSICDXPASTMEDICALHX_GEN_ALL_CORE_FT
PAST MEDICAL HISTORY:  Asthma well controlled, no recent hospitalizations    Fibromyalgia     First degree AV block     Flat feet, bilateral     [place] filter in place 2005    History of DVT (deep vein thrombosis) 2005, 2014, 2015    History of pulmonary embolism 2004    HTN (hypertension)     Lung nodule     Lymphedema of left leg     Neuropathy right foot    Obesity, morbid, BMI 40.0-49.9     Onychomycosis     Osteoarthritis     Osteoarthritis of right knee     PE (pulmonary embolism) s/p [place] Filter- 2005    Polymyalgia rheumatica     Protein S deficiency     Solitary pulmonary nodule     Spondylisthesis     Swollen ankles     Thalassemia minor      PAST MEDICAL HISTORY:  Asthma well controlled, no recent hospitalizations    Fibromyalgia     First degree AV block     Flat feet, bilateral     [place] filter in place 2005    History of DVT (deep vein thrombosis) 2005, 2014, 2015    History of pulmonary embolism 2004    HTN (hypertension)     Lung nodule     Lymphedema of left leg     Neuropathy right foot    Obesity, morbid, BMI 40.0-49.9     Onychomycosis     Osteoarthritis     Osteoarthritis of right knee     PE (pulmonary embolism) s/p [place] Filter- 2005    Polymyalgia rheumatica     Protein S deficiency     Solitary pulmonary nodule     Spondylisthesis     Swollen ankles     Thalassemia minor

## 2023-12-28 NOTE — H&P PST ADULT - PROBLEM SELECTOR PLAN 1
preop for galaxy robotic navigational endobronchial ultrasound bronchoscopy/ radial/linear on 1/9/24  preop instructions given, pt verbalized understanding  GI prophylaxis provided  cbc, bmp, pt, ptt, INR pending    Medical evaluation requested- pt reports new onset of SOB on exertion over the last 4 days with her routine activities. preop for galaxy robotic navigational endobronchial ultrasound bronchoscopy/ radial/linear on 1/9/24  preop instructions given, pt verbalized understanding  GI prophylaxis provided  cbc, bmp, pt, ptt, INR pending    Medical evaluation requested- pt reports new onset of dyspnea on exertion over the last 4 days with her routine activities. preop for galaxy robotic navigational endobronchial ultrasound bronchoscopy/ radial/linear on 1/9/24  preop instructions given, pt verbalized understanding  GI prophylaxis provided  cbc, bmp, pt, ptt, INR pending    Medical evaluation requested- pt reports new onset of dyspnea on exertion over the last 4 days with her routine physical activities and has a Mets <4.

## 2023-12-28 NOTE — H&P PST ADULT - PROBLEM SELECTOR PLAN 3
pt was instructed by Dr. Wood to hold coumadin 5 days prior to procedure. last dose 1/3/24  PT/INR to be assessed on admission

## 2023-12-28 NOTE — H&P PST ADULT - FUNCTIONAL STATUS
Activity: independent with ADLs, walks in home with cane, walks 1-2 blocks with rollator   Symptoms: recent dyspnea on exertion   Mets: 3.97 using DASI calculator

## 2023-12-28 NOTE — H&P PST ADULT - PROBLEM/PLAN-3
Patient instructed on the use of the 0-10 pain scale. Patient will use this scale to report the level of pain. Pain is being controlled at this time. DISPLAY PLAN FREE TEXT

## 2023-12-28 NOTE — H&P PST ADULT - OTHER CARE PROVIDERS
Internist/Cardiologist Dr. Ganesh Leong [phone]  Pulmonary Dr. Morales   Hematology Dr. Pinon    Rheumatology Dr. Adrienne Bueno Internist/Cardiologist Dr. Ganesh Leong [phone]  Pulmonary Dr. Morales   Hematology Dr. Pinon    Rheumatology Dr. Adrienne Bueno

## 2023-12-28 NOTE — H&P PST ADULT - CARDIOVASCULAR
details… regular rate and rhythm/S1 S2 present/no murmur/no JVD/peripheral edema/pedal edema/vascular

## 2023-12-28 NOTE — H&P PST ADULT - NSICDXPASTSURGICALHX_GEN_ALL_CORE_FT
PAST SURGICAL HISTORY:  [place] filter in place 2005    H/O bilateral hip replacements     H/O eye surgery left retinal tear, 2012    History of ankle fusion right, 2017    History of appendectomy 2015    History of hernia surgery 2013    History of total right knee replacement     S/P hysterectomy 1999    Varicose vein of leg cauterization left leg vein 1989     PAST SURGICAL HISTORY:  [place] filter in place 2005    H/O bilateral hip replacements     H/O eye surgery left retinal tear, 2012    History of ankle fusion right, 2017    History of appendectomy 2015    History of hernia surgery 2013    History of total right knee replacement     S/P hysterectomy 1999    Varicose vein of leg cauterization left leg vein 1989

## 2023-12-28 NOTE — H&P PST ADULT - CARDIOVASCULAR SYMPTOMS
pt reports recent SOB on exertion for the last 4 days./dyspnea on exertion/peripheral edema pt reports recent SOB on exertion for the last 4 days with routine activities./dyspnea on exertion/peripheral edema pt reports new onset dyspnea on exertion for the last 4 days with routine physical activities./dyspnea on exertion/peripheral edema

## 2024-01-02 ENCOUNTER — APPOINTMENT (OUTPATIENT)
Dept: INTERNAL MEDICINE | Facility: CLINIC | Age: 79
End: 2024-01-02
Payer: COMMERCIAL

## 2024-01-02 ENCOUNTER — NON-APPOINTMENT (OUTPATIENT)
Age: 79
End: 2024-01-02

## 2024-01-02 VITALS — DIASTOLIC BLOOD PRESSURE: 82 MMHG | SYSTOLIC BLOOD PRESSURE: 144 MMHG

## 2024-01-02 VITALS
HEART RATE: 70 BPM | HEIGHT: 62 IN | WEIGHT: 242 LBS | TEMPERATURE: 97.6 F | DIASTOLIC BLOOD PRESSURE: 79 MMHG | OXYGEN SATURATION: 97 % | SYSTOLIC BLOOD PRESSURE: 166 MMHG | BODY MASS INDEX: 44.53 KG/M2

## 2024-01-02 DIAGNOSIS — R91.1 SOLITARY PULMONARY NODULE: ICD-10-CM

## 2024-01-02 DIAGNOSIS — R06.09 OTHER FORMS OF DYSPNEA: ICD-10-CM

## 2024-01-02 DIAGNOSIS — Z01.818 ENCOUNTER FOR OTHER PREPROCEDURAL EXAMINATION: ICD-10-CM

## 2024-01-02 PROCEDURE — 99214 OFFICE O/P EST MOD 30 MIN: CPT | Mod: 25

## 2024-01-02 PROCEDURE — 93000 ELECTROCARDIOGRAM COMPLETE: CPT

## 2024-01-02 NOTE — PHYSICAL EXAM
[No Acute Distress] : no acute distress [Well-Appearing] : well-appearing [No Respiratory Distress] : no respiratory distress  [No Accessory Muscle Use] : no accessory muscle use [Clear to Auscultation] : lungs were clear to auscultation bilaterally [Normal Rate] : normal rate  [Regular Rhythm] : with a regular rhythm [Normal S1, S2] : normal S1 and S2 [Soft] : abdomen soft [Non Tender] : non-tender [Non-distended] : non-distended [Normal Bowel Sounds] : normal bowel sounds [Speech Grossly Normal] : speech grossly normal [Alert and Oriented x3] : oriented to person, place, and time [de-identified] : b/l LE edema, L > R. Khurram's sign negative

## 2024-01-02 NOTE — HISTORY OF PRESENT ILLNESS
[Asthma] : asthma [Chronic Anticoagulation] : chronic anticoagulation [Aortic Stenosis] : no aortic stenosis [Atrial Fibrillation] : no atrial fibrillation [Coronary Artery Disease] : no coronary artery disease [Recent Myocardial Infarction] : no recent myocardial infarction [Implantable Device/Pacemaker] : no implantable device/pacemaker [COPD] : no COPD [Sleep Apnea] : no sleep apnea [Smoker] : not a smoker [Family Member] : no family member with adverse anesthesia reaction/sudden death [Self] : no previous adverse anesthesia reaction [Chronic Kidney Disease] : no chronic kidney disease [Diabetes] : no diabetes [Moderate (4-6 METs)] : Moderate (4-6 METs) [FreeTextEntry1] : bronchoscopy [FreeTextEntry2] : 1/9/23 [FreeTextEntry3] : Dr Wood [FreeTextEntry4] : 77 yo F pmh OA, hx of DVT, thalassemia, protein S deficiency (on warfarin), fibromyalgia, HTN, lymphedema left leg, pulm nodules, asthma presents for preop clearance for galaxy robotic navigational endobronchial ultrasound bronchoscopy/ radial/linear on 1/9/23 with Dr Wood.  Patirent has persistent right upper lobe lung nodule that was noted on surveillance CT. Patient reports chronic teary eyes --> seen by ophtho. Was prescribed eye drops by ophtho.   Patient denies prior reactions to anesthesia in the past or in her family. She is taking coumadin for protein s deficiency. Denies NSAIDs or herbal supplements. Patient states she is able to walk 1 block without chest pain or SOB. Patient is unable to climb stairs due to knee pain. reports dyspnea on exertion and chest discomfort however patient did not take lyrica this morning. denies current SOB, headaches, dizziness, lightheadedness, fevers, chills, nausea, vomiting.   [FreeTextEntry6] : patient reports dyspnea on exertion

## 2024-01-02 NOTE — ASSESSMENT
[Cardiology consultation] : Cardiology consultation [FreeTextEntry4] : - Planned for galaxy robotic navigational endobronchial ultrasound bronchoscopy/ radial/linear on 1/9/23 with Dr Wood - bloodwork from PST reviewed.  - EKG today unchanged from prior - pending cardiac clearance due to dyspnea on exertion - chronic LE lymphedema, L > R - coumadin to be held 5 days prior to procedure . INR to be checked day prior to procedure 1/8/24. - HTN --- blood pressure improved from initial read. Patient did not take BP medications today. Continue losartan 50 mg QD and amlodipine 5 mg QD  - no prior adverse effects to anesthesia  - able to perform ~4METs - to avoid ASA, NSAIDs and vitamins for 1 weeks prior to surgery - patient medically optimized pending cardiac clearance

## 2024-01-02 NOTE — REVIEW OF SYSTEMS
[Fever] : no fever [Chills] : no chills [Fatigue] : no fatigue [Vision Problems] : no vision problems [Nasal Discharge] : no nasal discharge [Chest Pain] : no chest pain [Palpitations] : no palpitations [Lower Ext Edema] : lower extremity edema [Shortness Of Breath] : no shortness of breath [Wheezing] : no wheezing [Cough] : no cough [Dyspnea on Exertion] : dyspnea on exertion [Abdominal Pain] : no abdominal pain [Nausea] : no nausea [Diarrhea] : diarrhea [Vomiting] : no vomiting [Dysuria] : no dysuria [Frequency] : no frequency [Muscle Weakness] : no muscle weakness [Muscle Pain] : muscle pain [Headache] : no headache [Dizziness] : no dizziness [Fainting] : no fainting [FreeTextEntry5] : chronic lower extremity lymphedema, L>R [FreeTextEntry9] : chronic muscle pain 2/2 fibromyalgia

## 2024-01-08 ENCOUNTER — LABORATORY RESULT (OUTPATIENT)
Age: 79
End: 2024-01-08

## 2024-01-08 NOTE — ASU PATIENT PROFILE, ADULT - NSICDXPASTMEDICALHX_GEN_ALL_CORE_FT
PAST MEDICAL HISTORY:  Asthma well controlled, no recent hospitalizations    Fibromyalgia     First degree AV block     Flat feet, bilateral     [place] filter in place 2005    History of DVT (deep vein thrombosis) 2005, 2014, 2015    History of pulmonary embolism 2004    HTN (hypertension)     Lung nodule     Lymphedema of left leg     Neuropathy right foot    Obesity, morbid, BMI 40.0-49.9     Onychomycosis     Osteoarthritis     Osteoarthritis of right knee     PE (pulmonary embolism) s/p [place] Filter- 2005    Polymyalgia rheumatica     Protein S deficiency     Solitary pulmonary nodule     Spondylisthesis     Swollen ankles     Thalassemia minor      PAST MEDICAL HISTORY:  Asthma well controlled, no recent hospitalizations    Fibromyalgia     First degree AV block     Flat feet, bilateral     [place] filter in place 2005    History of DVT (deep vein thrombosis) 2005, 2014, 2015    History of pulmonary embolism 2004    HTN (hypertension)     Lung nodule     Lymphedema of left leg     Neuropathy right foot    Obesity, morbid, BMI 40.0-49.9     Onychomycosis     Osteoarthritis     Osteoarthritis of right knee     PE (pulmonary embolism) s/p [place] Filter- 2005    Polymyalgia rheumatica     Protein S deficiency     Solitary pulmonary nodule     Spondylisthesis     Swollen ankles     Thalassemia minor

## 2024-01-08 NOTE — ASU PATIENT PROFILE, ADULT - NSICDXPASTSURGICALHX_GEN_ALL_CORE_FT
PAST SURGICAL HISTORY:  [place] filter in place 2005    H/O bilateral hip replacements     H/O eye surgery left retinal tear, 2012    History of ankle fusion right, 2017    History of appendectomy 2015    History of hernia surgery 2013    History of total right knee replacement     S/P hysterectomy 1999    Varicose vein of leg cauterization left leg vein 1989     PAST SURGICAL HISTORY:  [place] filter in place 2005    H/O bilateral hip replacements     H/O eye surgery left retinal tear, 2012    History of ankle fusion right, 2017    History of appendectomy 2015    History of hernia surgery 2013    History of total right knee replacement     S/P hysterectomy 1999    Varicose vein of leg cauterization left leg vein 1989

## 2024-01-08 NOTE — ASU PATIENT PROFILE, ADULT - VISION (WITH CORRECTIVE LENSES IF THE PATIENT USUALLY WEARS THEM):
Anticoagulation. Partially impaired: cannot see medication labels or newsprint, but can see obstacles in path, and the surrounding layout; can count fingers at arm's length

## 2024-01-08 NOTE — ASU PATIENT PROFILE, ADULT - FALL HARM RISK - RISK INTERVENTIONS
Assistance OOB with selected safe patient handling equipment/Assistance with ambulation/Communicate Fall Risk and Risk Factors to all staff, patient, and family/Discuss with provider need for PT consult/Monitor gait and stability/Provide patient with walking aids - walker, cane, crutches/Reinforce activity limits and safety measures with patient and family/Visual Cue: Yellow wristband/Bed in lowest position, wheels locked, appropriate side rails in place/Call bell, personal items and telephone in reach/Instruct patient to call for assistance before getting out of bed or chair/Non-slip footwear when patient is out of bed/[place] to call system/Physically safe environment - no spills, clutter or unnecessary equipment/Purposeful Proactive Rounding/Room/bathroom lighting operational, light cord in reach Assistance OOB with selected safe patient handling equipment/Assistance with ambulation/Communicate Fall Risk and Risk Factors to all staff, patient, and family/Discuss with provider need for PT consult/Monitor gait and stability/Provide patient with walking aids - walker, cane, crutches/Reinforce activity limits and safety measures with patient and family/Visual Cue: Yellow wristband/Bed in lowest position, wheels locked, appropriate side rails in place/Call bell, personal items and telephone in reach/Instruct patient to call for assistance before getting out of bed or chair/Non-slip footwear when patient is out of bed/[place] to call system/Physically safe environment - no spills, clutter or unnecessary equipment/Purposeful Proactive Rounding/Room/bathroom lighting operational, light cord in reach

## 2024-01-09 ENCOUNTER — TRANSCRIPTION ENCOUNTER (OUTPATIENT)
Age: 79
End: 2024-01-09

## 2024-01-09 ENCOUNTER — OUTPATIENT (OUTPATIENT)
Dept: OUTPATIENT SERVICES | Facility: HOSPITAL | Age: 79
LOS: 1 days | Discharge: HOME | End: 2024-01-09
Payer: MEDICARE

## 2024-01-09 ENCOUNTER — RESULT REVIEW (OUTPATIENT)
Age: 79
End: 2024-01-09

## 2024-01-09 ENCOUNTER — APPOINTMENT (OUTPATIENT)
Dept: PULMONOLOGY | Facility: HOSPITAL | Age: 79
End: 2024-01-09
Payer: MEDICARE

## 2024-01-09 VITALS
DIASTOLIC BLOOD PRESSURE: 67 MMHG | HEART RATE: 68 BPM | TEMPERATURE: 98 F | WEIGHT: 240.08 LBS | HEIGHT: 62 IN | RESPIRATION RATE: 16 BRPM | OXYGEN SATURATION: 96 % | SYSTOLIC BLOOD PRESSURE: 154 MMHG

## 2024-01-09 VITALS
OXYGEN SATURATION: 96 % | SYSTOLIC BLOOD PRESSURE: 140 MMHG | RESPIRATION RATE: 18 BRPM | DIASTOLIC BLOOD PRESSURE: 68 MMHG | HEART RATE: 72 BPM

## 2024-01-09 DIAGNOSIS — Z90.49 ACQUIRED ABSENCE OF OTHER SPECIFIED PARTS OF DIGESTIVE TRACT: Chronic | ICD-10-CM

## 2024-01-09 DIAGNOSIS — Z98.1 ARTHRODESIS STATUS: Chronic | ICD-10-CM

## 2024-01-09 DIAGNOSIS — Z96.643 PRESENCE OF ARTIFICIAL HIP JOINT, BILATERAL: Chronic | ICD-10-CM

## 2024-01-09 DIAGNOSIS — R91.1 SOLITARY PULMONARY NODULE: ICD-10-CM

## 2024-01-09 DIAGNOSIS — Z98.890 OTHER SPECIFIED POSTPROCEDURAL STATES: Chronic | ICD-10-CM

## 2024-01-09 DIAGNOSIS — Z98.89 OTHER SPECIFIED POSTPROCEDURAL STATES: Chronic | ICD-10-CM

## 2024-01-09 DIAGNOSIS — I83.93 ASYMPTOMATIC VARICOSE VEINS OF BILATERAL LOWER EXTREMITIES: Chronic | ICD-10-CM

## 2024-01-09 DIAGNOSIS — Z96.651 PRESENCE OF RIGHT ARTIFICIAL KNEE JOINT: Chronic | ICD-10-CM

## 2024-01-09 LAB
APTT BLD: 34.6 SEC — SIGNIFICANT CHANGE UP (ref 24.5–35.6)
APTT BLD: 34.6 SEC — SIGNIFICANT CHANGE UP (ref 24.5–35.6)
GRAM STN FLD: SIGNIFICANT CHANGE UP
GRAM STN FLD: SIGNIFICANT CHANGE UP
INR BLD: 1.11 RATIO — SIGNIFICANT CHANGE UP (ref 0.85–1.18)
INR BLD: 1.11 RATIO — SIGNIFICANT CHANGE UP (ref 0.85–1.18)
PROTHROM AB SERPL-ACNC: 12.5 SEC — SIGNIFICANT CHANGE UP (ref 9.5–13)
PROTHROM AB SERPL-ACNC: 12.5 SEC — SIGNIFICANT CHANGE UP (ref 9.5–13)
SPECIMEN SOURCE: SIGNIFICANT CHANGE UP
SPECIMEN SOURCE: SIGNIFICANT CHANGE UP

## 2024-01-09 PROCEDURE — 31629 BRONCHOSCOPY/NEEDLE BX EACH: CPT | Mod: GC

## 2024-01-09 PROCEDURE — ZZZZZ: CPT

## 2024-01-09 PROCEDURE — 31624 DX BRONCHOSCOPE/LAVAGE: CPT | Mod: GC

## 2024-01-09 PROCEDURE — 31654 BRONCH EBUS IVNTJ PERPH LES: CPT | Mod: GC

## 2024-01-09 PROCEDURE — 88173 CYTOPATH EVAL FNA REPORT: CPT | Mod: 26,59

## 2024-01-09 PROCEDURE — 88333 PATH CONSLTJ SURG CYTO XM 1: CPT | Mod: 26,59

## 2024-01-09 PROCEDURE — 88172 CYTP DX EVAL FNA 1ST EA SITE: CPT | Mod: 26,59

## 2024-01-09 PROCEDURE — 31645 BRNCHSC W/THER ASPIR 1ST: CPT | Mod: GC

## 2024-01-09 PROCEDURE — 71045 X-RAY EXAM CHEST 1 VIEW: CPT | Mod: 26

## 2024-01-09 PROCEDURE — 88312 SPECIAL STAINS GROUP 1: CPT | Mod: 26

## 2024-01-09 PROCEDURE — 31627 NAVIGATIONAL BRONCHOSCOPY: CPT | Mod: GC

## 2024-01-09 PROCEDURE — 31628 BRONCHOSCOPY/LUNG BX EACH: CPT | Mod: GC

## 2024-01-09 PROCEDURE — 88305 TISSUE EXAM BY PATHOLOGIST: CPT | Mod: 26

## 2024-01-09 PROCEDURE — 88112 CYTOPATH CELL ENHANCE TECH: CPT | Mod: 26,59

## 2024-01-09 DEVICE — SYS GALAXY BRONCHOSCOPE SINGLE USE: Type: IMPLANTABLE DEVICE | Status: FUNCTIONAL

## 2024-01-09 DEVICE — PROBE CRYO FLEX 1.1X1150 MM SNGL USE: Type: IMPLANTABLE DEVICE | Status: FUNCTIONAL

## 2024-01-09 RX ORDER — LOSARTAN POTASSIUM 100 MG/1
1 TABLET, FILM COATED ORAL
Refills: 0 | DISCHARGE

## 2024-01-09 RX ORDER — AMLODIPINE BESYLATE 2.5 MG/1
1 TABLET ORAL
Refills: 0 | DISCHARGE

## 2024-01-09 RX ORDER — HYDROMORPHONE HYDROCHLORIDE 2 MG/ML
0.5 INJECTION INTRAMUSCULAR; INTRAVENOUS; SUBCUTANEOUS
Refills: 0 | Status: DISCONTINUED | OUTPATIENT
Start: 2024-01-09 | End: 2024-01-09

## 2024-01-09 RX ORDER — IPRATROPIUM/ALBUTEROL SULFATE 18-103MCG
3 AEROSOL WITH ADAPTER (GRAM) INHALATION ONCE
Refills: 0 | Status: COMPLETED | OUTPATIENT
Start: 2024-01-09 | End: 2024-01-09

## 2024-01-09 RX ORDER — FOLIC ACID 0.8 MG
0 TABLET ORAL
Refills: 0 | DISCHARGE

## 2024-01-09 RX ORDER — WARFARIN SODIUM 2.5 MG/1
1 TABLET ORAL
Refills: 0 | DISCHARGE

## 2024-01-09 RX ORDER — CHOLECALCIFEROL (VITAMIN D3) 125 MCG
0 CAPSULE ORAL
Refills: 0 | DISCHARGE

## 2024-01-09 RX ORDER — FENTANYL CITRATE 50 UG/ML
25 INJECTION INTRAVENOUS
Refills: 0 | Status: DISCONTINUED | OUTPATIENT
Start: 2024-01-09 | End: 2024-01-09

## 2024-01-09 RX ORDER — FLUTICASONE PROPIONATE AND SALMETEROL 50; 250 UG/1; UG/1
1 POWDER ORAL; RESPIRATORY (INHALATION)
Refills: 0 | DISCHARGE

## 2024-01-09 RX ORDER — ONDANSETRON 8 MG/1
4 TABLET, FILM COATED ORAL ONCE
Refills: 0 | Status: DISCONTINUED | OUTPATIENT
Start: 2024-01-09 | End: 2024-01-23

## 2024-01-09 RX ADMIN — SODIUM CHLORIDE 30 MILLILITER(S): 9 INJECTION, SOLUTION INTRAVENOUS at 15:55

## 2024-01-09 RX ADMIN — SODIUM CHLORIDE 3 MILLILITER(S): 9 INJECTION INTRAMUSCULAR; INTRAVENOUS; SUBCUTANEOUS at 16:20

## 2024-01-09 RX ADMIN — HYDROMORPHONE HYDROCHLORIDE 0.5 MILLIGRAM(S): 2 INJECTION INTRAMUSCULAR; INTRAVENOUS; SUBCUTANEOUS at 16:10

## 2024-01-09 RX ADMIN — Medication 3 MILLILITER(S): at 15:51

## 2024-01-09 RX ADMIN — HYDROMORPHONE HYDROCHLORIDE 0.5 MILLIGRAM(S): 2 INJECTION INTRAMUSCULAR; INTRAVENOUS; SUBCUTANEOUS at 16:25

## 2024-01-09 NOTE — ASU DISCHARGE PLAN (ADULT/PEDIATRIC) - ASU DC SPECIAL INSTRUCTIONSFT
You may experience cough or sorethroat over the next 24 hours, you may cough up some blood.   Please call the office if you develop shortness of breath, fevers, chest pain or cough up blood clots.

## 2024-01-09 NOTE — ASU DISCHARGE PLAN (ADULT/PEDIATRIC) - CARE PROVIDER_API CALL
Ernesto Rasmussen  Pulmonary Disease  [address] [place] [id number]  Phone: [phone]  Fax: [phone]  Follow Up Time: 1 month   Ernesto Rasmussen  Pulmonary Disease  [address] [place] [id number]  Phone: [phone]  Fax: [phone]  Follow Up Time: 1 month

## 2024-01-09 NOTE — ASU DISCHARGE PLAN (ADULT/PEDIATRIC) - FOLLOW UP APPOINTMENTS
[phone] may also call Recovery Room (PACU) 24/7 @ [phone]/[facility] may also call Recovery Room (PACU) 24/7 @ [phone]/[facility]

## 2024-01-09 NOTE — ASU DISCHARGE PLAN (ADULT/PEDIATRIC) - NS MD DC FALL RISK RISK
For information on Fall & Injury Prevention, visit: [email or web address] OR  [email or web address] OR  https://www.cdc.gov/steadi/patient.html For information on Fall & Injury Prevention, visit: [email or web address] OR  [email or web address] OR  https://www.cdc.gov/steadi/patient.html

## 2024-01-09 NOTE — ASU DISCHARGE PLAN (ADULT/PEDIATRIC) - PROVIDER TOKENS
PROVIDER:[TOKEN:[[id number]],FOLLOWUP:[1 month]] PROVIDER:[TOKEN:[[id number]],FOLLOWUP:[1 month]]

## 2024-01-10 ENCOUNTER — RX RENEWAL (OUTPATIENT)
Age: 79
End: 2024-01-10

## 2024-01-10 LAB
NIGHT BLUE STAIN TISS: SIGNIFICANT CHANGE UP
NIGHT BLUE STAIN TISS: SIGNIFICANT CHANGE UP
SPECIMEN SOURCE: SIGNIFICANT CHANGE UP
SPECIMEN SOURCE: SIGNIFICANT CHANGE UP

## 2024-01-11 LAB
CULTURE RESULTS: NO GROWTH — SIGNIFICANT CHANGE UP
CULTURE RESULTS: NO GROWTH — SIGNIFICANT CHANGE UP
SPECIMEN SOURCE: SIGNIFICANT CHANGE UP
SPECIMEN SOURCE: SIGNIFICANT CHANGE UP

## 2024-01-12 ENCOUNTER — APPOINTMENT (OUTPATIENT)
Dept: PULMONOLOGY | Facility: CLINIC | Age: 79
End: 2024-01-12
Payer: MEDICARE

## 2024-01-12 ENCOUNTER — NON-APPOINTMENT (OUTPATIENT)
Age: 79
End: 2024-01-12

## 2024-01-12 LAB
NON-GYNECOLOGICAL CYTOLOGY STUDY: SIGNIFICANT CHANGE UP
NON-GYNECOLOGICAL CYTOLOGY STUDY: SIGNIFICANT CHANGE UP

## 2024-01-12 PROCEDURE — 99442: CPT

## 2024-01-16 PROBLEM — D68.59 OTHER PRIMARY THROMBOPHILIA: Chronic | Status: ACTIVE | Noted: 2023-12-28

## 2024-01-16 PROBLEM — M79.7 FIBROMYALGIA: Chronic | Status: ACTIVE | Noted: 2023-12-28

## 2024-01-16 PROBLEM — B35.1 TINEA UNGUIUM: Chronic | Status: ACTIVE | Noted: 2023-12-28

## 2024-01-19 ENCOUNTER — LABORATORY RESULT (OUTPATIENT)
Age: 79
End: 2024-01-19

## 2024-01-23 RX ORDER — FLUTICASONE PROPIONATE AND SALMETEROL 250; 50 UG/1; UG/1
250-50 POWDER RESPIRATORY (INHALATION)
Qty: 1 | Refills: 1 | Status: ACTIVE | COMMUNITY
Start: 2024-01-23 | End: 1900-01-01

## 2024-01-23 RX ORDER — FLUTICASONE PROPIONATE AND SALMETEROL 50; 250 UG/1; UG/1
250-50 POWDER RESPIRATORY (INHALATION)
Qty: 60 | Refills: 5 | Status: DISCONTINUED | COMMUNITY
Start: 2020-03-25 | End: 2024-01-23

## 2024-01-24 ENCOUNTER — LABORATORY RESULT (OUTPATIENT)
Age: 79
End: 2024-01-24

## 2024-01-26 ENCOUNTER — APPOINTMENT (OUTPATIENT)
Dept: ENDOCRINOLOGY | Facility: CLINIC | Age: 79
End: 2024-01-26

## 2024-01-26 PROBLEM — R91.1 SOLITARY PULMONARY NODULE: Chronic | Status: ACTIVE | Noted: 2023-12-28

## 2024-01-26 PROBLEM — I44.0 ATRIOVENTRICULAR BLOCK, FIRST DEGREE: Chronic | Status: ACTIVE | Noted: 2023-12-28

## 2024-01-26 PROBLEM — I89.0 LYMPHEDEMA, NOT ELSEWHERE CLASSIFIED: Chronic | Status: ACTIVE | Noted: 2023-12-28

## 2024-01-26 PROBLEM — D56.3 THALASSEMIA MINOR: Chronic | Status: ACTIVE | Noted: 2023-12-28

## 2024-01-29 ENCOUNTER — APPOINTMENT (OUTPATIENT)
Dept: OPHTHALMOLOGY | Facility: CLINIC | Age: 79
End: 2024-01-29
Payer: MEDICARE

## 2024-01-29 ENCOUNTER — NON-APPOINTMENT (OUTPATIENT)
Age: 79
End: 2024-01-29

## 2024-01-29 PROCEDURE — 92012 INTRM OPH EXAM EST PATIENT: CPT

## 2024-01-29 PROCEDURE — 92134 CPTRZ OPH DX IMG PST SGM RTA: CPT

## 2024-02-07 LAB
CULTURE RESULTS: SIGNIFICANT CHANGE UP
SPECIMEN SOURCE: SIGNIFICANT CHANGE UP

## 2024-02-21 ENCOUNTER — RX RENEWAL (OUTPATIENT)
Age: 79
End: 2024-02-21

## 2024-02-24 LAB
CULTURE RESULTS: SIGNIFICANT CHANGE UP
SPECIMEN SOURCE: SIGNIFICANT CHANGE UP

## 2024-02-25 ENCOUNTER — LABORATORY RESULT (OUTPATIENT)
Age: 79
End: 2024-02-25

## 2024-03-04 ENCOUNTER — APPOINTMENT (OUTPATIENT)
Dept: OPHTHALMOLOGY | Facility: CLINIC | Age: 79
End: 2024-03-04
Payer: MEDICARE

## 2024-03-04 ENCOUNTER — NON-APPOINTMENT (OUTPATIENT)
Age: 79
End: 2024-03-04

## 2024-03-04 PROCEDURE — 68761 CLOSE TEAR DUCT OPENING: CPT | Mod: E2,E4

## 2024-03-04 PROCEDURE — 92012 INTRM OPH EXAM EST PATIENT: CPT | Mod: 25

## 2024-03-14 ENCOUNTER — APPOINTMENT (OUTPATIENT)
Dept: INTERNAL MEDICINE | Facility: CLINIC | Age: 79
End: 2024-03-14
Payer: MEDICARE

## 2024-03-14 VITALS
OXYGEN SATURATION: 98 % | HEIGHT: 62 IN | HEART RATE: 74 BPM | DIASTOLIC BLOOD PRESSURE: 79 MMHG | SYSTOLIC BLOOD PRESSURE: 117 MMHG | WEIGHT: 242 LBS | TEMPERATURE: 97.7 F | BODY MASS INDEX: 44.53 KG/M2

## 2024-03-14 DIAGNOSIS — J02.9 ACUTE PHARYNGITIS, UNSPECIFIED: ICD-10-CM

## 2024-03-14 DIAGNOSIS — J18.9 PNEUMONIA, UNSPECIFIED ORGANISM: ICD-10-CM

## 2024-03-14 DIAGNOSIS — I10 ESSENTIAL (PRIMARY) HYPERTENSION: ICD-10-CM

## 2024-03-14 DIAGNOSIS — D68.59 OTHER PRIMARY THROMBOPHILIA: ICD-10-CM

## 2024-03-14 DIAGNOSIS — R91.8 OTHER NONSPECIFIC ABNORMAL FINDING OF LUNG FIELD: ICD-10-CM

## 2024-03-14 LAB — S PYO AG SPEC QL IA: NEGATIVE

## 2024-03-14 PROCEDURE — 99214 OFFICE O/P EST MOD 30 MIN: CPT

## 2024-03-14 PROCEDURE — 87880 STREP A ASSAY W/OPTIC: CPT | Mod: QW

## 2024-03-14 PROCEDURE — G2211 COMPLEX E/M VISIT ADD ON: CPT

## 2024-03-14 NOTE — HISTORY OF PRESENT ILLNESS
[FreeTextEntry1] : follow up [de-identified] : 79 yo F pmh OA, hx of DVT, thalassemia, protein S deficiency (on warfarin), fibromyalgia, HTN, lymphedema left leg, pulm nodules, asthma presents for follow up Reports dry cough, sore throat, post nasal drip causing burning in chest region for the past 2 weeks. Denies fevers, muscle aches, chills, SOB, nausea, vomiting, diarrhea, sick contacts or recent travel.  Home covid test negative.  Has been seeing dentist --> needs root canal of 1 tooth.  Cardio Dr Leong --> last seen Jan 2024 --> had carotid doppler

## 2024-03-14 NOTE — PHYSICAL EXAM
[No Acute Distress] : no acute distress [Normal Outer Ear/Nose] : the outer ears and nose were normal in appearance [Well-Appearing] : well-appearing [Normal Oropharynx] : the oropharynx was normal [Normal TMs] : both tympanic membranes were normal [Normal Rate] : normal rate  [Supple] : supple [Normal S1, S2] : normal S1 and S2 [Regular Rhythm] : with a regular rhythm [Speech Grossly Normal] : speech grossly normal [de-identified] : coarse breath sounds b/l lung fields [Alert and Oriented x3] : oriented to person, place, and time

## 2024-03-14 NOTE — PLAN
[FreeTextEntry1] : Cough, sore throat - coarse breath sounds in b/l lung fields --> zpack sent to pharmacy - rapid strep today negative - f/u influenza, covid, rsv - flonase for post nasal drip. continue inhalers. - symptomatic management --> robitussin for cough, salt water gargles, steaming for congestion. Advised to continue hydration with water, gatorade/pedialyte, ambulate frequently and monitor pulse ox. May take vitamin c and vitamin d. - call back if worsening or persistent symptoms  Protein S deficiency - on coumadin 5 mg Mon-Fri and 7.5 mg Sat-Sun. Goal INR 2-3 - home draw set up for this month to check INR  HTN - Blood pressure at goal - continue losartan 50 mg QD and amlodipine 5mg QD - advised to log BP at home. - advised on dietary changes, increasing exercise, avoiding excess salt  Pulm nodules, asthma - CT chest May 2023. Stable findings. Unchanged indeterminate 1.2 cm solid right upper lobe nodule - pulm Dr Ozzie Billings - continue wixela and albuterol PRN  Hx of thalassemia - heme  [place]il  Fibromyalgia and OA - continue lyrica - Rheum Dr Ciaran Bueno - DEXA June 2020.

## 2024-03-14 NOTE — REVIEW OF SYSTEMS
[Fever] : no fever [Chills] : no chills [Fatigue] : no fatigue [Vision Problems] : no vision problems [Nasal Discharge] : no nasal discharge [Sore Throat] : sore throat [Postnasal Drip] : postnasal drip [Chest Pain] : no chest pain [Palpitations] : no palpitations [Lower Ext Edema] : no lower extremity edema [Shortness Of Breath] : no shortness of breath [Cough] : cough [Wheezing] : no wheezing [Dyspnea on Exertion] : no dyspnea on exertion [Nausea] : no nausea [Abdominal Pain] : no abdominal pain [Diarrhea] : diarrhea [Vomiting] : no vomiting [Dysuria] : no dysuria [Hematuria] : no hematuria [Frequency] : no frequency [Muscle Weakness] : no muscle weakness [Muscle Pain] : no muscle pain [Skin Rash] : no skin rash [Dizziness] : no dizziness [Headache] : no headache [Fainting] : no fainting

## 2024-03-15 LAB
INFLUENZA A RESULT: NOT DETECTED
INFLUENZA B RESULT: NOT DETECTED
RESP SYN VIRUS RESULT: NOT DETECTED
SARS-COV-2 RESULT: NOT DETECTED

## 2024-03-17 ENCOUNTER — NON-APPOINTMENT (OUTPATIENT)
Age: 79
End: 2024-03-17

## 2024-03-17 ENCOUNTER — TRANSCRIPTION ENCOUNTER (OUTPATIENT)
Age: 79
End: 2024-03-17

## 2024-03-19 PROBLEM — R91.8 LUNG NODULES: Status: ACTIVE | Noted: 2021-12-07

## 2024-03-25 ENCOUNTER — LABORATORY RESULT (OUTPATIENT)
Age: 79
End: 2024-03-25

## 2024-03-26 ENCOUNTER — LABORATORY RESULT (OUTPATIENT)
Age: 79
End: 2024-03-26

## 2024-03-26 ENCOUNTER — APPOINTMENT (OUTPATIENT)
Dept: INTERNAL MEDICINE | Facility: CLINIC | Age: 79
End: 2024-03-26
Payer: MEDICARE

## 2024-03-26 DIAGNOSIS — R09.82 POSTNASAL DRIP: ICD-10-CM

## 2024-03-26 DIAGNOSIS — R05.9 COUGH, UNSPECIFIED: ICD-10-CM

## 2024-03-26 PROCEDURE — 99443: CPT

## 2024-03-26 RX ORDER — LEVOCETIRIZINE DIHYDROCHLORIDE 5 MG/1
5 TABLET ORAL
Qty: 1 | Refills: 1 | Status: ACTIVE | COMMUNITY
Start: 2019-05-21 | End: 1900-01-01

## 2024-03-26 RX ORDER — AZITHROMYCIN 250 MG/1
250 TABLET, FILM COATED ORAL
Qty: 1 | Refills: 0 | Status: DISCONTINUED | COMMUNITY
Start: 2024-03-14 | End: 2024-03-26

## 2024-04-09 NOTE — PATIENT PROFILE ADULT. - PAIN, FACTORS THAT RELIEVE, PROFILE
Assessment:  Pio Navarro is a 51 y.o. male with a chief complaint of No chief complaint on file.    Encounter Diagnoses   Name Primary?    Primary osteoarthritis of left knee     Chronic pain of left knee     Injury of articular cartilage of left knee       Plan:  Left knee Synvisc + LP-PRP injection today.  Proper protocols after the injection included: no submerging pools, baths tubs, or hot tubs for 24 hr.  Showering is okay today.  Side effects of the corticosteroid injection can include elevated blood glucose levels and blood pressures, so if you are taking medications for these, please monitor closely, and contact your PCP if any issues.  Red flag symptoms include fever, chills, nausea, vomiting, red, warm, tender joint at the area of injection.  If you are noticing these symptoms, they may be indicative of an infection, and please seek medical care immediately, either by calling our clinic or going to the emergency room.  See post-PRP protocol below  We will finish Synvisc series over the next 3 weeks.  Then, we will re-evaluate in 6 weeks to determine progress.  May consider for repeat PRP injection at that time.    Follow-up: 1 week for Synvisc injection 2/3 or sooner if there are any problems between now and then.    Thank you for choosing [facility] and Dr. Jamaal Tijerina for your orthopedic & sports medicine care. It is our goal to provide you with exceptional care that will help keep you healthy, active, and get you back in the game.    Please do not hesitate to reach out to us via email, phone, or MyChart with any questions, concerns, or feedback.    If you are experiencing pain/discomfort ,or have questions after 5pm and would like to be connected to the [facility] on-call team, please call this number and specify which Sports Medicine provider is treating you: [phone]     PRP Post-Injection Protocol    [facility]     I.  Acute Phase - Day 1 to 7    1. Protected Rest: Use prescribed support as outlined below.    2. Use pain medications as prescribed, sometimes patient feel soreness during the healing phase of PRP and Prolotherapy.    3. NSAIDs: Try not to use anti-inflammatory medications for one week. Remember, joint inflammation is encouraged as part of the repair process.    4. Try not to ICE the area. Moist heat is acceptable.    5. This is the most critical time, as the injected area is now actively trying to heal. Overuse (sport, exercise, housework) during this time can potentially impair the healing process.    II. Subacute Phase Day 7-14    1. Begin gentle and pain free range of motion.    2. Now you may gradually begin to return to home activities as tolerated. This may take several days. Continue to have patience. Every person heals at different rates.    3. Now is the time to focus on good motion, mechanics/technique.    4. By day 14 and beyond, you may progress to pain free return to sport or previous level of training. You may need to start at a lower activity level, and gradually increase as tolerated.    5. You will follow up with Dr. Tijerina in 1 week    6. REMEMBER - Crosstraining and flexibility are to continue even after you feel relief of your symptoms. Balance and flexible joints are important to a healthy you.      MEDICATION:  Tylenol    These are the general guidelines, actual times may vary on extent of injury and may be modified in your personaliz plan of care.     medications

## 2024-04-22 NOTE — ED ADULT NURSE NOTE - PAIN RATING/NUMBER SCALE (0-10): REST
PULMONOLOGY CLINIC NOTE      Name: Indra Roche  MRN: [id number]  Last seen:  By me on 04/01/2024        PROBLEM LIST     Recurrent left pleural effusion status post PleurX catheter placement    Left lung mass - PET positive     Left upper lobe nodule PET positive    Left supraclavicular lymph node PET positive-but small in size.    History of squamous cell cancer of the tongue     Severe protein energy malnutrition      RECOMMENDATIONS     Pleural effusion -this is lymphocytic predominant exudative pleural effusion based on light's criteria.  Due to previous history of cancer and lymphocytic predominant, malignancy is highest on the differential.      Pleural fluid from 02/23/2024 was also lymphocytic predominant  Pleural fluid cytology from 02/23/2024 was negative        Pleural fluid  from 3/28  - pH 7.66, glucose 109, cell count with differential -total nucleated cells 676% lymphocytes predominant  - , Protein 3.8  - Cultures -no organism seen  - Cytology--negative     Serum should be sent for:   - , Protein 7.0    S/p pleurex catheter placement on 04/04/2024    Status post PET scan on 04/18/2024    Discussed with interventional radiology team.  Plan to get CT-guided lung biopsy from the right upper lobe.  If this is cancer then patient has stage IV lung cancer.    Patient was told to stop baby aspirin from tomorrow.    Will see patient in clinic with biopsy result.  After biopsy result, patient will be referred to oncologist    Consider to decrease metoprolol and Imdur dose---will send message to PCP and cardiologist           Subjective: F/U recurrent pleural effusion and PET scan result discussion.      Daughter accompanied patient for this encounter.    His wife is in rehab.    Since last visit, he was admitted to the hospital.      Patient was admitted from 04/06/2024 to 04/08/2024 due to orthostatic hypotension    Patient continues to be weak.  Poor appetite  Drinks 2-3 ensure with protein  powder.    Endorses cough.    He is getting drained x2 per week      There is no interval change in his past medical history information.  I have reviewed the information in the social history and family history and there are no changes.    Review of Systems   Constitutional:  Positive for poor activity, poor appetite and weight loss.  Negative for fever, chills, diaphoresis  HENT: Negative for congestion, postnasal drip, rhinorrhea, trouble swallowing and voice change.    Eyes: Negative for photophobia and visual disturbance.   Respiratory:  Positive for cough and shortness of breath.    Cardiovascular: Negative for chest pain and palpitations.   Gastrointestinal: Negative for abdominal pain, diarrhea, constipation and abdominal distention.   Endocrine: Negative for cold intolerance and heat intolerance.   Genitourinary: Negative for dysuria, urgency and hematuria.   Musculoskeletal: Negative for myalgias and arthralgias.   Skin: Negative for rash.   Allergic/Immunologic: Negative for immunocompromised state.   Neurological: Negative for dizziness, weakness and numbness.   Hematological: Does not bruise/bleed easily.   Psychiatric/Behavioral: Negative for suicidal ideas, hallucinations and agitation.       EXAM     Physical Exam:  Visit Vitals  BP 94/50   Pulse 88   Temp 98.3 °F (36.8 °C) (Temporal)   Resp 16   Ht 5' 11\" (1.803 m)   Wt 56 kg (123 lb 7.3 oz)   SpO2 94%   BMI 17.22 kg/m²       General:  Patient is cachectic.  alert, oriented X 3, not in acute distress,  HENT: oral mucosa moist, atraumatic, normocephalic,  Neck: No JVD, carotid 3+ pulses, no thyromegaly  Lungs:  Decreased air entry in the left half of the chest.  Breathing is not labored  CVS: RRR, S1,S2 well heard,  no murmur,  EXT: Normal range of movement and no joint pain.  Skin: Warm and no rashes  Neuro: No focal weakness, ambulating, face symmetrical        DATA REVIEW     Radiology:      PET scan on 04/18/2024  1. Multiple sites of abnormal  metabolic activity in the chest. There is  extensive consolidation in the left lower lobe extending from the left  hilum to the posterior and lateral pleural surfaces with marked increased  metabolic activity. There are multiple other pulmonary nodules throughout  the lungs including in the right upper lobe and lingula which are also  metabolically active. There are metabolically active left supraclavicular,  mediastinal, hilar, paraesophageal lymph nodes.  2. Abnormal metabolic activity in abdominal lymph nodes including upper  abdominal and retroperitoneal lymph nodes. In addition, there is a 4.3 cm  metabolically active mass in the mid abdomen mesentery.                  Total time- 21  minutes of my time with the patient.  Most of the time was spent counseling and coordinating patient's care.This time was Spent using following activities for the patient.  Preparing to see patient (I;e review of test)  Obtaining and/ or reviewing separately obtained history  Counseling and educating the patient/family/caregiver  Documenting clinical information in the electronic or other health record     Willi Martin MD         2

## 2024-04-26 NOTE — ED PROVIDER NOTE - NEURO NEGATIVE STATEMENT, MLM
DISPLAY PLAN FREE TEXT no loss of consciousness, no gait abnormality, no headache, no sensory deficits, and no weakness.

## 2024-04-27 ENCOUNTER — LABORATORY RESULT (OUTPATIENT)
Age: 79
End: 2024-04-27

## 2024-04-27 ENCOUNTER — RX RENEWAL (OUTPATIENT)
Age: 79
End: 2024-04-27

## 2024-04-28 RX ORDER — AMLODIPINE BESYLATE 5 MG/1
5 TABLET ORAL
Qty: 90 | Refills: 2 | Status: ACTIVE | COMMUNITY
Start: 2022-01-18 | End: 1900-01-01

## 2024-05-10 ENCOUNTER — APPOINTMENT (OUTPATIENT)
Dept: ORTHOPEDIC SURGERY | Facility: CLINIC | Age: 79
End: 2024-05-10
Payer: MEDICARE

## 2024-05-10 VITALS — HEIGHT: 62 IN | BODY MASS INDEX: 38.64 KG/M2 | WEIGHT: 210 LBS

## 2024-05-10 DIAGNOSIS — M17.11 UNILATERAL PRIMARY OSTEOARTHRITIS, RIGHT KNEE: ICD-10-CM

## 2024-05-10 DIAGNOSIS — M25.551 PAIN IN RIGHT HIP: ICD-10-CM

## 2024-05-10 PROCEDURE — 99203 OFFICE O/P NEW LOW 30 MIN: CPT

## 2024-05-10 PROCEDURE — 72100 X-RAY EXAM L-S SPINE 2/3 VWS: CPT

## 2024-05-10 PROCEDURE — 73502 X-RAY EXAM HIP UNI 2-3 VIEWS: CPT | Mod: RT

## 2024-05-30 ENCOUNTER — LABORATORY RESULT (OUTPATIENT)
Age: 79
End: 2024-05-30

## 2024-05-31 ENCOUNTER — NON-APPOINTMENT (OUTPATIENT)
Age: 79
End: 2024-05-31

## 2024-05-31 RX ORDER — LOSARTAN POTASSIUM 50 MG/1
50 TABLET, FILM COATED ORAL
Qty: 90 | Refills: 1 | Status: ACTIVE | COMMUNITY
Start: 2021-09-30 | End: 1900-01-01

## 2024-06-10 ENCOUNTER — APPOINTMENT (OUTPATIENT)
Dept: OPHTHALMOLOGY | Facility: CLINIC | Age: 79
End: 2024-06-10

## 2024-06-14 RX ORDER — PREGABALIN 75 MG/1
75 CAPSULE ORAL
Qty: 60 | Refills: 0 | Status: ACTIVE | COMMUNITY
Start: 2017-02-02 | End: 1900-01-01

## 2024-07-17 ENCOUNTER — APPOINTMENT (OUTPATIENT)
Dept: CT IMAGING | Facility: IMAGING CENTER | Age: 79
End: 2024-07-17
Payer: MEDICARE

## 2024-07-17 ENCOUNTER — OUTPATIENT (OUTPATIENT)
Dept: OUTPATIENT SERVICES | Facility: HOSPITAL | Age: 79
LOS: 1 days | End: 2024-07-17
Payer: MEDICARE

## 2024-07-17 DIAGNOSIS — Z98.89 OTHER SPECIFIED POSTPROCEDURAL STATES: Chronic | ICD-10-CM

## 2024-07-17 DIAGNOSIS — I83.93 ASYMPTOMATIC VARICOSE VEINS OF BILATERAL LOWER EXTREMITIES: Chronic | ICD-10-CM

## 2024-07-17 DIAGNOSIS — Z96.651 PRESENCE OF RIGHT ARTIFICIAL KNEE JOINT: Chronic | ICD-10-CM

## 2024-07-17 DIAGNOSIS — Z98.1 ARTHRODESIS STATUS: Chronic | ICD-10-CM

## 2024-07-17 DIAGNOSIS — Z96.643 PRESENCE OF ARTIFICIAL HIP JOINT, BILATERAL: Chronic | ICD-10-CM

## 2024-07-17 DIAGNOSIS — Z00.8 ENCOUNTER FOR OTHER GENERAL EXAMINATION: ICD-10-CM

## 2024-07-17 PROCEDURE — 71250 CT THORAX DX C-: CPT

## 2024-07-17 PROCEDURE — 71250 CT THORAX DX C-: CPT | Mod: 26

## 2024-07-18 ENCOUNTER — APPOINTMENT (OUTPATIENT)
Dept: MAMMOGRAPHY | Facility: IMAGING CENTER | Age: 79
End: 2024-07-18
Payer: MEDICARE

## 2024-07-18 ENCOUNTER — APPOINTMENT (OUTPATIENT)
Dept: ULTRASOUND IMAGING | Facility: IMAGING CENTER | Age: 79
End: 2024-07-18

## 2024-07-18 ENCOUNTER — OUTPATIENT (OUTPATIENT)
Dept: OUTPATIENT SERVICES | Facility: HOSPITAL | Age: 79
LOS: 1 days | End: 2024-07-18
Payer: MEDICARE

## 2024-07-18 DIAGNOSIS — Z98.890 OTHER SPECIFIED POSTPROCEDURAL STATES: Chronic | ICD-10-CM

## 2024-07-18 DIAGNOSIS — Z96.651 PRESENCE OF RIGHT ARTIFICIAL KNEE JOINT: Chronic | ICD-10-CM

## 2024-07-18 DIAGNOSIS — Z90.49 ACQUIRED ABSENCE OF OTHER SPECIFIED PARTS OF DIGESTIVE TRACT: Chronic | ICD-10-CM

## 2024-07-18 DIAGNOSIS — Z98.89 OTHER SPECIFIED POSTPROCEDURAL STATES: Chronic | ICD-10-CM

## 2024-07-18 DIAGNOSIS — I83.93 ASYMPTOMATIC VARICOSE VEINS OF BILATERAL LOWER EXTREMITIES: Chronic | ICD-10-CM

## 2024-07-18 DIAGNOSIS — Z96.643 PRESENCE OF ARTIFICIAL HIP JOINT, BILATERAL: Chronic | ICD-10-CM

## 2024-07-18 DIAGNOSIS — Z98.1 ARTHRODESIS STATUS: Chronic | ICD-10-CM

## 2024-07-18 DIAGNOSIS — Z00.8 ENCOUNTER FOR OTHER GENERAL EXAMINATION: ICD-10-CM

## 2024-07-18 PROCEDURE — 77063 BREAST TOMOSYNTHESIS BI: CPT | Mod: 26

## 2024-07-18 PROCEDURE — 77067 SCR MAMMO BI INCL CAD: CPT

## 2024-07-18 PROCEDURE — 77067 SCR MAMMO BI INCL CAD: CPT | Mod: 26

## 2024-07-18 PROCEDURE — 76641 ULTRASOUND BREAST COMPLETE: CPT | Mod: 26,50

## 2024-07-18 PROCEDURE — 77063 BREAST TOMOSYNTHESIS BI: CPT

## 2024-07-18 PROCEDURE — 76641 ULTRASOUND BREAST COMPLETE: CPT

## 2024-08-01 ENCOUNTER — LABORATORY RESULT (OUTPATIENT)
Age: 79
End: 2024-08-01

## 2024-08-22 ENCOUNTER — RX RENEWAL (OUTPATIENT)
Age: 79
End: 2024-08-22

## 2024-08-22 DIAGNOSIS — M79.7 FIBROMYALGIA: ICD-10-CM

## 2024-08-22 RX ORDER — PREGABALIN 75 MG/1
75 CAPSULE ORAL
Qty: 60 | Refills: 0 | Status: ACTIVE | COMMUNITY
Start: 2024-08-22 | End: 1900-01-01

## 2024-08-28 ENCOUNTER — NON-APPOINTMENT (OUTPATIENT)
Age: 79
End: 2024-08-28

## 2024-09-04 ENCOUNTER — LABORATORY RESULT (OUTPATIENT)
Age: 79
End: 2024-09-04

## 2024-09-11 ENCOUNTER — RX RENEWAL (OUTPATIENT)
Age: 79
End: 2024-09-11

## 2024-09-11 NOTE — REVIEW OF SYSTEMS
Goal Outcome Evaluation:      Plan of Care Reviewed With: patient    Overall Patient Progress: decliningOverall Patient Progress: declining    Outcome Evaluation: Some delirium this shift, pain partially managed with repositioning and cold packs    Status: S/p Thoracic 7-11 minimally invasive percutaneous posterior instrumented fusion.   Vitals: VSS  Neuros: A&Ox4, needs cuing with orientation questions, strengths 5/5 throughout, numbness to bilateral feet at baseline  IV: PIV infusing Insulin gtt at 0.5 units/hr, in algorithm 1   Labs/Electrolytes: WNL   Resp/trach: LSC on RA   Diet: Regular, declined dinner this shift   Bowel status: LBM 9/10, BS+  : Voiding spontaneously, strains to void   Skin: Posterior back incision covered with primapore, dressing marked   Pain: Back pain partially managed with scheduled meds, repositioning, and cold packs   Activity: Ax1 with GB walker   Social: Pt updating family independently   Plan/Updates this shift: Insulin drip was stopped d/t pt pulling out IV and no new bag available. Team notified and drip was restarted at algorithm 1. Mild delirium this shift, needed frequent reminders about where we are, team aware and delirium precautions ordered and scopolamine patch discontinued. Continue with POC.        [Edema] : ~T edema was not present [Negative] : Sleep Disorder

## 2024-09-16 ENCOUNTER — RX RENEWAL (OUTPATIENT)
Age: 79
End: 2024-09-16

## 2024-09-23 ENCOUNTER — NON-APPOINTMENT (OUTPATIENT)
Age: 79
End: 2024-09-23

## 2024-09-23 ENCOUNTER — APPOINTMENT (OUTPATIENT)
Dept: RHEUMATOLOGY | Facility: CLINIC | Age: 79
End: 2024-09-23

## 2024-10-03 ENCOUNTER — LABORATORY RESULT (OUTPATIENT)
Age: 79
End: 2024-10-03

## 2024-10-10 ENCOUNTER — LABORATORY RESULT (OUTPATIENT)
Age: 79
End: 2024-10-10

## 2024-10-10 ENCOUNTER — APPOINTMENT (OUTPATIENT)
Dept: RHEUMATOLOGY | Facility: CLINIC | Age: 79
End: 2024-10-10
Payer: MEDICARE

## 2024-10-10 VITALS
WEIGHT: 207 LBS | HEIGHT: 62 IN | OXYGEN SATURATION: 98 % | RESPIRATION RATE: 16 BRPM | DIASTOLIC BLOOD PRESSURE: 73 MMHG | SYSTOLIC BLOOD PRESSURE: 134 MMHG | TEMPERATURE: 98.1 F | HEART RATE: 64 BPM | BODY MASS INDEX: 38.09 KG/M2

## 2024-10-10 DIAGNOSIS — E78.5 HYPERLIPIDEMIA, UNSPECIFIED: ICD-10-CM

## 2024-10-10 DIAGNOSIS — R73.03 PREDIABETES.: ICD-10-CM

## 2024-10-10 DIAGNOSIS — M25.511 PAIN IN RIGHT SHOULDER: ICD-10-CM

## 2024-10-10 DIAGNOSIS — E66.01 MORBID (SEVERE) OBESITY DUE TO EXCESS CALORIES: ICD-10-CM

## 2024-10-10 DIAGNOSIS — M79.7 FIBROMYALGIA: ICD-10-CM

## 2024-10-10 PROCEDURE — 99214 OFFICE O/P EST MOD 30 MIN: CPT

## 2024-10-10 PROCEDURE — G2211 COMPLEX E/M VISIT ADD ON: CPT

## 2024-10-14 ENCOUNTER — RX RENEWAL (OUTPATIENT)
Age: 79
End: 2024-10-14

## 2024-10-15 LAB
ALBUMIN SERPL ELPH-MCNC: 4.1 G/DL
ALP BLD-CCNC: 92 U/L
ALT SERPL-CCNC: 9 U/L
ANION GAP SERPL CALC-SCNC: 12 MMOL/L
AST SERPL-CCNC: 19 U/L
BASOPHILS # BLD AUTO: 0.07 K/UL
BASOPHILS NFR BLD AUTO: 1.6 %
BILIRUB SERPL-MCNC: 0.4 MG/DL
BUN SERPL-MCNC: 13 MG/DL
CALCIUM SERPL-MCNC: 9.2 MG/DL
CHLORIDE SERPL-SCNC: 101 MMOL/L
CHOLEST SERPL-MCNC: 158 MG/DL
CO2 SERPL-SCNC: 26 MMOL/L
CREAT SERPL-MCNC: 0.65 MG/DL
CRP SERPL-MCNC: 8 MG/L
EGFR: 91 ML/MIN/1.73M2
EOSINOPHIL # BLD AUTO: 0.15 K/UL
EOSINOPHIL NFR BLD AUTO: 3.5 %
ERYTHROCYTE [SEDIMENTATION RATE] IN BLOOD BY WESTERGREN METHOD: 53 MM/HR
ESTIMATED AVERAGE GLUCOSE: 114 MG/DL
GLUCOSE SERPL-MCNC: 80 MG/DL
HBA1C MFR BLD HPLC: 5.6 %
HCT VFR BLD CALC: 35.2 %
HDLC SERPL-MCNC: 82 MG/DL
HGB BLD-MCNC: 11.3 G/DL
IMM GRANULOCYTES NFR BLD AUTO: 0.2 %
LDLC SERPL CALC-MCNC: 65 MG/DL
LYMPHOCYTES # BLD AUTO: 1.69 K/UL
LYMPHOCYTES NFR BLD AUTO: 38.9 %
MAN DIFF?: NORMAL
MCHC RBC-ENTMCNC: 22.4 PG
MCHC RBC-ENTMCNC: 32.1 GM/DL
MCV RBC AUTO: 69.8 FL
MONOCYTES # BLD AUTO: 0.7 K/UL
MONOCYTES NFR BLD AUTO: 16.1 %
NEUTROPHILS # BLD AUTO: 1.72 K/UL
NEUTROPHILS NFR BLD AUTO: 39.7 %
NONHDLC SERPL-MCNC: 76 MG/DL
PLATELET # BLD AUTO: 271 K/UL
POTASSIUM SERPL-SCNC: 4.7 MMOL/L
PROT SERPL-MCNC: 6.5 G/DL
RBC # BLD: 5.04 M/UL
RBC # FLD: 17.5 %
SODIUM SERPL-SCNC: 139 MMOL/L
TRIGL SERPL-MCNC: 49 MG/DL
TSH SERPL-ACNC: 1.21 UIU/ML
WBC # FLD AUTO: 4.34 K/UL

## 2024-10-22 ENCOUNTER — LABORATORY RESULT (OUTPATIENT)
Age: 79
End: 2024-10-22

## 2024-11-05 ENCOUNTER — LABORATORY RESULT (OUTPATIENT)
Age: 79
End: 2024-11-05

## 2024-11-12 ENCOUNTER — APPOINTMENT (OUTPATIENT)
Dept: INTERNAL MEDICINE | Facility: CLINIC | Age: 79
End: 2024-11-12

## 2024-12-05 ENCOUNTER — LABORATORY RESULT (OUTPATIENT)
Age: 79
End: 2024-12-05

## 2024-12-07 ENCOUNTER — TRANSCRIPTION ENCOUNTER (OUTPATIENT)
Age: 79
End: 2024-12-07

## 2024-12-10 ENCOUNTER — LABORATORY RESULT (OUTPATIENT)
Age: 79
End: 2024-12-10

## 2024-12-12 ENCOUNTER — RX RENEWAL (OUTPATIENT)
Age: 79
End: 2024-12-12

## 2024-12-19 ENCOUNTER — EMERGENCY (EMERGENCY)
Facility: HOSPITAL | Age: 79
LOS: 1 days | Discharge: HOME | End: 2024-12-19
Attending: STUDENT IN AN ORGANIZED HEALTH CARE EDUCATION/TRAINING PROGRAM | Admitting: STUDENT IN AN ORGANIZED HEALTH CARE EDUCATION/TRAINING PROGRAM
Payer: MEDICARE

## 2024-12-19 VITALS
OXYGEN SATURATION: 98 % | WEIGHT: 190.04 LBS | SYSTOLIC BLOOD PRESSURE: 171 MMHG | DIASTOLIC BLOOD PRESSURE: 96 MMHG | RESPIRATION RATE: 18 BRPM | HEART RATE: 66 BPM | TEMPERATURE: 98 F | HEIGHT: 61 IN

## 2024-12-19 DIAGNOSIS — Z98.890 OTHER SPECIFIED POSTPROCEDURAL STATES: Chronic | ICD-10-CM

## 2024-12-19 DIAGNOSIS — Z96.643 PRESENCE OF ARTIFICIAL HIP JOINT, BILATERAL: Chronic | ICD-10-CM

## 2024-12-19 DIAGNOSIS — Z98.1 ARTHRODESIS STATUS: Chronic | ICD-10-CM

## 2024-12-19 DIAGNOSIS — Z98.89 OTHER SPECIFIED POSTPROCEDURAL STATES: Chronic | ICD-10-CM

## 2024-12-19 DIAGNOSIS — I83.93 ASYMPTOMATIC VARICOSE VEINS OF BILATERAL LOWER EXTREMITIES: Chronic | ICD-10-CM

## 2024-12-19 DIAGNOSIS — Z96.651 PRESENCE OF RIGHT ARTIFICIAL KNEE JOINT: Chronic | ICD-10-CM

## 2024-12-19 DIAGNOSIS — Z90.49 ACQUIRED ABSENCE OF OTHER SPECIFIED PARTS OF DIGESTIVE TRACT: Chronic | ICD-10-CM

## 2024-12-19 LAB
ALBUMIN SERPL ELPH-MCNC: 4.1 G/DL — SIGNIFICANT CHANGE UP (ref 3.3–5)
ALP SERPL-CCNC: 109 U/L — SIGNIFICANT CHANGE UP (ref 40–120)
ALT FLD-CCNC: 12 U/L — SIGNIFICANT CHANGE UP (ref 4–33)
ANION GAP SERPL CALC-SCNC: 12 MMOL/L — SIGNIFICANT CHANGE UP (ref 7–14)
APTT BLD: 43.8 SEC — HIGH (ref 24.5–35.6)
AST SERPL-CCNC: 18 U/L — SIGNIFICANT CHANGE UP (ref 4–32)
BASOPHILS # BLD AUTO: 0.05 K/UL — SIGNIFICANT CHANGE UP (ref 0–0.2)
BASOPHILS NFR BLD AUTO: 1 % — SIGNIFICANT CHANGE UP (ref 0–2)
BILIRUB SERPL-MCNC: 0.3 MG/DL — SIGNIFICANT CHANGE UP (ref 0.2–1.2)
BUN SERPL-MCNC: 15 MG/DL — SIGNIFICANT CHANGE UP (ref 7–23)
CALCIUM SERPL-MCNC: 9 MG/DL — SIGNIFICANT CHANGE UP (ref 8.4–10.5)
CHLORIDE SERPL-SCNC: 101 MMOL/L — SIGNIFICANT CHANGE UP (ref 98–107)
CO2 SERPL-SCNC: 26 MMOL/L — SIGNIFICANT CHANGE UP (ref 22–31)
CREAT SERPL-MCNC: 0.62 MG/DL — SIGNIFICANT CHANGE UP (ref 0.5–1.3)
EGFR: 91 ML/MIN/1.73M2 — SIGNIFICANT CHANGE UP
EGFR: 91 ML/MIN/1.73M2 — SIGNIFICANT CHANGE UP
EOSINOPHIL # BLD AUTO: 0.06 K/UL — SIGNIFICANT CHANGE UP (ref 0–0.5)
EOSINOPHIL NFR BLD AUTO: 1.2 % — SIGNIFICANT CHANGE UP (ref 0–6)
GLUCOSE SERPL-MCNC: 78 MG/DL — SIGNIFICANT CHANGE UP (ref 70–99)
HCT VFR BLD CALC: 34.3 % — LOW (ref 34.5–45)
HGB BLD-MCNC: 11.4 G/DL — LOW (ref 11.5–15.5)
IANC: 2.42 K/UL — SIGNIFICANT CHANGE UP (ref 1.8–7.4)
IMM GRANULOCYTES NFR BLD AUTO: 0.2 % — SIGNIFICANT CHANGE UP (ref 0–0.9)
INR BLD: 2.21 RATIO — HIGH (ref 0.85–1.16)
LYMPHOCYTES # BLD AUTO: 1.81 K/UL — SIGNIFICANT CHANGE UP (ref 1–3.3)
LYMPHOCYTES # BLD AUTO: 36.5 % — SIGNIFICANT CHANGE UP (ref 13–44)
MCHC RBC-ENTMCNC: 22.9 PG — LOW (ref 27–34)
MCHC RBC-ENTMCNC: 33.2 G/DL — SIGNIFICANT CHANGE UP (ref 32–36)
MCV RBC AUTO: 69 FL — LOW (ref 80–100)
MONOCYTES # BLD AUTO: 0.61 K/UL — SIGNIFICANT CHANGE UP (ref 0–0.9)
MONOCYTES NFR BLD AUTO: 12.3 % — SIGNIFICANT CHANGE UP (ref 2–14)
NEUTROPHILS # BLD AUTO: 2.42 K/UL — SIGNIFICANT CHANGE UP (ref 1.8–7.4)
NEUTROPHILS NFR BLD AUTO: 48.8 % — SIGNIFICANT CHANGE UP (ref 43–77)
NRBC # BLD AUTO: 0 K/UL — SIGNIFICANT CHANGE UP (ref 0–0)
NRBC # BLD: 0 /100 WBCS — SIGNIFICANT CHANGE UP (ref 0–0)
NRBC # FLD: 0 K/UL — SIGNIFICANT CHANGE UP (ref 0–0)
NRBC BLD-RTO: 0 /100 WBCS — SIGNIFICANT CHANGE UP (ref 0–0)
PLATELET # BLD AUTO: 292 K/UL — SIGNIFICANT CHANGE UP (ref 150–400)
POTASSIUM SERPL-MCNC: 4.1 MMOL/L — SIGNIFICANT CHANGE UP (ref 3.5–5.3)
POTASSIUM SERPL-SCNC: 4.1 MMOL/L — SIGNIFICANT CHANGE UP (ref 3.5–5.3)
PROT SERPL-MCNC: 7 G/DL — SIGNIFICANT CHANGE UP (ref 6–8.3)
PROTHROM AB SERPL-ACNC: 25.4 SEC — HIGH (ref 9.9–13.4)
RBC # BLD: 4.97 M/UL — SIGNIFICANT CHANGE UP (ref 3.8–5.2)
RBC # FLD: 16.5 % — HIGH (ref 10.3–14.5)
SODIUM SERPL-SCNC: 139 MMOL/L — SIGNIFICANT CHANGE UP (ref 135–145)
WBC # BLD: 4.96 K/UL — SIGNIFICANT CHANGE UP (ref 3.8–10.5)
WBC # FLD AUTO: 4.96 K/UL — SIGNIFICANT CHANGE UP (ref 3.8–10.5)

## 2024-12-19 PROCEDURE — 99284 EMERGENCY DEPT VISIT MOD MDM: CPT

## 2024-12-23 ENCOUNTER — APPOINTMENT (OUTPATIENT)
Dept: OPHTHALMOLOGY | Facility: CLINIC | Age: 79
End: 2024-12-23

## 2024-12-23 ENCOUNTER — NON-APPOINTMENT (OUTPATIENT)
Age: 79
End: 2024-12-23

## 2024-12-23 PROCEDURE — 92012 INTRM OPH EXAM EST PATIENT: CPT

## 2024-12-28 ENCOUNTER — RX RENEWAL (OUTPATIENT)
Age: 79
End: 2024-12-28

## 2024-12-28 RX ORDER — FLUTICASONE PROPIONATE AND SALMETEROL 250; 50 UG/1; UG/1
250-50 POWDER RESPIRATORY (INHALATION)
Qty: 60 | Refills: 5 | Status: ACTIVE | COMMUNITY
Start: 2024-12-28 | End: 1900-01-01

## 2025-01-06 ENCOUNTER — LABORATORY RESULT (OUTPATIENT)
Age: 80
End: 2025-01-06

## 2025-01-08 ENCOUNTER — APPOINTMENT (OUTPATIENT)
Dept: PULMONOLOGY | Facility: CLINIC | Age: 80
End: 2025-01-08
Payer: MEDICARE

## 2025-01-08 ENCOUNTER — APPOINTMENT (OUTPATIENT)
Dept: PULMONOLOGY | Facility: CLINIC | Age: 80
End: 2025-01-08

## 2025-01-08 VITALS
DIASTOLIC BLOOD PRESSURE: 84 MMHG | SYSTOLIC BLOOD PRESSURE: 149 MMHG | HEIGHT: 62 IN | BODY MASS INDEX: 35.15 KG/M2 | OXYGEN SATURATION: 99 % | WEIGHT: 191 LBS | HEART RATE: 55 BPM

## 2025-01-08 PROCEDURE — 94726 PLETHYSMOGRAPHY LUNG VOLUMES: CPT

## 2025-01-08 PROCEDURE — 94729 DIFFUSING CAPACITY: CPT

## 2025-01-08 PROCEDURE — 94060 EVALUATION OF WHEEZING: CPT

## 2025-01-08 PROCEDURE — ZZZZZ: CPT

## 2025-01-08 PROCEDURE — 99214 OFFICE O/P EST MOD 30 MIN: CPT | Mod: 25

## 2025-01-09 ENCOUNTER — RX RENEWAL (OUTPATIENT)
Age: 80
End: 2025-01-09

## 2025-01-31 ENCOUNTER — APPOINTMENT (OUTPATIENT)
Dept: CT IMAGING | Facility: IMAGING CENTER | Age: 80
End: 2025-01-31
Payer: MEDICARE

## 2025-01-31 ENCOUNTER — OUTPATIENT (OUTPATIENT)
Dept: OUTPATIENT SERVICES | Facility: HOSPITAL | Age: 80
LOS: 1 days | End: 2025-01-31
Payer: MEDICARE

## 2025-01-31 DIAGNOSIS — Z98.89 OTHER SPECIFIED POSTPROCEDURAL STATES: Chronic | ICD-10-CM

## 2025-01-31 DIAGNOSIS — I83.93 ASYMPTOMATIC VARICOSE VEINS OF BILATERAL LOWER EXTREMITIES: Chronic | ICD-10-CM

## 2025-01-31 DIAGNOSIS — Z96.643 PRESENCE OF ARTIFICIAL HIP JOINT, BILATERAL: Chronic | ICD-10-CM

## 2025-01-31 DIAGNOSIS — Z90.49 ACQUIRED ABSENCE OF OTHER SPECIFIED PARTS OF DIGESTIVE TRACT: Chronic | ICD-10-CM

## 2025-01-31 DIAGNOSIS — Z96.651 PRESENCE OF RIGHT ARTIFICIAL KNEE JOINT: Chronic | ICD-10-CM

## 2025-01-31 DIAGNOSIS — Z00.8 ENCOUNTER FOR OTHER GENERAL EXAMINATION: ICD-10-CM

## 2025-01-31 PROCEDURE — 71250 CT THORAX DX C-: CPT | Mod: 26

## 2025-01-31 PROCEDURE — 71250 CT THORAX DX C-: CPT

## 2025-02-04 ENCOUNTER — NON-APPOINTMENT (OUTPATIENT)
Age: 80
End: 2025-02-04

## 2025-02-04 ENCOUNTER — APPOINTMENT (OUTPATIENT)
Age: 80
End: 2025-02-04
Payer: MEDICARE

## 2025-02-04 ENCOUNTER — APPOINTMENT (OUTPATIENT)
Dept: PULMONOLOGY | Facility: CLINIC | Age: 80
End: 2025-02-04

## 2025-02-06 ENCOUNTER — RX RENEWAL (OUTPATIENT)
Age: 80
End: 2025-02-06

## 2025-02-06 ENCOUNTER — NON-APPOINTMENT (OUTPATIENT)
Age: 80
End: 2025-02-06

## 2025-02-06 ENCOUNTER — RESULT CHARGE (OUTPATIENT)
Age: 80
End: 2025-02-06

## 2025-02-06 ENCOUNTER — APPOINTMENT (OUTPATIENT)
Dept: INTERNAL MEDICINE | Facility: CLINIC | Age: 80
End: 2025-02-06
Payer: MEDICARE

## 2025-02-06 VITALS
SYSTOLIC BLOOD PRESSURE: 172 MMHG | HEART RATE: 62 BPM | OXYGEN SATURATION: 99 % | BODY MASS INDEX: 34.6 KG/M2 | HEIGHT: 62 IN | RESPIRATION RATE: 15 BRPM | WEIGHT: 188 LBS | DIASTOLIC BLOOD PRESSURE: 83 MMHG | TEMPERATURE: 97.4 F

## 2025-02-06 VITALS — SYSTOLIC BLOOD PRESSURE: 130 MMHG | DIASTOLIC BLOOD PRESSURE: 70 MMHG

## 2025-02-06 DIAGNOSIS — Z23 ENCOUNTER FOR IMMUNIZATION: ICD-10-CM

## 2025-02-06 DIAGNOSIS — I10 ESSENTIAL (PRIMARY) HYPERTENSION: ICD-10-CM

## 2025-02-06 DIAGNOSIS — Z12.39 ENCOUNTER FOR OTHER SCREENING FOR MALIGNANT NEOPLASM OF BREAST: ICD-10-CM

## 2025-02-06 DIAGNOSIS — Z00.00 ENCOUNTER FOR GENERAL ADULT MEDICAL EXAMINATION W/OUT ABNORMAL FINDINGS: ICD-10-CM

## 2025-02-06 DIAGNOSIS — R91.1 SOLITARY PULMONARY NODULE: ICD-10-CM

## 2025-02-06 DIAGNOSIS — E78.5 HYPERLIPIDEMIA, UNSPECIFIED: ICD-10-CM

## 2025-02-06 DIAGNOSIS — D68.59 OTHER PRIMARY THROMBOPHILIA: ICD-10-CM

## 2025-02-06 PROCEDURE — 93000 ELECTROCARDIOGRAM COMPLETE: CPT

## 2025-02-06 PROCEDURE — 90715 TDAP VACCINE 7 YRS/> IM: CPT

## 2025-02-06 PROCEDURE — 36415 COLL VENOUS BLD VENIPUNCTURE: CPT

## 2025-02-06 PROCEDURE — 90471 IMMUNIZATION ADMIN: CPT

## 2025-02-06 PROCEDURE — 99214 OFFICE O/P EST MOD 30 MIN: CPT | Mod: 25

## 2025-02-06 PROCEDURE — G0439: CPT

## 2025-02-07 DIAGNOSIS — E83.51 HYPOCALCEMIA: ICD-10-CM

## 2025-02-07 NOTE — HISTORY OF PRESENT ILLNESS
[FreeTextEntry1] : Followup for anticoagulation management. No new specific complaints. Concerned about edema which I told her was related to her blood pressure medications. yes Left arm;

## 2025-02-12 LAB
25(OH)D3 SERPL-MCNC: 44.6 NG/ML
ALBUMIN SERPL ELPH-MCNC: 4.4 G/DL
ALP BLD-CCNC: 80 U/L
ALT SERPL-CCNC: 11 U/L
ANION GAP SERPL CALC-SCNC: 11 MMOL/L
AST SERPL-CCNC: 20 U/L
BILIRUB SERPL-MCNC: 0.3 MG/DL
BUN SERPL-MCNC: 9 MG/DL
CALCIUM SERPL-MCNC: 8.2 MG/DL
CALCIUM SERPL-MCNC: 9.4 MG/DL
CHLORIDE SERPL-SCNC: 98 MMOL/L
CHOLEST SERPL-MCNC: 166 MG/DL
CO2 SERPL-SCNC: 26 MMOL/L
CREAT SERPL-MCNC: 0.59 MG/DL
EGFR: 92 ML/MIN/1.73M2
ESTIMATED AVERAGE GLUCOSE: 108 MG/DL
GLUCOSE SERPL-MCNC: 93 MG/DL
HBA1C MFR BLD HPLC: 5.4 %
HCT VFR BLD CALC: 36.1 %
HDLC SERPL-MCNC: 90 MG/DL
HGB BLD-MCNC: 11.5 G/DL
INR PPP: 2.39 RATIO
LDLC SERPL CALC-MCNC: 64 MG/DL
MCHC RBC-ENTMCNC: 22.5 PG
MCHC RBC-ENTMCNC: 31.9 G/DL
MCV RBC AUTO: 70.8 FL
NONHDLC SERPL-MCNC: 76 MG/DL
PARATHYROID HORMONE INTACT: 53 PG/ML
PLATELET # BLD AUTO: 305 K/UL
POTASSIUM SERPL-SCNC: 4.2 MMOL/L
PROT SERPL-MCNC: 6.7 G/DL
PT BLD: 28 SEC
RBC # BLD: 5.1 M/UL
RBC # FLD: 17.6 %
SODIUM SERPL-SCNC: 135 MMOL/L
TRIGL SERPL-MCNC: 55 MG/DL
TSH SERPL-ACNC: 1.3 UIU/ML
WBC # FLD AUTO: 4.38 K/UL

## 2025-02-18 ENCOUNTER — APPOINTMENT (OUTPATIENT)
Dept: GASTROENTEROLOGY | Facility: CLINIC | Age: 80
End: 2025-02-18
Payer: MEDICARE

## 2025-02-18 VITALS
DIASTOLIC BLOOD PRESSURE: 84 MMHG | HEIGHT: 62 IN | BODY MASS INDEX: 34.41 KG/M2 | SYSTOLIC BLOOD PRESSURE: 149 MMHG | HEART RATE: 61 BPM | WEIGHT: 187 LBS

## 2025-02-18 DIAGNOSIS — K59.09 OTHER CONSTIPATION: ICD-10-CM

## 2025-02-18 DIAGNOSIS — Z12.11 ENCOUNTER FOR SCREENING FOR MALIGNANT NEOPLASM OF COLON: ICD-10-CM

## 2025-02-18 DIAGNOSIS — R19.8 OTHER SPECIFIED SYMPTOMS AND SIGNS INVOLVING THE DIGESTIVE SYSTEM AND ABDOMEN: ICD-10-CM

## 2025-02-18 DIAGNOSIS — Z86.0100 PERSONAL HISTORY OF COLON POLYPS, UNSPECIFIED: ICD-10-CM

## 2025-02-18 PROCEDURE — 99204 OFFICE O/P NEW MOD 45 MIN: CPT | Mod: 25

## 2025-02-18 PROCEDURE — 82272 OCCULT BLD FECES 1-3 TESTS: CPT

## 2025-02-28 ENCOUNTER — NON-APPOINTMENT (OUTPATIENT)
Age: 80
End: 2025-02-28

## 2025-02-28 ENCOUNTER — APPOINTMENT (OUTPATIENT)
Dept: OPHTHALMOLOGY | Facility: CLINIC | Age: 80
End: 2025-02-28
Payer: MEDICARE

## 2025-02-28 PROCEDURE — 68761 CLOSE TEAR DUCT OPENING: CPT | Mod: E2,E4,E1,E3

## 2025-02-28 PROCEDURE — 92014 COMPRE OPH EXAM EST PT 1/>: CPT | Mod: 25

## 2025-03-03 ENCOUNTER — LABORATORY RESULT (OUTPATIENT)
Age: 80
End: 2025-03-03

## 2025-03-04 ENCOUNTER — LABORATORY RESULT (OUTPATIENT)
Age: 80
End: 2025-03-04

## 2025-03-04 ENCOUNTER — APPOINTMENT (OUTPATIENT)
Dept: INTERNAL MEDICINE | Facility: CLINIC | Age: 80
End: 2025-03-04

## 2025-03-05 ENCOUNTER — LABORATORY RESULT (OUTPATIENT)
Age: 80
End: 2025-03-05

## 2025-03-10 ENCOUNTER — RX RENEWAL (OUTPATIENT)
Age: 80
End: 2025-03-10

## 2025-03-11 ENCOUNTER — APPOINTMENT (OUTPATIENT)
Dept: INTERNAL MEDICINE | Facility: CLINIC | Age: 80
End: 2025-03-11
Payer: MEDICARE

## 2025-03-11 DIAGNOSIS — L30.9 DERMATITIS, UNSPECIFIED: ICD-10-CM

## 2025-03-11 DIAGNOSIS — D68.59 OTHER PRIMARY THROMBOPHILIA: ICD-10-CM

## 2025-03-11 PROCEDURE — G2211 COMPLEX E/M VISIT ADD ON: CPT | Mod: 2W

## 2025-03-11 PROCEDURE — 99214 OFFICE O/P EST MOD 30 MIN: CPT | Mod: 2W

## 2025-03-24 ENCOUNTER — LABORATORY RESULT (OUTPATIENT)
Age: 80
End: 2025-03-24

## 2025-03-25 ENCOUNTER — APPOINTMENT (OUTPATIENT)
Dept: INTERNAL MEDICINE | Facility: CLINIC | Age: 80
End: 2025-03-25
Payer: MEDICARE

## 2025-03-25 DIAGNOSIS — D68.59 OTHER PRIMARY THROMBOPHILIA: ICD-10-CM

## 2025-03-25 PROCEDURE — 99213 OFFICE O/P EST LOW 20 MIN: CPT | Mod: 2W

## 2025-03-25 PROCEDURE — G2211 COMPLEX E/M VISIT ADD ON: CPT | Mod: 2W

## 2025-04-21 ENCOUNTER — LABORATORY RESULT (OUTPATIENT)
Age: 80
End: 2025-04-21

## 2025-04-22 ENCOUNTER — APPOINTMENT (OUTPATIENT)
Dept: INTERNAL MEDICINE | Facility: CLINIC | Age: 80
End: 2025-04-22
Payer: MEDICARE

## 2025-04-22 DIAGNOSIS — D68.59 OTHER PRIMARY THROMBOPHILIA: ICD-10-CM

## 2025-04-22 PROCEDURE — 99214 OFFICE O/P EST MOD 30 MIN: CPT | Mod: 2W

## 2025-04-22 PROCEDURE — G2211 COMPLEX E/M VISIT ADD ON: CPT | Mod: 2W

## 2025-05-05 ENCOUNTER — LABORATORY RESULT (OUTPATIENT)
Age: 80
End: 2025-05-05

## 2025-05-05 ENCOUNTER — RX RENEWAL (OUTPATIENT)
Age: 80
End: 2025-05-05

## 2025-05-06 ENCOUNTER — LABORATORY RESULT (OUTPATIENT)
Age: 80
End: 2025-05-06

## 2025-05-06 ENCOUNTER — APPOINTMENT (OUTPATIENT)
Dept: INTERNAL MEDICINE | Facility: CLINIC | Age: 80
End: 2025-05-06
Payer: MEDICARE

## 2025-05-06 DIAGNOSIS — D68.59 OTHER PRIMARY THROMBOPHILIA: ICD-10-CM

## 2025-05-06 PROCEDURE — G2211 COMPLEX E/M VISIT ADD ON: CPT | Mod: 2W

## 2025-05-06 PROCEDURE — 99214 OFFICE O/P EST MOD 30 MIN: CPT | Mod: 2W

## 2025-05-08 ENCOUNTER — LABORATORY RESULT (OUTPATIENT)
Age: 80
End: 2025-05-08

## 2025-05-19 ENCOUNTER — OUTPATIENT (OUTPATIENT)
Dept: OUTPATIENT SERVICES | Facility: HOSPITAL | Age: 80
LOS: 1 days | End: 2025-05-19
Payer: MEDICARE

## 2025-05-19 ENCOUNTER — APPOINTMENT (OUTPATIENT)
Dept: RADIOLOGY | Facility: IMAGING CENTER | Age: 80
End: 2025-05-19
Payer: MEDICARE

## 2025-05-19 ENCOUNTER — APPOINTMENT (OUTPATIENT)
Dept: RHEUMATOLOGY | Facility: CLINIC | Age: 80
End: 2025-05-19
Payer: MEDICARE

## 2025-05-19 VITALS
SYSTOLIC BLOOD PRESSURE: 161 MMHG | RESPIRATION RATE: 16 BRPM | HEART RATE: 61 BPM | WEIGHT: 179 LBS | OXYGEN SATURATION: 98 % | HEIGHT: 62 IN | DIASTOLIC BLOOD PRESSURE: 82 MMHG | BODY MASS INDEX: 32.94 KG/M2

## 2025-05-19 DIAGNOSIS — Z00.00 ENCOUNTER FOR GENERAL ADULT MEDICAL EXAMINATION W/OUT ABNORMAL FINDINGS: ICD-10-CM

## 2025-05-19 DIAGNOSIS — K59.00 CONSTIPATION, UNSPECIFIED: ICD-10-CM

## 2025-05-19 DIAGNOSIS — Z98.89 OTHER SPECIFIED POSTPROCEDURAL STATES: Chronic | ICD-10-CM

## 2025-05-19 DIAGNOSIS — Z98.1 ARTHRODESIS STATUS: Chronic | ICD-10-CM

## 2025-05-19 DIAGNOSIS — Z90.49 ACQUIRED ABSENCE OF OTHER SPECIFIED PARTS OF DIGESTIVE TRACT: Chronic | ICD-10-CM

## 2025-05-19 DIAGNOSIS — Z96.643 PRESENCE OF ARTIFICIAL HIP JOINT, BILATERAL: Chronic | ICD-10-CM

## 2025-05-19 DIAGNOSIS — Z98.890 OTHER SPECIFIED POSTPROCEDURAL STATES: Chronic | ICD-10-CM

## 2025-05-19 DIAGNOSIS — Z96.651 PRESENCE OF RIGHT ARTIFICIAL KNEE JOINT: Chronic | ICD-10-CM

## 2025-05-19 DIAGNOSIS — I83.93 ASYMPTOMATIC VARICOSE VEINS OF BILATERAL LOWER EXTREMITIES: Chronic | ICD-10-CM

## 2025-05-19 PROCEDURE — 74019 RADEX ABDOMEN 2 VIEWS: CPT

## 2025-05-19 PROCEDURE — 74019 RADEX ABDOMEN 2 VIEWS: CPT | Mod: 26

## 2025-05-19 PROCEDURE — 99213 OFFICE O/P EST LOW 20 MIN: CPT

## 2025-05-19 PROCEDURE — G2211 COMPLEX E/M VISIT ADD ON: CPT

## 2025-05-22 ENCOUNTER — APPOINTMENT (OUTPATIENT)
Dept: GASTROENTEROLOGY | Facility: CLINIC | Age: 80
End: 2025-05-22
Payer: MEDICARE

## 2025-05-22 VITALS
HEART RATE: 66 BPM | HEIGHT: 62 IN | BODY MASS INDEX: 32.57 KG/M2 | WEIGHT: 177 LBS | DIASTOLIC BLOOD PRESSURE: 109 MMHG | SYSTOLIC BLOOD PRESSURE: 183 MMHG

## 2025-05-22 DIAGNOSIS — K59.09 OTHER CONSTIPATION: ICD-10-CM

## 2025-05-22 DIAGNOSIS — K59.00 CONSTIPATION, UNSPECIFIED: ICD-10-CM

## 2025-05-22 PROCEDURE — 99214 OFFICE O/P EST MOD 30 MIN: CPT

## 2025-05-22 PROCEDURE — G2211 COMPLEX E/M VISIT ADD ON: CPT

## 2025-05-22 RX ORDER — POLYETHYLENE GLYCOL 3350 AND ELECTROLYTES WITH LEMON FLAVOR 236; 22.74; 6.74; 5.86; 2.97 G/4L; G/4L; G/4L; G/4L; G/4L
236 POWDER, FOR SOLUTION ORAL
Qty: 1 | Refills: 0 | Status: ACTIVE | COMMUNITY
Start: 2025-05-22 | End: 1900-01-01

## 2025-05-23 PROBLEM — K59.00 CONSTIPATION: Noted: 2025-05-19

## 2025-05-27 ENCOUNTER — APPOINTMENT (OUTPATIENT)
Dept: OPHTHALMOLOGY | Facility: CLINIC | Age: 80
End: 2025-05-27

## 2025-06-09 ENCOUNTER — LABORATORY RESULT (OUTPATIENT)
Age: 80
End: 2025-06-09

## 2025-06-10 ENCOUNTER — APPOINTMENT (OUTPATIENT)
Dept: INTERNAL MEDICINE | Facility: CLINIC | Age: 80
End: 2025-06-10
Payer: MEDICARE

## 2025-06-10 PROCEDURE — G2211 COMPLEX E/M VISIT ADD ON: CPT | Mod: 2W

## 2025-06-10 PROCEDURE — 99214 OFFICE O/P EST MOD 30 MIN: CPT | Mod: 2W

## 2025-06-12 NOTE — PHYSICAL EXAM
"Laxmi is a 50 year old who is being evaluated via a billable video visit.              Subjective   Laxmi is a 50 year old, presenting for the following health issues:  RECHECK (Follow up Dupixent use, going well)    Video Start Time: 1123    HPI      Chief complaint: Follow-up chronic sinusitis with nasal polyps    History of present illness: This is a pleasant 50-year-old woman with a history of chronic sinusitis with nasal polyps as well as asthma here today for her follow-up visit.  Currently using Dupixent 300 mg every 2 weeks.  Denies any eye irritation or skin rash with the medication.  Reports she still able to breathe well through her nose.  Continues on Nasonex as well.  For her asthma, using Trelegy.  She reports no breakthrough symptoms with Trelegy.  No exacerbations.  No need for her rescue inhaler.          Objective    Vitals - Patient Reported  Weight (Patient Reported): 97.5 kg (215 lb)  Height (Patient Reported): 167.6 cm (5' 6\")  BMI (Based on Pt Reported Ht/Wt): 34.7  Temperature (Patient Reported): 98.6  F (37  C)        Physical Exam   GENERAL: alert and no distress  EYES: Eyes grossly normal to inspection.  No discharge or erythema, or obvious scleral/conjunctival abnormalities.  RESP: No audible wheeze, cough, or visible cyanosis.    SKIN: Visible skin clear. No significant rash, abnormal pigmentation or lesions.  NEURO: Cranial nerves grossly intact.  Mentation and speech appropriate for age.  PSYCH: Appropriate affect, tone, and pace of words    Impression report and plan:  1.  Chronic sinusitis with nasal polyps  2.  Mild persistent asthma    Continue Dupixent 300 mg every 2 weeks.  Reviewed risk of eye irritation and eosinophilic granulomatous polyangitis and cutaneous t-cell lymphoma.  Continue Trelegy at current dosing.  Follow yearly.  Consider spirometry at that time.      Video-Visit Details    Type of service:  Video Visit   Video End Time:11:33 AM  Originating Location (pt. " Location): Home    Distant Location (provider location):  On-site  Platform used for Video Visit: Howard  Signed Electronically by: Nelia Gann MD     [FreeTextEntry3] : AAOx3, pleasant, NAD, no visual lymphadenopathy\par hair, scalp, face, nose, eyelids, ears, lips, oropharynx, neck, chest, abdomen, back, right arm, left arm, nails, and hands examined with all normal findings,\par pertinent findings include:\par \par dystrophy on left nails and b/l toenails

## 2025-07-10 ENCOUNTER — LABORATORY RESULT (OUTPATIENT)
Age: 80
End: 2025-07-10

## 2025-07-11 ENCOUNTER — NON-APPOINTMENT (OUTPATIENT)
Age: 80
End: 2025-07-11

## 2025-07-15 ENCOUNTER — APPOINTMENT (OUTPATIENT)
Dept: INTERNAL MEDICINE | Facility: CLINIC | Age: 80
End: 2025-07-15
Payer: MEDICARE

## 2025-07-15 PROCEDURE — 99214 OFFICE O/P EST MOD 30 MIN: CPT | Mod: 2W

## 2025-07-15 PROCEDURE — G2211 COMPLEX E/M VISIT ADD ON: CPT | Mod: 2W

## 2025-07-23 NOTE — ASU PATIENT PROFILE, ADULT - PATIENT'S HEIGHT AND WEIGHT RECORDED IN THE VITAL SIGNS FLOWSHEET
Assessment & Plan  1. Type 2 diabetes  - A1c was 5.1% on 01/20/2025  - Blood work including CMP, A1c, lipids, and urine for microalbumin to be conducted today  - Discussed importance of regular A1c monitoring  - Continue current diabetes management regimen    2. Hypertension  - Historically well-controlled blood pressure  - Current blood pressure: 128/70  - Discussed potential side effects of amlodipine causing ankle swelling  - Continue current medications: spironolactone, metoprolol, losartan, hydrochlorothiazide, clonidine, and amlodipine.  Will increase Lasix to 20 mg daily repeat potassium level in 2 weeks.  I do not want to start patient on a potassium today as she is also on spironolactone and ACE inhibitor-both of which are potassium sparing.    3. Chronic low back pain  - Degenerative disk disease of the lumbar spine with prior laminectomy and fusion  - Currently taking tramadol, tizanidine, and gabapentin  - Discussed effectiveness of current pain management regimen  - Continue current medications for pain management    4. Health Maintenance  - Due for colonoscopy and mammogram next month  - DEXA scan to be ordered to assess bone density  - Referral to Dr. Smyth at [place] for colonoscopy  - Blood work including CMP, A1c, lipids, and urine for microalbumin to be conducted today     Type 2 diabetes mellitus with diabetic cataract, without long-term current use of insulin (HCC)  -     Albumin/Creatinine Ratio, Urine  -     Hemoglobin A1C  Coronary artery calcification  Primary hypertension  -     Comprehensive Metabolic Panel  Panlobular emphysema (HCC)  Pure hypercholesterolemia  -     Comprehensive Metabolic Panel  -     Lipid Panel  Major depressive disorder, recurrent, in full remission  Gastroesophageal reflux disease without esophagitis  Other chronic back pain  History of DVT (deep vein thrombosis)  Post-menopause  -     DEXA BONE DENSITY AXIAL SKELETON; Future  Colon cancer screening  -     AFL -  yes

## 2025-08-10 ENCOUNTER — EMERGENCY (EMERGENCY)
Facility: HOSPITAL | Age: 80
LOS: 1 days | End: 2025-08-10
Attending: EMERGENCY MEDICINE | Admitting: EMERGENCY MEDICINE
Payer: COMMERCIAL

## 2025-08-10 VITALS
HEART RATE: 61 BPM | DIASTOLIC BLOOD PRESSURE: 64 MMHG | SYSTOLIC BLOOD PRESSURE: 143 MMHG | RESPIRATION RATE: 17 BRPM | TEMPERATURE: 98 F | OXYGEN SATURATION: 100 %

## 2025-08-10 VITALS
DIASTOLIC BLOOD PRESSURE: 75 MMHG | RESPIRATION RATE: 16 BRPM | TEMPERATURE: 98 F | HEART RATE: 69 BPM | SYSTOLIC BLOOD PRESSURE: 137 MMHG | OXYGEN SATURATION: 100 % | HEIGHT: 62 IN | WEIGHT: 179.9 LBS

## 2025-08-10 DIAGNOSIS — Z98.890 OTHER SPECIFIED POSTPROCEDURAL STATES: Chronic | ICD-10-CM

## 2025-08-10 DIAGNOSIS — Z96.643 PRESENCE OF ARTIFICIAL HIP JOINT, BILATERAL: Chronic | ICD-10-CM

## 2025-08-10 DIAGNOSIS — Z98.1 ARTHRODESIS STATUS: Chronic | ICD-10-CM

## 2025-08-10 DIAGNOSIS — Z96.651 PRESENCE OF RIGHT ARTIFICIAL KNEE JOINT: Chronic | ICD-10-CM

## 2025-08-10 DIAGNOSIS — I83.93 ASYMPTOMATIC VARICOSE VEINS OF BILATERAL LOWER EXTREMITIES: Chronic | ICD-10-CM

## 2025-08-10 DIAGNOSIS — Z98.89 OTHER SPECIFIED POSTPROCEDURAL STATES: Chronic | ICD-10-CM

## 2025-08-10 DIAGNOSIS — Z90.49 ACQUIRED ABSENCE OF OTHER SPECIFIED PARTS OF DIGESTIVE TRACT: Chronic | ICD-10-CM

## 2025-08-10 PROCEDURE — 72100 X-RAY EXAM L-S SPINE 2/3 VWS: CPT | Mod: 26

## 2025-08-10 PROCEDURE — 73030 X-RAY EXAM OF SHOULDER: CPT | Mod: 26,LT

## 2025-08-10 PROCEDURE — 72125 CT NECK SPINE W/O DYE: CPT | Mod: 26

## 2025-08-10 PROCEDURE — 71045 X-RAY EXAM CHEST 1 VIEW: CPT | Mod: 26

## 2025-08-10 PROCEDURE — 99284 EMERGENCY DEPT VISIT MOD MDM: CPT

## 2025-08-10 PROCEDURE — 93010 ELECTROCARDIOGRAM REPORT: CPT

## 2025-08-10 PROCEDURE — 70450 CT HEAD/BRAIN W/O DYE: CPT | Mod: 26

## 2025-08-10 RX ORDER — ACETAMINOPHEN 500 MG/5ML
975 LIQUID (ML) ORAL ONCE
Refills: 0 | Status: COMPLETED | OUTPATIENT
Start: 2025-08-10 | End: 2025-08-10

## 2025-08-10 RX ORDER — LIDOCAINE HYDROCHLORIDE 20 MG/ML
1 JELLY TOPICAL ONCE
Refills: 0 | Status: COMPLETED | OUTPATIENT
Start: 2025-08-10 | End: 2025-08-10

## 2025-08-10 RX ADMIN — LIDOCAINE HYDROCHLORIDE 1 PATCH: 20 JELLY TOPICAL at 17:38

## 2025-08-10 RX ADMIN — Medication 975 MILLIGRAM(S): at 17:38

## 2025-08-11 ENCOUNTER — APPOINTMENT (OUTPATIENT)
Dept: INTERNAL MEDICINE | Facility: CLINIC | Age: 80
End: 2025-08-11

## 2025-08-11 VITALS
DIASTOLIC BLOOD PRESSURE: 89 MMHG | HEART RATE: 84 BPM | TEMPERATURE: 98 F | HEIGHT: 62 IN | OXYGEN SATURATION: 95 % | BODY MASS INDEX: 33.13 KG/M2 | WEIGHT: 180 LBS | SYSTOLIC BLOOD PRESSURE: 152 MMHG | RESPIRATION RATE: 16 BRPM

## 2025-08-11 VITALS — DIASTOLIC BLOOD PRESSURE: 70 MMHG | SYSTOLIC BLOOD PRESSURE: 126 MMHG

## 2025-08-11 DIAGNOSIS — D68.59 OTHER PRIMARY THROMBOPHILIA: ICD-10-CM

## 2025-08-11 DIAGNOSIS — E78.5 HYPERLIPIDEMIA, UNSPECIFIED: ICD-10-CM

## 2025-08-11 DIAGNOSIS — E04.1 NONTOXIC SINGLE THYROID NODULE: ICD-10-CM

## 2025-08-11 DIAGNOSIS — I10 ESSENTIAL (PRIMARY) HYPERTENSION: ICD-10-CM

## 2025-08-11 LAB
ALBUMIN SERPL ELPH-MCNC: 4.1 G/DL
ALP BLD-CCNC: 80 U/L
ALT SERPL-CCNC: 23 U/L
ANION GAP SERPL CALC-SCNC: 12 MMOL/L
AST SERPL-CCNC: 28 U/L
BILIRUB SERPL-MCNC: 0.4 MG/DL
BUN SERPL-MCNC: 18 MG/DL
CALCIUM SERPL-MCNC: 9.3 MG/DL
CHLORIDE SERPL-SCNC: 104 MMOL/L
CHOLEST SERPL-MCNC: 163 MG/DL
CO2 SERPL-SCNC: 25 MMOL/L
CREAT SERPL-MCNC: 0.65 MG/DL
EGFRCR SERPLBLD CKD-EPI 2021: 90 ML/MIN/1.73M2
GLUCOSE SERPL-MCNC: 90 MG/DL
HDLC SERPL-MCNC: 97 MG/DL
INR PPP: 2.37 RATIO
LDLC SERPL-MCNC: 56 MG/DL
NONHDLC SERPL-MCNC: 67 MG/DL
POTASSIUM SERPL-SCNC: 4.6 MMOL/L
PROT SERPL-MCNC: 6.2 G/DL
PT BLD: 27.7 SEC
SODIUM SERPL-SCNC: 141 MMOL/L
TRIGL SERPL-MCNC: 52 MG/DL

## 2025-08-11 PROCEDURE — 99214 OFFICE O/P EST MOD 30 MIN: CPT

## 2025-08-11 PROCEDURE — G2211 COMPLEX E/M VISIT ADD ON: CPT

## 2025-09-04 ENCOUNTER — LABORATORY RESULT (OUTPATIENT)
Age: 80
End: 2025-09-04

## 2025-09-08 ENCOUNTER — LABORATORY RESULT (OUTPATIENT)
Age: 80
End: 2025-09-08

## 2025-09-09 ENCOUNTER — APPOINTMENT (OUTPATIENT)
Dept: INTERNAL MEDICINE | Facility: CLINIC | Age: 80
End: 2025-09-09
Payer: MEDICARE

## 2025-09-09 DIAGNOSIS — I10 ESSENTIAL (PRIMARY) HYPERTENSION: ICD-10-CM

## 2025-09-09 DIAGNOSIS — D68.59 OTHER PRIMARY THROMBOPHILIA: ICD-10-CM

## 2025-09-09 PROCEDURE — 99214 OFFICE O/P EST MOD 30 MIN: CPT | Mod: 2W

## 2025-09-09 PROCEDURE — G2211 COMPLEX E/M VISIT ADD ON: CPT | Mod: 2W

## 2025-09-11 ENCOUNTER — APPOINTMENT (OUTPATIENT)
Dept: ULTRASOUND IMAGING | Facility: IMAGING CENTER | Age: 80
End: 2025-09-11
Payer: MEDICARE

## 2025-09-11 ENCOUNTER — APPOINTMENT (OUTPATIENT)
Dept: RADIOLOGY | Facility: IMAGING CENTER | Age: 80
End: 2025-09-11
Payer: MEDICARE

## 2025-09-11 ENCOUNTER — RESULT REVIEW (OUTPATIENT)
Age: 80
End: 2025-09-11

## 2025-09-11 ENCOUNTER — APPOINTMENT (OUTPATIENT)
Dept: MAMMOGRAPHY | Facility: IMAGING CENTER | Age: 80
End: 2025-09-11
Payer: MEDICARE

## 2025-09-11 PROCEDURE — 77067 SCR MAMMO BI INCL CAD: CPT | Mod: 26

## 2025-09-11 PROCEDURE — 76536 US EXAM OF HEAD AND NECK: CPT | Mod: 26

## 2025-09-11 PROCEDURE — 77063 BREAST TOMOSYNTHESIS BI: CPT | Mod: 26

## 2025-09-11 PROCEDURE — 77080 DXA BONE DENSITY AXIAL: CPT | Mod: 26

## 2025-09-23 PROBLEM — M85.80 OSTEOPENIA: Status: ACTIVE | Noted: 2025-09-23

## (undated) DEVICE — DRSG CURITY GAUZE SPONGE 4 X 4" 12-PLY

## (undated) DEVICE — ELCTR BOVIE PENCIL SMOKE EVACUATION

## (undated) DEVICE — BRONCHOSCOPE GALAXY CONN IRR ASPIR TUBE SCP GUIDE

## (undated) DEVICE — NDL ARCHPOINT PULMONARY 21G

## (undated) DEVICE — WARMING BLANKET LOWER ADULT

## (undated) DEVICE — BIOPSY FORCEP J&J MONARCH SMOOTH CUP

## (undated) DEVICE — BIOPSY FORCEP OLYMPUS ALLIGATOR 2.0MM

## (undated) DEVICE — SYR SLIP 10CC

## (undated) DEVICE — GLV 8 PROTEXIS (WHITE)

## (undated) DEVICE — DRAPE 3/4 SHEET 52X76"

## (undated) DEVICE — VENODYNE/SCD SLEEVE CALF MEDIUM

## (undated) DEVICE — XI SEAL UNIVERSIAL 5-12MM

## (undated) DEVICE — DRAPE TOWEL BLUE 17" X 24"

## (undated) DEVICE — PACK ROBOTIC LIJ

## (undated) DEVICE — TRAP SPECIMEN SPUTUM 40CC